# Patient Record
Sex: FEMALE | Race: WHITE | Employment: UNEMPLOYED | URBAN - METROPOLITAN AREA
[De-identification: names, ages, dates, MRNs, and addresses within clinical notes are randomized per-mention and may not be internally consistent; named-entity substitution may affect disease eponyms.]

---

## 2019-06-10 ENCOUNTER — APPOINTMENT (OUTPATIENT)
Dept: CT IMAGING | Age: 59
End: 2019-06-10

## 2019-06-10 ENCOUNTER — HOSPITAL ENCOUNTER (EMERGENCY)
Age: 59
Discharge: HOME | End: 2019-06-10

## 2019-06-10 VITALS
HEART RATE: 88 BPM | RESPIRATION RATE: 18 BRPM | DIASTOLIC BLOOD PRESSURE: 92 MMHG | TEMPERATURE: 98.5 F | WEIGHT: 240 LBS | OXYGEN SATURATION: 98 % | BODY MASS INDEX: 40.97 KG/M2 | HEIGHT: 64 IN | SYSTOLIC BLOOD PRESSURE: 142 MMHG

## 2019-06-10 DIAGNOSIS — M51.36 BULGING LUMBAR DISC: ICD-10-CM

## 2019-06-10 DIAGNOSIS — M54.31 SCIATICA OF RIGHT SIDE: Primary | ICD-10-CM

## 2019-06-10 PROCEDURE — 99283 EMERGENCY DEPT VISIT LOW MDM: CPT

## 2019-06-10 PROCEDURE — 6370000000 HC RX 637 (ALT 250 FOR IP): Performed by: NURSE PRACTITIONER

## 2019-06-10 PROCEDURE — 72131 CT LUMBAR SPINE W/O DYE: CPT

## 2019-06-10 RX ORDER — ATORVASTATIN CALCIUM 10 MG/1
10 TABLET, FILM COATED ORAL EVERY EVENING
COMMUNITY
End: 2022-01-13

## 2019-06-10 RX ORDER — AMLODIPINE BESYLATE 5 MG/1
5 TABLET ORAL DAILY
COMMUNITY
End: 2022-01-13 | Stop reason: ALTCHOICE

## 2019-06-10 RX ORDER — HYDROCODONE BITARTRATE AND ACETAMINOPHEN 5; 325 MG/1; MG/1
1 TABLET ORAL ONCE
Status: COMPLETED | OUTPATIENT
Start: 2019-06-10 | End: 2019-06-10

## 2019-06-10 RX ORDER — NAPROXEN 500 MG/1
500 TABLET ORAL 2 TIMES DAILY PRN
Qty: 28 TABLET | Refills: 0 | Status: SHIPPED | OUTPATIENT
Start: 2019-06-10 | End: 2019-07-26

## 2019-06-10 RX ORDER — BENAZEPRIL HYDROCHLORIDE 10 MG/1
10 TABLET ORAL DAILY
COMMUNITY
End: 2022-01-13 | Stop reason: ALTCHOICE

## 2019-06-10 RX ORDER — PREDNISONE 10 MG/1
TABLET ORAL
Qty: 20 TABLET | Refills: 0 | Status: SHIPPED | OUTPATIENT
Start: 2019-06-10 | End: 2019-06-20

## 2019-06-10 RX ORDER — HYDROCODONE BITARTRATE AND ACETAMINOPHEN 5; 325 MG/1; MG/1
1 TABLET ORAL EVERY 6 HOURS PRN
Qty: 12 TABLET | Refills: 0 | Status: SHIPPED | OUTPATIENT
Start: 2019-06-10 | End: 2019-06-13

## 2019-06-10 RX ORDER — DICYCLOMINE HYDROCHLORIDE 10 MG/1
10 CAPSULE ORAL 2 TIMES DAILY
COMMUNITY

## 2019-06-10 RX ORDER — METHOCARBAMOL 500 MG/1
500 TABLET, FILM COATED ORAL 4 TIMES DAILY
Qty: 20 TABLET | Refills: 0 | Status: SHIPPED | OUTPATIENT
Start: 2019-06-10 | End: 2019-06-15

## 2019-06-10 RX ADMIN — HYDROCODONE BITARTRATE AND ACETAMINOPHEN 1 TABLET: 5; 325 TABLET ORAL at 10:29

## 2019-06-10 ASSESSMENT — PAIN DESCRIPTION - LOCATION: LOCATION: HIP

## 2019-06-10 ASSESSMENT — PAIN SCALES - GENERAL
PAINLEVEL_OUTOF10: 8
PAINLEVEL_OUTOF10: 9

## 2019-06-10 ASSESSMENT — PAIN DESCRIPTION - PAIN TYPE: TYPE: CHRONIC PAIN

## 2019-06-10 ASSESSMENT — PAIN DESCRIPTION - ORIENTATION: ORIENTATION: RIGHT

## 2019-06-10 NOTE — ED PROVIDER NOTES
Independent P    HPI: Dandre Alarcon 66 y. o.female with   Past Medical History:   Diagnosis Date    Hypertension     who presents with a right sided lower back pain. The patient states that the back pain began chronic in nature however worse the past few days.   The history is obtained from the patient. The mechanism of the injury is apparent. The patient states that the pain is moderate. It is worsened by movement including flexion and extension of the back as well as rotation. Patient denies any distal neurovascular complaints including numbness tingling or weakness. There are no bowel or bladder symptoms reported. The patient denies saddle or groin anesthesia. The patient also denies fevers, chills, weight loss, night sweats, IV drug use, or recent illness. States that she has had pain to the lower back area for quite some time however is now having pain to the right lower lumbar with radiation into the right lower extremity. She denies any recent fall or injury. She denies any loss of bowel or bladder control. Denies any saddle paresthesia.        ROS:   Pertinent positives and negatives are stated within HPI, all other systems reviewed and are negative.    --------------------------------------------- PAST HISTORY ---------------------------------------------  Past Medical History:  has a past medical history of Hypertension. Past Surgical History:  has no past surgical history on file. Social History:  reports that she has never smoked. She does not have any smokeless tobacco history on file. She reports that she does not drink alcohol or use drugs. Family History: family history is not on file. The patients home medications have been reviewed. Allergies: Patient has no known allergies. ------------------------- NURSING NOTES AND VITALS REVIEWED ---------------------------   The nursing notes within the ED encounter and vital signs as below have been reviewed by myself.   BP (!) 142/92   Pulse 88   Temp 98.5 °F (36.9 °C)   Resp 18   Ht 5' 4\" (1.626 m)   Wt 240 lb (108.9 kg)   SpO2 98%   BMI 41.20 kg/m²   Oxygen Saturation Interpretation: Normal    The patients available past medical records and past encounters were reviewed. Physical exam:  Constitutional:  The patient is comfortable, well appearing, non toxic in NAD. Patient is alert and oriented x3. Head: Head is atraumatic and normocephalic. Eyes: There is no discharge from the eyes. Sclerae are normal.  ENT: The oropharynx is normal. The mouth is normal to inspection. Neck: Normal range of motion of the neck is present there is no JVD present no meningeal signs are present. Respiratory/chest: The chest is nontender breath sounds are normal  Cardiovascular: Regular rate and rhythm is noted. No murmurs. No rubs or gallops. Skin: Skin is warm and dry, Skin exam normal  Neurologic exam: The patient's Riley Coma Scale is 15. No focal motor deficits. There are no focal sensory deficits. Deep tendon reflexes are intact and present bilaterally in the lower extremities. Babinski is absent. Gait is normal. Symmetric strength and sensation in the lower extremities bilaterally. Back exam: The patient has reproducible tenderness to palpation in the paravertebral lumbar region on the right sacroiliac. There is no evidence of localized erythema nor is there any focal warmth to the back. The patient has no evidence of single vertebral tenderness or any single area of interspace tenderness. There are no palpable deformities, lacerations, abrasions, or stepoffs. No midline cervical, thoracic, or lumbar spine tenderness.           -------------------------------------------------- RESULTS -------------------------------------------------  I have personally reviewed all laboratory and imaging results for this patient. Results are listed below. LABS:  No results found for this visit on 06/10/19.     RADIOLOGY:  Interpreted by Radiologist.  CT Lumbar Spine WO Contrast   Final Result   1. No acute lumbar spine fracture. 2. Chronic appearing compression deformity of the T12 vertebral body. 3. Levoconvex curvature with multilevel lumbar spondylosis, as   described above, most pronounced at L5-S1, where there is a diffuse   disc bulge and facet hypertrophy resulting in severe bilateral   foraminal narrowing. Medical decision making:   Right sciatica and multiple level of bulging disks in the lumbar spine without evidence of fracture or neurologic compromise. Patient informed of CAT scan results advised to follow-up with PCP if no improvement symptoms may need to have an outpatient nonemergent MRI for further evaluation.     Plan:  Review of past medical records for appropriate pain control and outpatient referral.           --------------------------------- IMPRESSION AND DISPOSITION ---------------------------------    IMPRESSION  1. Sciatica of right side    2.  Bulging lumbar disc        DISPOSITION  Disposition: Discharge to home  Patient condition is good         CLARA Barrios - CNP  06/10/19 0592

## 2019-07-03 ENCOUNTER — TELEPHONE (OUTPATIENT)
Dept: NEUROSURGERY | Age: 59
End: 2019-07-03

## 2019-07-15 ENCOUNTER — HOSPITAL ENCOUNTER (EMERGENCY)
Age: 59
Discharge: HOME | End: 2019-07-15

## 2019-07-15 VITALS
SYSTOLIC BLOOD PRESSURE: 130 MMHG | TEMPERATURE: 97.5 F | WEIGHT: 240 LBS | BODY MASS INDEX: 40.97 KG/M2 | RESPIRATION RATE: 16 BRPM | OXYGEN SATURATION: 95 % | DIASTOLIC BLOOD PRESSURE: 92 MMHG | HEART RATE: 92 BPM | HEIGHT: 64 IN

## 2019-07-15 DIAGNOSIS — I10 ELEVATED BLOOD PRESSURE READING WITH DIAGNOSIS OF HYPERTENSION: ICD-10-CM

## 2019-07-15 DIAGNOSIS — M54.41 ACUTE BILATERAL LOW BACK PAIN WITH RIGHT-SIDED SCIATICA: Primary | ICD-10-CM

## 2019-07-15 PROCEDURE — 6370000000 HC RX 637 (ALT 250 FOR IP): Performed by: NURSE PRACTITIONER

## 2019-07-15 PROCEDURE — 99282 EMERGENCY DEPT VISIT SF MDM: CPT

## 2019-07-15 RX ORDER — OXYCODONE HYDROCHLORIDE AND ACETAMINOPHEN 5; 325 MG/1; MG/1
2 TABLET ORAL ONCE
Status: COMPLETED | OUTPATIENT
Start: 2019-07-15 | End: 2019-07-15

## 2019-07-15 RX ORDER — OXYCODONE HYDROCHLORIDE AND ACETAMINOPHEN 5; 325 MG/1; MG/1
1 TABLET ORAL EVERY 6 HOURS PRN
Qty: 12 TABLET | Refills: 0 | Status: SHIPPED | OUTPATIENT
Start: 2019-07-15 | End: 2019-07-18 | Stop reason: ALTCHOICE

## 2019-07-15 RX ORDER — KETOROLAC TROMETHAMINE 10 MG/1
10 TABLET, FILM COATED ORAL ONCE
Status: COMPLETED | OUTPATIENT
Start: 2019-07-15 | End: 2019-07-15

## 2019-07-15 RX ORDER — LIDOCAINE 4 G/G
1 PATCH TOPICAL ONCE
Status: DISCONTINUED | OUTPATIENT
Start: 2019-07-15 | End: 2019-07-15

## 2019-07-15 RX ADMIN — KETOROLAC TROMETHAMINE 10 MG: 10 TABLET, FILM COATED ORAL at 12:58

## 2019-07-15 RX ADMIN — OXYCODONE HYDROCHLORIDE AND ACETAMINOPHEN 2 TABLET: 5; 325 TABLET ORAL at 11:49

## 2019-07-15 ASSESSMENT — PAIN DESCRIPTION - PAIN TYPE: TYPE: ACUTE PAIN;CHRONIC PAIN

## 2019-07-15 ASSESSMENT — PAIN DESCRIPTION - PROGRESSION: CLINICAL_PROGRESSION: GRADUALLY WORSENING

## 2019-07-15 ASSESSMENT — PAIN DESCRIPTION - LOCATION: LOCATION: BACK

## 2019-07-15 ASSESSMENT — PAIN DESCRIPTION - ORIENTATION: ORIENTATION: RIGHT

## 2019-07-15 ASSESSMENT — PAIN DESCRIPTION - DESCRIPTORS: DESCRIPTORS: ACHING

## 2019-07-15 ASSESSMENT — PAIN DESCRIPTION - FREQUENCY: FREQUENCY: CONTINUOUS

## 2019-07-15 ASSESSMENT — PAIN SCALES - GENERAL
PAINLEVEL_OUTOF10: 8
PAINLEVEL_OUTOF10: 10

## 2019-07-15 NOTE — ED PROVIDER NOTES
lower extremity paresthesia, incontinence of bowel or bladder, plasma donation, hemodialysis or history of IV drug use. Denies any associated fever or chills. Denies chest pains, shortness of breath, cough with phlegm or hemoptysis. Denies abdominal pains, nausea, vomiting, change in bowel patterns, hematochezia or melena. Denies dysuria, hematuria, suprapubic or flank pains. ROS   Pertinent positives and negatives are stated within HPI, all other systems reviewed and are negative. History reviewed. No pertinent surgical history. Social History:  reports that she has never smoked. She has never used smokeless tobacco. She reports that she has current or past drug history. Drug: Marijuana. She reports that she does not drink alcohol. Family History: family history is not on file. Allergies: Patient has no known allergies. Physical Exam           ED Triage Vitals [07/15/19 1056]   BP Temp Temp Source Pulse Resp SpO2 Height Weight   (!) 122/108 97.5 °F (36.4 °C) Tympanic 110 16 95 % 5' 4\" (1.626 m) 240 lb (108.9 kg)      Oxygen Saturation Interpretation: Normal.    Constitutional:  Alert, development consistent with age. HEENT:  NC/NT. Airway patent. Neck:  Normal ROM. Supple. Respiratory:  Clear to auscultation and breath sounds equal.  CV:  Regular rate and rhythm, normal heart sounds, without pathological murmurs, ectopy, gallops, or rubs. GI:  Abdomen Soft, nontender, good bowel sounds. No firm or pulsatile mass. Back: middle and lower lumbar spine bilateral.             Tenderness: Mild paraspinous muscular tenderness and hypertonicity consistent with spasm with no midline tenderness. Swelling: no.              Range of Motion: full range with pain. CVA Tenderness: No.            Straight leg raising:  Right positive at 40 degrees. Skin:  no erythema, rash or swelling noted. Distal Function:              Motor deficit: none. Sensory deficit: none. for evaluation but otherwise PCP follow up for reevaluation. Counseling: The emergency provider has spoken with the patient and discussed todays results, in addition to providing specific details for the plan of care and counseling regarding the diagnosis and prognosis. Questions are answered at this time and they are agreeable with the plan. Assessment      1. Acute on chronic bilateral low back pain with right-sided sciatica    2. Elevated blood pressure reading with diagnosis of hypertension      Plan   Discharge to home  Patient condition is good    New Medications     New Prescriptions    OXYCODONE-ACETAMINOPHEN (PERCOCET) 5-325 MG PER TABLET    Take 1 tablet by mouth every 6 hours as needed for Pain for up to 3 days. Intended supply: 3 days. Take lowest dose possible to manage pain. Use caution as may cause drowsiness or sedation. Do not operate machinery, take while working, drive or drink alcohol while taking. Electronically signed by CLARA Alvarez CNP   DD: 7/15/19  **This report was transcribed using voice recognition software. Every effort was made to ensure accuracy; however, inadvertent computerized transcription errors may be present.   END OF ED PROVIDER NOTE        CLARA Alvarez CNP  07/15/19 7887

## 2019-07-18 ENCOUNTER — HOSPITAL ENCOUNTER (EMERGENCY)
Age: 59
Discharge: HOME | End: 2019-07-18
Attending: EMERGENCY MEDICINE

## 2019-07-18 ENCOUNTER — APPOINTMENT (OUTPATIENT)
Dept: MRI IMAGING | Age: 59
End: 2019-07-18

## 2019-07-18 VITALS
DIASTOLIC BLOOD PRESSURE: 95 MMHG | HEART RATE: 99 BPM | OXYGEN SATURATION: 97 % | WEIGHT: 240 LBS | TEMPERATURE: 97.7 F | BODY MASS INDEX: 41.2 KG/M2 | RESPIRATION RATE: 16 BRPM | SYSTOLIC BLOOD PRESSURE: 155 MMHG

## 2019-07-18 DIAGNOSIS — M51.16 LUMBAR DISC DISEASE WITH RADICULOPATHY: Primary | ICD-10-CM

## 2019-07-18 LAB
ANION GAP SERPL CALCULATED.3IONS-SCNC: 11 MMOL/L (ref 7–16)
BACTERIA: ABNORMAL /HPF
BILIRUBIN URINE: NEGATIVE
BLOOD, URINE: NEGATIVE
BUN BLDV-MCNC: 15 MG/DL (ref 6–20)
CALCIUM SERPL-MCNC: 9.9 MG/DL (ref 8.6–10.2)
CHLORIDE BLD-SCNC: 104 MMOL/L (ref 98–107)
CLARITY: CLEAR
CO2: 31 MMOL/L (ref 22–29)
COLOR: YELLOW
CREAT SERPL-MCNC: 0.6 MG/DL (ref 0.5–1)
EPITHELIAL CELLS, UA: ABNORMAL /HPF
GFR AFRICAN AMERICAN: >60
GFR AFRICAN AMERICAN: >60
GFR NON-AFRICAN AMERICAN: >60 ML/MIN/1.73
GFR NON-AFRICAN AMERICAN: >60 ML/MIN/1.73
GLUCOSE BLD-MCNC: 121 MG/DL (ref 74–99)
GLUCOSE BLD-MCNC: 135 MG/DL (ref 74–99)
GLUCOSE URINE: NEGATIVE MG/DL
KETONES, URINE: NEGATIVE MG/DL
LEUKOCYTE ESTERASE, URINE: NEGATIVE
NITRITE, URINE: NEGATIVE
PERFORMED ON: ABNORMAL
PH UA: 5.5 (ref 5–9)
POC CHLORIDE: 107 MMOL/L (ref 100–108)
POC CREATININE: 0.7 MG/DL (ref 0.5–1)
POC POTASSIUM: >9 MMOL/L (ref 3.5–5)
POC SODIUM: 134 MMOL/L (ref 132–146)
POTASSIUM SERPL-SCNC: 4.1 MMOL/L (ref 3.5–5)
PROTEIN UA: NORMAL MG/DL
RBC UA: ABNORMAL /HPF (ref 0–2)
REASON FOR REJECTION: NORMAL
REJECTED TEST: NORMAL
SODIUM BLD-SCNC: 146 MMOL/L (ref 132–146)
SPECIFIC GRAVITY UA: 1.02 (ref 1–1.03)
UROBILINOGEN, URINE: 0.2 E.U./DL
WBC UA: ABNORMAL /HPF (ref 0–5)

## 2019-07-18 PROCEDURE — 96374 THER/PROPH/DIAG INJ IV PUSH: CPT

## 2019-07-18 PROCEDURE — 82435 ASSAY OF BLOOD CHLORIDE: CPT

## 2019-07-18 PROCEDURE — 6360000002 HC RX W HCPCS: Performed by: NURSE PRACTITIONER

## 2019-07-18 PROCEDURE — 82565 ASSAY OF CREATININE: CPT

## 2019-07-18 PROCEDURE — 87088 URINE BACTERIA CULTURE: CPT

## 2019-07-18 PROCEDURE — 96375 TX/PRO/DX INJ NEW DRUG ADDON: CPT

## 2019-07-18 PROCEDURE — 80048 BASIC METABOLIC PNL TOTAL CA: CPT

## 2019-07-18 PROCEDURE — 84132 ASSAY OF SERUM POTASSIUM: CPT

## 2019-07-18 PROCEDURE — 82947 ASSAY GLUCOSE BLOOD QUANT: CPT

## 2019-07-18 PROCEDURE — 84295 ASSAY OF SERUM SODIUM: CPT

## 2019-07-18 PROCEDURE — 36415 COLL VENOUS BLD VENIPUNCTURE: CPT

## 2019-07-18 PROCEDURE — 99283 EMERGENCY DEPT VISIT LOW MDM: CPT

## 2019-07-18 PROCEDURE — 81001 URINALYSIS AUTO W/SCOPE: CPT

## 2019-07-18 PROCEDURE — 72148 MRI LUMBAR SPINE W/O DYE: CPT

## 2019-07-18 RX ORDER — MORPHINE SULFATE 4 MG/ML
4 INJECTION, SOLUTION INTRAMUSCULAR; INTRAVENOUS ONCE
Status: COMPLETED | OUTPATIENT
Start: 2019-07-18 | End: 2019-07-18

## 2019-07-18 RX ORDER — OXYCODONE HYDROCHLORIDE AND ACETAMINOPHEN 5; 325 MG/1; MG/1
1 TABLET ORAL EVERY 8 HOURS PRN
Qty: 9 TABLET | Refills: 0 | Status: SHIPPED | OUTPATIENT
Start: 2019-07-18 | End: 2019-07-21

## 2019-07-18 RX ORDER — KETOROLAC TROMETHAMINE 30 MG/ML
15 INJECTION, SOLUTION INTRAMUSCULAR; INTRAVENOUS ONCE
Status: COMPLETED | OUTPATIENT
Start: 2019-07-18 | End: 2019-07-18

## 2019-07-18 RX ADMIN — MORPHINE SULFATE 4 MG: 4 INJECTION, SOLUTION INTRAMUSCULAR; INTRAVENOUS at 16:20

## 2019-07-18 RX ADMIN — KETOROLAC TROMETHAMINE 15 MG: 30 INJECTION, SOLUTION INTRAMUSCULAR; INTRAVENOUS at 12:19

## 2019-07-18 ASSESSMENT — PAIN DESCRIPTION - LOCATION: LOCATION: LEG

## 2019-07-18 ASSESSMENT — PAIN DESCRIPTION - PAIN TYPE: TYPE: ACUTE PAIN;CHRONIC PAIN

## 2019-07-18 ASSESSMENT — PAIN SCALES - GENERAL
PAINLEVEL_OUTOF10: 7
PAINLEVEL_OUTOF10: 10

## 2019-07-18 ASSESSMENT — PAIN DESCRIPTION - DIRECTION: RADIATING_TOWARDS: RADIATES DOWN THE RIGHT LEG

## 2019-07-18 ASSESSMENT — PAIN DESCRIPTION - ORIENTATION: ORIENTATION: RIGHT

## 2019-07-18 NOTE — ED PROVIDER NOTES
was transcribed using voice recognition software. Every effort was made to ensure accuracy; however, inadvertent computerized transcription errors may be present.   Hoarcio OF ED PROVIDER NOTE        Elier Day, CLARA - CNP  07/18/19 3923

## 2019-07-19 LAB — URINE CULTURE, ROUTINE: NORMAL

## 2019-07-25 ENCOUNTER — INITIAL CONSULT (OUTPATIENT)
Dept: NEUROSURGERY | Age: 59
End: 2019-07-25

## 2019-07-25 DIAGNOSIS — M51.36 LUMBAR DEGENERATIVE DISC DISEASE: Primary | ICD-10-CM

## 2019-07-25 DIAGNOSIS — M54.16 LUMBAR RADICULOPATHY: Primary | ICD-10-CM

## 2019-07-25 PROCEDURE — 99203 OFFICE O/P NEW LOW 30 MIN: CPT | Performed by: PHYSICIAN ASSISTANT

## 2019-07-25 ASSESSMENT — ENCOUNTER SYMPTOMS
RESPIRATORY NEGATIVE: 1
EYES NEGATIVE: 1
ALLERGIC/IMMUNOLOGIC NEGATIVE: 1
GASTROINTESTINAL NEGATIVE: 1
BACK PAIN: 1

## 2019-07-25 NOTE — PROGRESS NOTES
her chronic low back pain. She is having 3 month history of severe right leg pain into the top of the foot. Lumbar MRI reveals L4-5 grade one spondylolithesis. L4-5 and L5-S1DDD and disc herniations causing severe bilateral foraminal stenosis and moderate L4-5 central stenosis. Severe L3-S1 facet arthropathy. Plan:      PT, NSAIDS, gabapentin and LIN have not provided any relief. She wants to try one more LIN. Lumbar flexion/ext x-rays will be ordered. She will return to schedule a lumbar fusion and decompression with Dr. Ricardo Otoole.         EDIS Deluna

## 2019-07-26 ENCOUNTER — OFFICE VISIT (OUTPATIENT)
Dept: PAIN MANAGEMENT | Age: 59
End: 2019-07-26

## 2019-07-26 ENCOUNTER — HOSPITAL ENCOUNTER (OUTPATIENT)
Dept: GENERAL RADIOLOGY | Age: 59
Discharge: HOME | End: 2019-07-28

## 2019-07-26 ENCOUNTER — PREP FOR PROCEDURE (OUTPATIENT)
Dept: PAIN MANAGEMENT | Age: 59
End: 2019-07-26

## 2019-07-26 ENCOUNTER — HOSPITAL ENCOUNTER (OUTPATIENT)
Age: 59
Discharge: HOME | End: 2019-07-28

## 2019-07-26 VITALS
OXYGEN SATURATION: 97 % | RESPIRATION RATE: 18 BRPM | WEIGHT: 230 LBS | TEMPERATURE: 98.2 F | HEART RATE: 120 BPM | BODY MASS INDEX: 39.27 KG/M2 | SYSTOLIC BLOOD PRESSURE: 130 MMHG | DIASTOLIC BLOOD PRESSURE: 84 MMHG | HEIGHT: 64 IN

## 2019-07-26 DIAGNOSIS — M51.9 LUMBAR DISC DISORDER: ICD-10-CM

## 2019-07-26 DIAGNOSIS — M51.36 LUMBAR DEGENERATIVE DISC DISEASE: ICD-10-CM

## 2019-07-26 DIAGNOSIS — G89.4 CHRONIC PAIN SYNDROME: ICD-10-CM

## 2019-07-26 DIAGNOSIS — M54.16 LUMBAR RADICULOPATHY: Primary | ICD-10-CM

## 2019-07-26 DIAGNOSIS — M47.816 LUMBAR FACET ARTHROPATHY: ICD-10-CM

## 2019-07-26 DIAGNOSIS — M54.16 LUMBAR RADICULOPATHY: ICD-10-CM

## 2019-07-26 DIAGNOSIS — M48.062 SPINAL STENOSIS OF LUMBAR REGION WITH NEUROGENIC CLAUDICATION: Primary | ICD-10-CM

## 2019-07-26 DIAGNOSIS — M47.816 LUMBAR SPONDYLOSIS: ICD-10-CM

## 2019-07-26 PROCEDURE — 72120 X-RAY BEND ONLY L-S SPINE: CPT

## 2019-07-26 PROCEDURE — 99204 OFFICE O/P NEW MOD 45 MIN: CPT | Performed by: PAIN MEDICINE

## 2019-07-26 PROCEDURE — 73521 X-RAY EXAM HIPS BI 2 VIEWS: CPT

## 2019-07-26 RX ORDER — CELECOXIB 200 MG/1
CAPSULE ORAL
Refills: 1 | Status: ON HOLD | COMMUNITY
Start: 2019-07-15 | End: 2019-10-06 | Stop reason: HOSPADM

## 2019-07-26 RX ORDER — LEVOTHYROXINE SODIUM 0.03 MG/1
TABLET ORAL
Refills: 1 | COMMUNITY
Start: 2019-07-09 | End: 2022-02-08 | Stop reason: ALTCHOICE

## 2019-07-26 NOTE — PROGRESS NOTES
[address]        [address] [facility], [address] [facility]      [phone]          Consult Note      Patient:  SHANIQUA Astorga 1960    Date of Service:  19    Requesting Physician:  EDIS Black    Reason for Consult:      Patient presents with complaints of low back and right leg pain that 3 month ago and has been progressively getting worse    HISTORY OF PRESENT ILLNESS:      Pain does radiate to right lower extremity down to the top of her foot. She  has numbness, tingling of the right thigh and does not have bladder or bowel dysfunction. Imaging:  Lumbar spine MRI 2019  ALERT:  THIS IS AN ABNORMAL REPORT   1. Multilevel degenerative disc disease and facet osteoarthropathy   T10-S1, worse at the L4-L5 and the L5-S1 motion segment levels. Moderately severe thecal sac stenosis at L4-L5 with suspected   underlying crowding/compression of cauda equina nerve roots, with   abutment/impingement exiting right L4 spinal nerve root at L4-L5   motion segment level and abutment/impingement of exiting bilateral L5   spinal nerve roots at L5-S1 motion segment level. See above for level   by level details.         Previous treatments: Physical Therapy, Epidural Steroid Injection and medications. .      Past Medical History:   Diagnosis Date    Arthritis     Depression     DM (diabetes mellitus) ([facility] [address]     Hypertension     IBS (irritable bowel syndrome)     Sleep apnea      Past Surgical History:   Procedure Laterality Date    COLONOSCOPY      UPPER GASTROINTESTINAL ENDOSCOPY       Prior to Admission medications    Medication Sig Start Date End Date Taking?  Authorizing Provider   celecoxib (CELEBREX) 200 MG capsule TAKE 1 CAPSULE BY MOUTH TWICE DAILY AS NEEDED 7/15/19  Yes Historical Provider, MD   levothyroxine (SYNTHROID) 25 MCG tablet TAKE ONE TABLET BY MOUTH EVERY DAY 19  Yes Historical Provider, MD   vitamin D (CHOLECALCIFEROL)

## 2019-07-31 ENCOUNTER — TELEPHONE (OUTPATIENT)
Dept: NEUROSURGERY | Age: 59
End: 2019-07-31

## 2019-08-06 ENCOUNTER — OFFICE VISIT (OUTPATIENT)
Dept: NEUROSURGERY | Age: 59
End: 2019-08-06

## 2019-08-06 VITALS
WEIGHT: 230 LBS | BODY MASS INDEX: 39.27 KG/M2 | HEIGHT: 64 IN | DIASTOLIC BLOOD PRESSURE: 65 MMHG | SYSTOLIC BLOOD PRESSURE: 113 MMHG

## 2019-08-06 DIAGNOSIS — M54.41 CHRONIC MIDLINE LOW BACK PAIN WITH BILATERAL SCIATICA: Primary | ICD-10-CM

## 2019-08-06 DIAGNOSIS — M54.42 CHRONIC MIDLINE LOW BACK PAIN WITH BILATERAL SCIATICA: Primary | ICD-10-CM

## 2019-08-06 DIAGNOSIS — G89.29 CHRONIC MIDLINE LOW BACK PAIN WITH BILATERAL SCIATICA: Primary | ICD-10-CM

## 2019-08-06 PROCEDURE — 99213 OFFICE O/P EST LOW 20 MIN: CPT | Performed by: NEUROLOGICAL SURGERY

## 2019-08-06 NOTE — PROGRESS NOTES
Patient is here for follow up consult for: back and leg pain. Physical exam  Alert and Oriented X3  PERRLA, EOMI  CAAL 5/5  Sensation intact to LT and PP  Reflexes are 2+ and symmetric    A/P: patient is here for follow up for: back and leg pain. Her MRI shows an L4-L5 spondylolisthesis and stenosis from L4-S1. She has failed epidurals and PT and I am recomending an L4-L5 and L5-S1 posterior lumbar interbody fusion.       Ernst Walters

## 2019-08-08 DIAGNOSIS — M48.061 SPINAL STENOSIS OF LUMBAR REGION WITHOUT NEUROGENIC CLAUDICATION: ICD-10-CM

## 2019-08-08 DIAGNOSIS — M43.16 SPONDYLOLISTHESIS AT L4-L5 LEVEL: Primary | ICD-10-CM

## 2019-08-13 ENCOUNTER — PREP FOR PROCEDURE (OUTPATIENT)
Dept: NEUROSURGERY | Age: 59
End: 2019-08-13

## 2019-08-13 DIAGNOSIS — M48.061 SPINAL STENOSIS OF LUMBAR REGION WITHOUT NEUROGENIC CLAUDICATION: Primary | ICD-10-CM

## 2019-08-13 RX ORDER — SODIUM CHLORIDE 0.9 % (FLUSH) 0.9 %
10 SYRINGE (ML) INJECTION EVERY 12 HOURS SCHEDULED
Status: CANCELLED | OUTPATIENT
Start: 2019-08-13

## 2019-08-13 RX ORDER — SODIUM CHLORIDE 9 MG/ML
INJECTION, SOLUTION INTRAVENOUS CONTINUOUS
Status: CANCELLED | OUTPATIENT
Start: 2019-08-13

## 2019-08-13 RX ORDER — SODIUM CHLORIDE 0.9 % (FLUSH) 0.9 %
10 SYRINGE (ML) INJECTION PRN
Status: CANCELLED | OUTPATIENT
Start: 2019-08-13

## 2019-08-19 RX ORDER — GABAPENTIN 300 MG/1
600 CAPSULE ORAL 3 TIMES DAILY
COMMUNITY
End: 2020-02-13

## 2019-08-22 ENCOUNTER — HOSPITAL ENCOUNTER (OUTPATIENT)
Age: 59
Setting detail: OUTPATIENT SURGERY
Discharge: HOME | End: 2019-08-22
Attending: PAIN MEDICINE | Admitting: PAIN MEDICINE

## 2019-08-22 ENCOUNTER — HOSPITAL ENCOUNTER (OUTPATIENT)
Dept: OPERATING ROOM | Age: 59
Setting detail: OUTPATIENT SURGERY
Discharge: HOME | End: 2019-08-22
Attending: PAIN MEDICINE

## 2019-08-22 VITALS
DIASTOLIC BLOOD PRESSURE: 76 MMHG | TEMPERATURE: 98 F | RESPIRATION RATE: 16 BRPM | OXYGEN SATURATION: 95 % | HEART RATE: 89 BPM | SYSTOLIC BLOOD PRESSURE: 116 MMHG

## 2019-08-22 DIAGNOSIS — M54.16 LUMBAR RADICULOPATHY: ICD-10-CM

## 2019-08-22 PROCEDURE — 3600000005 HC SURGERY LEVEL 5 BASE: Performed by: PAIN MEDICINE

## 2019-08-22 PROCEDURE — 6360000004 HC RX CONTRAST MEDICATION: Performed by: PAIN MEDICINE

## 2019-08-22 PROCEDURE — 7100000010 HC PHASE II RECOVERY - FIRST 15 MIN: Performed by: PAIN MEDICINE

## 2019-08-22 PROCEDURE — 62323 NJX INTERLAMINAR LMBR/SAC: CPT | Performed by: PAIN MEDICINE

## 2019-08-22 PROCEDURE — 3209999900 FLUORO FOR SURGICAL PROCEDURES

## 2019-08-22 PROCEDURE — 2500000003 HC RX 250 WO HCPCS: Performed by: PAIN MEDICINE

## 2019-08-22 PROCEDURE — 2709999900 HC NON-CHARGEABLE SUPPLY: Performed by: PAIN MEDICINE

## 2019-08-22 PROCEDURE — 6360000002 HC RX W HCPCS: Performed by: PAIN MEDICINE

## 2019-08-22 PROCEDURE — 7100000011 HC PHASE II RECOVERY - ADDTL 15 MIN: Performed by: PAIN MEDICINE

## 2019-08-22 RX ORDER — LIDOCAINE HYDROCHLORIDE 5 MG/ML
INJECTION, SOLUTION INFILTRATION; INTRAVENOUS PRN
Status: DISCONTINUED | OUTPATIENT
Start: 2019-08-22 | End: 2019-08-22 | Stop reason: ALTCHOICE

## 2019-08-22 ASSESSMENT — PAIN DESCRIPTION - FREQUENCY: FREQUENCY: INTERMITTENT

## 2019-08-22 ASSESSMENT — PAIN DESCRIPTION - LOCATION
LOCATION: BACK
LOCATION: BACK;LEG

## 2019-08-22 ASSESSMENT — PAIN DESCRIPTION - PAIN TYPE: TYPE: SURGICAL PAIN

## 2019-08-22 ASSESSMENT — PAIN DESCRIPTION - DESCRIPTORS
DESCRIPTORS: ACHING
DESCRIPTORS: ACHING;DISCOMFORT

## 2019-08-22 ASSESSMENT — PAIN SCALES - GENERAL
PAINLEVEL_OUTOF10: 2
PAINLEVEL_OUTOF10: 0

## 2019-08-22 ASSESSMENT — PAIN - FUNCTIONAL ASSESSMENT: PAIN_FUNCTIONAL_ASSESSMENT: 0-10

## 2019-08-22 ASSESSMENT — PAIN DESCRIPTION - ORIENTATION: ORIENTATION: RIGHT

## 2019-08-22 NOTE — H&P
Historical Provider, MD   Ascorbic Acid (VITAMIN C) 500 MG tablet Take 500 mg by mouth daily.    Yes Historical Provider, MD       No Known Allergies    Social History     Socioeconomic History    Marital status:      Spouse name: Not on file    Number of children: Not on file    Years of education: Not on file    Highest education level: Not on file   Occupational History    Not on file   Social Needs    Financial resource strain: Not on file    Food insecurity:     Worry: Not on file     Inability: Not on file    Transportation needs:     Medical: Not on file     Non-medical: Not on file   Tobacco Use    Smoking status: Never Smoker    Smokeless tobacco: Never Used   Substance and Sexual Activity    Alcohol use: No    Drug use: Yes     Types: Marijuana     Comment: states thats her only pain med right now    Sexual activity: Not on file   Lifestyle    Physical activity:     Days per week: Not on file     Minutes per session: Not on file    Stress: Not on file   Relationships    Social connections:     Talks on phone: Not on file     Gets together: Not on file     Attends Druze service: Not on file     Active member of club or organization: Not on file     Attends meetings of clubs or organizations: Not on file     Relationship status: Not on file    Intimate partner violence:     Fear of current or ex partner: Not on file     Emotionally abused: Not on file     Physically abused: Not on file     Forced sexual activity: Not on file   Other Topics Concern    Not on file   Social History Narrative    Not on file       Family History   Problem Relation Age of Onset    Arthritis Other     Cancer Other     Depression Other     Diabetes Other     Heart Disease Other     Hypertension Other          REVIEW OF SYSTEMS:    CONSTITUTIONAL:  negative for  fevers, chills, sweats and fatigue    RESPIRATORY:  negative for  dry cough, cough with sputum, dyspnea, wheezing and chest

## 2019-08-30 ENCOUNTER — OFFICE VISIT (OUTPATIENT)
Dept: PAIN MANAGEMENT | Age: 59
End: 2019-08-30

## 2019-08-30 VITALS
BODY MASS INDEX: 39.27 KG/M2 | WEIGHT: 230 LBS | DIASTOLIC BLOOD PRESSURE: 82 MMHG | TEMPERATURE: 98.2 F | SYSTOLIC BLOOD PRESSURE: 118 MMHG | RESPIRATION RATE: 16 BRPM | HEIGHT: 64 IN | OXYGEN SATURATION: 96 % | HEART RATE: 85 BPM

## 2019-08-30 DIAGNOSIS — M47.816 LUMBAR FACET ARTHROPATHY: Primary | ICD-10-CM

## 2019-08-30 DIAGNOSIS — M47.816 LUMBAR SPONDYLOSIS: ICD-10-CM

## 2019-08-30 DIAGNOSIS — G89.4 CHRONIC PAIN SYNDROME: ICD-10-CM

## 2019-08-30 DIAGNOSIS — M48.062 SPINAL STENOSIS OF LUMBAR REGION WITH NEUROGENIC CLAUDICATION: ICD-10-CM

## 2019-08-30 DIAGNOSIS — M54.16 LUMBAR RADICULOPATHY: ICD-10-CM

## 2019-08-30 DIAGNOSIS — M51.9 LUMBAR DISC DISORDER: ICD-10-CM

## 2019-08-30 PROCEDURE — 99213 OFFICE O/P EST LOW 20 MIN: CPT | Performed by: PAIN MEDICINE

## 2019-08-30 NOTE — PROGRESS NOTES
Comment: states thats her only pain med right now    Sexual activity: Not on file   Lifestyle    Physical activity:     Days per week: Not on file     Minutes per session: Not on file    Stress: Not on file   Relationships    Social connections:     Talks on phone: Not on file     Gets together: Not on file     Attends Mandaen service: Not on file     Active member of club or organization: Not on file     Attends meetings of clubs or organizations: Not on file     Relationship status: Not on file    Intimate partner violence:     Fear of current or ex partner: Not on file     Emotionally abused: Not on file     Physically abused: Not on file     Forced sexual activity: Not on file   Other Topics Concern    Not on file   Social History Narrative    Not on file       Family History   Problem Relation Age of Onset    Arthritis Other     Cancer Other     Depression Other     Diabetes Other     Heart Disease Other     Hypertension Other      REVIEW OF SYSTEMS:     Jitendra Rubalcava denies fever/chills, chest pain, shortness of breath, new bowel or bladder complaints. All other review of systems was negative. PHYSICAL EXAMINATION:      /82 (Site: Right Upper Arm, Position: Sitting, Cuff Size: Medium Adult)   Pulse 85   Temp 98.2 °F (36.8 °C) (Oral)   Resp 16   Ht 5' 4\" (1.626 m)   Wt 230 lb (104.3 kg)   SpO2 96%   BMI 39.48 kg/m²     General:       General appearance:   pleasant and well-hydrated. , in mild discomfort and A & O x3  Build:Overweight     HEENT:     Head:normocephalic and atraumatic  Pupils:regular, round and equal.  Sclera: icterus absent,      Lungs:     Breathing:Breathing Pattern: regular, no distress     Abdomen:     Shape:non-distended and normal  Tenderness:none     Lumbar spine:     Spine inspection:scoliosis   CVA tenderness:No   Palpation:tenderness paravertebral muscles, facet loading, left, right, positive and tenderness.   Range of motion:abnormal moderately Lateral bending, flexion, extension rotation bilateral and is  painful.     Musculoskeletal:     Trigger points in Paraveteral:absent bilaterally   SI joint tenderness:negative right, negative left              GIOVANA test:negative right, negative             left  Piriformis tenderness:negative right, negative left  Trochanteric bursa tenderness:negative right, negative left  SLR:negative right, negative left, sitting      Extremities:     Tremors:None bilaterally upper and lower  Range of motion:pain with internal rotation of hips negative but limited right hip  Intact:Yes  Edema:Normal     Neurological:     Sensory:normal to light touch bilateral lower extremities  Motor:                       Right Quadriceps5/5          Left Quadriceps5/5           Right Gastrocnemius5/5    Left Gastrocnemius5/5  Right Ant Tibialis5/5  Left Ant Tibialis5/5  Reflexes:    Right Quadriceps reflex2+  Left Quadriceps reflex2+  Right Achilles reflex2+  Left Achilles reflex2+  Gait:antalgic     Dermatology:     Skin:no unusual rashes and no skin lesions     Impression:  Low back pain with radiation to the right thigh along the Right L4 dermatome  Lumbar spine MRI moderately large circumferential disc bulge with foraminal narrowing L4-5 and L5-S1   Patient had seen neurosurgery and surgery was recommended.  Patient had requested to try LESI one more time before surgery  Plan:  Follow up on her chronic low back pain (Degenerative spinal stenosis with lumbar radiculopathy and facet arthropathy)  Patient is LESI L3-4 with good outcome, patient is scheduled for surgery in 10/2018 L4-S1  Posterior interbody fusion  Continue with OTC pain medications   Continue with Gabapentin 600 mg TID  [facility] report reviewed  Patient encouraged to stay active and to lose weight  Treatment plan discussed with the patient including medications side effects     We discussed with the patient that combining opioids, benzodiazepines, alcohol, illicit drugs or sleep aids increases

## 2019-09-26 ENCOUNTER — ANESTHESIA EVENT (OUTPATIENT)
Dept: OPERATING ROOM | Age: 59
DRG: 455 | End: 2019-09-26

## 2019-09-26 ENCOUNTER — HOSPITAL ENCOUNTER (OUTPATIENT)
Dept: PREADMISSION TESTING | Age: 59
Discharge: HOME | End: 2019-09-26

## 2019-09-26 ENCOUNTER — HOSPITAL ENCOUNTER (OUTPATIENT)
Dept: GENERAL RADIOLOGY | Age: 59
Discharge: HOME | End: 2019-09-28

## 2019-09-26 VITALS
OXYGEN SATURATION: 95 % | TEMPERATURE: 97.8 F | DIASTOLIC BLOOD PRESSURE: 63 MMHG | RESPIRATION RATE: 20 BRPM | HEART RATE: 93 BPM | SYSTOLIC BLOOD PRESSURE: 116 MMHG

## 2019-09-26 DIAGNOSIS — Z01.812 PRE-OPERATIVE LABORATORY EXAMINATION: ICD-10-CM

## 2019-09-26 DIAGNOSIS — M48.061 SPINAL STENOSIS OF LUMBAR REGION WITHOUT NEUROGENIC CLAUDICATION: ICD-10-CM

## 2019-09-26 LAB
ABO/RH: NORMAL
ANION GAP SERPL CALCULATED.3IONS-SCNC: 16 MMOL/L (ref 7–16)
ANTIBODY SCREEN: NORMAL
BASOPHILS ABSOLUTE: 0.07 E9/L (ref 0–0.2)
BASOPHILS RELATIVE PERCENT: 0.9 % (ref 0–2)
BILIRUBIN URINE: NEGATIVE
BLOOD, URINE: NEGATIVE
BUN BLDV-MCNC: 14 MG/DL (ref 6–20)
CALCIUM SERPL-MCNC: 9.9 MG/DL (ref 8.6–10.2)
CHLORIDE BLD-SCNC: 102 MMOL/L (ref 98–107)
CLARITY: CLEAR
CO2: 25 MMOL/L (ref 22–29)
COLOR: YELLOW
CREAT SERPL-MCNC: 0.7 MG/DL (ref 0.5–1)
EKG ATRIAL RATE: 79 BPM
EKG P AXIS: 53 DEGREES
EKG P-R INTERVAL: 114 MS
EKG Q-T INTERVAL: 380 MS
EKG QRS DURATION: 82 MS
EKG QTC CALCULATION (BAZETT): 435 MS
EKG R AXIS: 55 DEGREES
EKG T AXIS: 59 DEGREES
EKG VENTRICULAR RATE: 79 BPM
EOSINOPHILS ABSOLUTE: 0.32 E9/L (ref 0.05–0.5)
EOSINOPHILS RELATIVE PERCENT: 4.1 % (ref 0–6)
GFR AFRICAN AMERICAN: >60
GFR NON-AFRICAN AMERICAN: >60 ML/MIN/1.73
GLUCOSE BLD-MCNC: 111 MG/DL (ref 74–99)
GLUCOSE URINE: NEGATIVE MG/DL
HCT VFR BLD CALC: 42.3 % (ref 34–48)
HEMOGLOBIN: 13.1 G/DL (ref 11.5–15.5)
IMMATURE GRANULOCYTES #: 0.03 E9/L
IMMATURE GRANULOCYTES %: 0.4 % (ref 0–5)
INR BLD: 1
KETONES, URINE: NEGATIVE MG/DL
LEUKOCYTE ESTERASE, URINE: NEGATIVE
LYMPHOCYTES ABSOLUTE: 2.29 E9/L (ref 1.5–4)
LYMPHOCYTES RELATIVE PERCENT: 29.4 % (ref 20–42)
MCH RBC QN AUTO: 29.2 PG (ref 26–35)
MCHC RBC AUTO-ENTMCNC: 31 % (ref 32–34.5)
MCV RBC AUTO: 94.4 FL (ref 80–99.9)
MONOCYTES ABSOLUTE: 0.7 E9/L (ref 0.1–0.95)
MONOCYTES RELATIVE PERCENT: 9 % (ref 2–12)
NEUTROPHILS ABSOLUTE: 4.39 E9/L (ref 1.8–7.3)
NEUTROPHILS RELATIVE PERCENT: 56.2 % (ref 43–80)
NITRITE, URINE: NEGATIVE
PDW BLD-RTO: 13.2 FL (ref 11.5–15)
PH UA: 5.5 (ref 5–9)
PLATELET # BLD: 314 E9/L (ref 130–450)
PMV BLD AUTO: 10.4 FL (ref 7–12)
POTASSIUM REFLEX MAGNESIUM: 4.1 MMOL/L (ref 3.5–5)
PROTEIN UA: NEGATIVE MG/DL
PROTHROMBIN TIME: 11.8 SEC (ref 9.3–12.4)
RBC # BLD: 4.48 E12/L (ref 3.5–5.5)
SODIUM BLD-SCNC: 143 MMOL/L (ref 132–146)
SPECIFIC GRAVITY UA: 1.02 (ref 1–1.03)
UROBILINOGEN, URINE: 0.2 E.U./DL
WBC # BLD: 7.8 E9/L (ref 4.5–11.5)

## 2019-09-26 PROCEDURE — 36415 COLL VENOUS BLD VENIPUNCTURE: CPT

## 2019-09-26 PROCEDURE — 85610 PROTHROMBIN TIME: CPT

## 2019-09-26 PROCEDURE — 80048 BASIC METABOLIC PNL TOTAL CA: CPT

## 2019-09-26 PROCEDURE — 87081 CULTURE SCREEN ONLY: CPT

## 2019-09-26 PROCEDURE — 87088 URINE BACTERIA CULTURE: CPT

## 2019-09-26 PROCEDURE — 86850 RBC ANTIBODY SCREEN: CPT

## 2019-09-26 PROCEDURE — 86900 BLOOD TYPING SEROLOGIC ABO: CPT

## 2019-09-26 PROCEDURE — 86901 BLOOD TYPING SEROLOGIC RH(D): CPT

## 2019-09-26 PROCEDURE — 85025 COMPLETE CBC W/AUTO DIFF WBC: CPT

## 2019-09-26 PROCEDURE — 71046 X-RAY EXAM CHEST 2 VIEWS: CPT

## 2019-09-26 PROCEDURE — 93005 ELECTROCARDIOGRAM TRACING: CPT

## 2019-09-26 PROCEDURE — 81003 URINALYSIS AUTO W/O SCOPE: CPT

## 2019-09-26 ASSESSMENT — PAIN DESCRIPTION - PAIN TYPE: TYPE: CHRONIC PAIN

## 2019-09-26 ASSESSMENT — PAIN SCALES - GENERAL: PAINLEVEL_OUTOF10: 7

## 2019-09-26 ASSESSMENT — PAIN DESCRIPTION - LOCATION: LOCATION: BACK

## 2019-09-26 ASSESSMENT — PAIN DESCRIPTION - PROGRESSION: CLINICAL_PROGRESSION: GRADUALLY WORSENING

## 2019-09-26 ASSESSMENT — LIFESTYLE VARIABLES: SMOKING_STATUS: 1

## 2019-09-26 ASSESSMENT — PAIN DESCRIPTION - DESCRIPTORS: DESCRIPTORS: BURNING;SHOOTING;CONSTANT

## 2019-09-26 ASSESSMENT — PAIN - FUNCTIONAL ASSESSMENT: PAIN_FUNCTIONAL_ASSESSMENT: PREVENTS OR INTERFERES WITH MANY ACTIVE NOT PASSIVE ACTIVITIES

## 2019-09-26 ASSESSMENT — PAIN DESCRIPTION - FREQUENCY: FREQUENCY: INTERMITTENT

## 2019-09-26 ASSESSMENT — PAIN DESCRIPTION - ORIENTATION: ORIENTATION: LOWER

## 2019-09-27 LAB
MRSA CULTURE ONLY: NORMAL
URINE CULTURE, ROUTINE: NORMAL

## 2019-10-03 ENCOUNTER — APPOINTMENT (OUTPATIENT)
Dept: GENERAL RADIOLOGY | Age: 59
DRG: 455 | End: 2019-10-03
Attending: NEUROLOGICAL SURGERY

## 2019-10-03 ENCOUNTER — HOSPITAL ENCOUNTER (INPATIENT)
Age: 59
LOS: 3 days | Discharge: HOME | DRG: 455 | End: 2019-10-06
Attending: NEUROLOGICAL SURGERY | Admitting: NEUROLOGICAL SURGERY

## 2019-10-03 ENCOUNTER — ANESTHESIA (OUTPATIENT)
Dept: OPERATING ROOM | Age: 59
DRG: 455 | End: 2019-10-03

## 2019-10-03 VITALS — DIASTOLIC BLOOD PRESSURE: 87 MMHG | TEMPERATURE: 97.2 F | SYSTOLIC BLOOD PRESSURE: 127 MMHG | OXYGEN SATURATION: 79 %

## 2019-10-03 DIAGNOSIS — M48.062 SPINAL STENOSIS OF LUMBAR REGION WITH NEUROGENIC CLAUDICATION: ICD-10-CM

## 2019-10-03 DIAGNOSIS — M54.16 LUMBAR RADICULOPATHY: ICD-10-CM

## 2019-10-03 DIAGNOSIS — Z01.812 PRE-OPERATIVE LABORATORY EXAMINATION: Primary | ICD-10-CM

## 2019-10-03 PROBLEM — E66.9 OBESITY (BMI 30-39.9): Status: ACTIVE | Noted: 2019-10-03

## 2019-10-03 PROBLEM — M48.061 LUMBAR STENOSIS WITHOUT NEUROGENIC CLAUDICATION: Status: ACTIVE | Noted: 2019-10-03

## 2019-10-03 PROBLEM — E11.9 TYPE 2 DIABETES MELLITUS (HCC): Status: ACTIVE | Noted: 2019-10-03

## 2019-10-03 PROBLEM — I10 HTN (HYPERTENSION): Status: ACTIVE | Noted: 2019-10-03

## 2019-10-03 LAB
CHP ED QC CHECK: NORMAL
GLUCOSE BLD-MCNC: 113 MG/DL
METER GLUCOSE: 113 MG/DL (ref 74–99)

## 2019-10-03 PROCEDURE — 1200000000 HC SEMI PRIVATE

## 2019-10-03 PROCEDURE — 2580000003 HC RX 258: Performed by: PHYSICIAN ASSISTANT

## 2019-10-03 PROCEDURE — 22842 INSERT SPINE FIXATION DEVICE: CPT | Performed by: NEUROLOGICAL SURGERY

## 2019-10-03 PROCEDURE — 6360000002 HC RX W HCPCS: Performed by: NEUROLOGICAL SURGERY

## 2019-10-03 PROCEDURE — 0SB20ZZ EXCISION OF LUMBAR VERTEBRAL DISC, OPEN APPROACH: ICD-10-PCS | Performed by: NEUROLOGICAL SURGERY

## 2019-10-03 PROCEDURE — 22634 ARTHRD CMBN 1NTRSPC EA ADDL: CPT | Performed by: NEUROLOGICAL SURGERY

## 2019-10-03 PROCEDURE — 22633 ARTHRD CMBN 1NTRSPC LUMBAR: CPT | Performed by: PHYSICIAN ASSISTANT

## 2019-10-03 PROCEDURE — 2709999900 HC NON-CHARGEABLE SUPPLY: Performed by: NEUROLOGICAL SURGERY

## 2019-10-03 PROCEDURE — 2500000003 HC RX 250 WO HCPCS: Performed by: NEUROLOGICAL SURGERY

## 2019-10-03 PROCEDURE — 2720000010 HC SURG SUPPLY STERILE: Performed by: NEUROLOGICAL SURGERY

## 2019-10-03 PROCEDURE — 0SG0071 FUSION OF LUMBAR VERTEBRAL JOINT WITH AUTOLOGOUS TISSUE SUBSTITUTE, POSTERIOR APPROACH, POSTERIOR COLUMN, OPEN APPROACH: ICD-10-PCS | Performed by: NEUROLOGICAL SURGERY

## 2019-10-03 PROCEDURE — 2580000003 HC RX 258: Performed by: NEUROLOGICAL SURGERY

## 2019-10-03 PROCEDURE — 7100000000 HC PACU RECOVERY - FIRST 15 MIN: Performed by: NEUROLOGICAL SURGERY

## 2019-10-03 PROCEDURE — 0SB40ZZ EXCISION OF LUMBOSACRAL DISC, OPEN APPROACH: ICD-10-PCS | Performed by: NEUROLOGICAL SURGERY

## 2019-10-03 PROCEDURE — 2700000000 HC OXYGEN THERAPY PER DAY

## 2019-10-03 PROCEDURE — 6370000000 HC RX 637 (ALT 250 FOR IP): Performed by: PHYSICIAN ASSISTANT

## 2019-10-03 PROCEDURE — 88304 TISSUE EXAM BY PATHOLOGIST: CPT

## 2019-10-03 PROCEDURE — 2580000003 HC RX 258

## 2019-10-03 PROCEDURE — 7100000001 HC PACU RECOVERY - ADDTL 15 MIN: Performed by: NEUROLOGICAL SURGERY

## 2019-10-03 PROCEDURE — 6360000002 HC RX W HCPCS: Performed by: ANESTHESIOLOGY

## 2019-10-03 PROCEDURE — 6360000002 HC RX W HCPCS: Performed by: PHYSICIAN ASSISTANT

## 2019-10-03 PROCEDURE — 22853 INSJ BIOMECHANICAL DEVICE: CPT | Performed by: PHYSICIAN ASSISTANT

## 2019-10-03 PROCEDURE — 22634 ARTHRD CMBN 1NTRSPC EA ADDL: CPT | Performed by: PHYSICIAN ASSISTANT

## 2019-10-03 PROCEDURE — 3600000005 HC SURGERY LEVEL 5 BASE: Performed by: NEUROLOGICAL SURGERY

## 2019-10-03 PROCEDURE — 3209999900 FLUORO FOR SURGICAL PROCEDURES

## 2019-10-03 PROCEDURE — 2780000010 HC IMPLANT OTHER: Performed by: NEUROLOGICAL SURGERY

## 2019-10-03 PROCEDURE — C1713 ANCHOR/SCREW BN/BN,TIS/BN: HCPCS | Performed by: NEUROLOGICAL SURGERY

## 2019-10-03 PROCEDURE — 22633 ARTHRD CMBN 1NTRSPC LUMBAR: CPT | Performed by: NEUROLOGICAL SURGERY

## 2019-10-03 PROCEDURE — 6360000002 HC RX W HCPCS

## 2019-10-03 PROCEDURE — 22842 INSERT SPINE FIXATION DEVICE: CPT | Performed by: PHYSICIAN ASSISTANT

## 2019-10-03 PROCEDURE — 3600000015 HC SURGERY LEVEL 5 ADDTL 15MIN: Performed by: NEUROLOGICAL SURGERY

## 2019-10-03 PROCEDURE — 6370000000 HC RX 637 (ALT 250 FOR IP): Performed by: NEUROLOGICAL SURGERY

## 2019-10-03 PROCEDURE — 3700000000 HC ANESTHESIA ATTENDED CARE: Performed by: NEUROLOGICAL SURGERY

## 2019-10-03 PROCEDURE — 00NY0ZZ RELEASE LUMBAR SPINAL CORD, OPEN APPROACH: ICD-10-PCS | Performed by: NEUROLOGICAL SURGERY

## 2019-10-03 PROCEDURE — 95909 NRV CNDJ TST 5-6 STUDIES: CPT | Performed by: AUDIOLOGIST

## 2019-10-03 PROCEDURE — 22853 INSJ BIOMECHANICAL DEVICE: CPT | Performed by: NEUROLOGICAL SURGERY

## 2019-10-03 PROCEDURE — 95940 IONM IN OPERATNG ROOM 15 MIN: CPT | Performed by: AUDIOLOGIST

## 2019-10-03 PROCEDURE — 0SG3071 FUSION OF LUMBOSACRAL JOINT WITH AUTOLOGOUS TISSUE SUBSTITUTE, POSTERIOR APPROACH, POSTERIOR COLUMN, OPEN APPROACH: ICD-10-PCS | Performed by: NEUROLOGICAL SURGERY

## 2019-10-03 PROCEDURE — 82962 GLUCOSE BLOOD TEST: CPT

## 2019-10-03 PROCEDURE — 2500000003 HC RX 250 WO HCPCS

## 2019-10-03 PROCEDURE — 0SG00AJ FUSION OF LUMBAR VERTEBRAL JOINT WITH INTERBODY FUSION DEVICE, POSTERIOR APPROACH, ANTERIOR COLUMN, OPEN APPROACH: ICD-10-PCS | Performed by: NEUROLOGICAL SURGERY

## 2019-10-03 PROCEDURE — 3700000001 HC ADD 15 MINUTES (ANESTHESIA): Performed by: NEUROLOGICAL SURGERY

## 2019-10-03 PROCEDURE — 0SG30AJ FUSION OF LUMBOSACRAL JOINT WITH INTERBODY FUSION DEVICE, POSTERIOR APPROACH, ANTERIOR COLUMN, OPEN APPROACH: ICD-10-PCS | Performed by: NEUROLOGICAL SURGERY

## 2019-10-03 DEVICE — BONE GRAFT KIT 7510800 INFUSE LARGE II
Type: IMPLANTABLE DEVICE | Site: BACK | Status: FUNCTIONAL
Brand: INFUSE® BONE GRAFT

## 2019-10-03 DEVICE — CREO® THREADED 6.5 X 40MM POLYAXIAL SCREW
Type: IMPLANTABLE DEVICE | Site: BACK | Status: FUNCTIONAL
Brand: CREO

## 2019-10-03 DEVICE — 5.5MM CURVED ROD, TITANIUM ALLOY, 65MM LENGTH
Type: IMPLANTABLE DEVICE | Site: BACK | Status: FUNCTIONAL
Brand: CREO

## 2019-10-03 DEVICE — SUSTAIN RADIOLUCENT SPACER, OBLIQUE, SMALL, 10X22, 10MM
Type: IMPLANTABLE DEVICE | Site: BACK | Status: FUNCTIONAL
Brand: SUSTAIN

## 2019-10-03 DEVICE — CREO® THREADED 6.5 X 45MM POLYAXIAL SCREW
Type: IMPLANTABLE DEVICE | Site: BACK | Status: FUNCTIONAL
Brand: CREO

## 2019-10-03 DEVICE — THREADED LOCKING CAP, CREO
Type: IMPLANTABLE DEVICE | Site: BACK | Status: FUNCTIONAL
Brand: CREO

## 2019-10-03 RX ORDER — MORPHINE SULFATE 4 MG/ML
4 INJECTION, SOLUTION INTRAMUSCULAR; INTRAVENOUS
Status: DISCONTINUED | OUTPATIENT
Start: 2019-10-03 | End: 2019-10-06 | Stop reason: HOSPADM

## 2019-10-03 RX ORDER — PROPOFOL 10 MG/ML
INJECTION, EMULSION INTRAVENOUS PRN
Status: DISCONTINUED | OUTPATIENT
Start: 2019-10-03 | End: 2019-10-03 | Stop reason: SDUPTHER

## 2019-10-03 RX ORDER — NICOTINE POLACRILEX 4 MG
15 LOZENGE BUCCAL PRN
Status: DISCONTINUED | OUTPATIENT
Start: 2019-10-03 | End: 2019-10-06 | Stop reason: HOSPADM

## 2019-10-03 RX ORDER — LEVOTHYROXINE SODIUM 0.03 MG/1
25 TABLET ORAL DAILY
Status: DISCONTINUED | OUTPATIENT
Start: 2019-10-04 | End: 2019-10-06 | Stop reason: HOSPADM

## 2019-10-03 RX ORDER — ONDANSETRON 2 MG/ML
INJECTION INTRAMUSCULAR; INTRAVENOUS PRN
Status: DISCONTINUED | OUTPATIENT
Start: 2019-10-03 | End: 2019-10-03 | Stop reason: SDUPTHER

## 2019-10-03 RX ORDER — OXYCODONE HYDROCHLORIDE 5 MG/1
5 TABLET ORAL EVERY 4 HOURS PRN
Status: DISCONTINUED | OUTPATIENT
Start: 2019-10-03 | End: 2019-10-06 | Stop reason: HOSPADM

## 2019-10-03 RX ORDER — SODIUM CHLORIDE 0.9 % (FLUSH) 0.9 %
10 SYRINGE (ML) INJECTION PRN
Status: DISCONTINUED | OUTPATIENT
Start: 2019-10-03 | End: 2019-10-06 | Stop reason: HOSPADM

## 2019-10-03 RX ORDER — SODIUM CHLORIDE 9 MG/ML
INJECTION, SOLUTION INTRAVENOUS CONTINUOUS
Status: DISCONTINUED | OUTPATIENT
Start: 2019-10-03 | End: 2019-10-06 | Stop reason: HOSPADM

## 2019-10-03 RX ORDER — DEXTROSE MONOHYDRATE 50 MG/ML
100 INJECTION, SOLUTION INTRAVENOUS PRN
Status: DISCONTINUED | OUTPATIENT
Start: 2019-10-03 | End: 2019-10-06 | Stop reason: HOSPADM

## 2019-10-03 RX ORDER — ONDANSETRON 2 MG/ML
4 INJECTION INTRAMUSCULAR; INTRAVENOUS
Status: DISCONTINUED | OUTPATIENT
Start: 2019-10-03 | End: 2019-10-03 | Stop reason: HOSPADM

## 2019-10-03 RX ORDER — SENNA AND DOCUSATE SODIUM 50; 8.6 MG/1; MG/1
1 TABLET, FILM COATED ORAL 2 TIMES DAILY
Status: DISCONTINUED | OUTPATIENT
Start: 2019-10-03 | End: 2019-10-06 | Stop reason: HOSPADM

## 2019-10-03 RX ORDER — OXYCODONE HYDROCHLORIDE AND ACETAMINOPHEN 5; 325 MG/1; MG/1
1 TABLET ORAL PRN
Status: DISCONTINUED | OUTPATIENT
Start: 2019-10-03 | End: 2019-10-03 | Stop reason: HOSPADM

## 2019-10-03 RX ORDER — MORPHINE SULFATE 2 MG/ML
2 INJECTION, SOLUTION INTRAMUSCULAR; INTRAVENOUS
Status: DISCONTINUED | OUTPATIENT
Start: 2019-10-03 | End: 2019-10-06 | Stop reason: HOSPADM

## 2019-10-03 RX ORDER — MORPHINE SULFATE 2 MG/ML
1 INJECTION, SOLUTION INTRAMUSCULAR; INTRAVENOUS EVERY 5 MIN PRN
Status: DISCONTINUED | OUTPATIENT
Start: 2019-10-03 | End: 2019-10-03 | Stop reason: HOSPADM

## 2019-10-03 RX ORDER — SODIUM CHLORIDE 0.9 % (FLUSH) 0.9 %
10 SYRINGE (ML) INJECTION EVERY 12 HOURS SCHEDULED
Status: DISCONTINUED | OUTPATIENT
Start: 2019-10-03 | End: 2019-10-03 | Stop reason: HOSPADM

## 2019-10-03 RX ORDER — ATORVASTATIN CALCIUM 10 MG/1
10 TABLET, FILM COATED ORAL EVERY EVENING
Status: DISCONTINUED | OUTPATIENT
Start: 2019-10-03 | End: 2019-10-06 | Stop reason: HOSPADM

## 2019-10-03 RX ORDER — AMLODIPINE BESYLATE 5 MG/1
5 TABLET ORAL DAILY
Status: DISCONTINUED | OUTPATIENT
Start: 2019-10-04 | End: 2019-10-06 | Stop reason: HOSPADM

## 2019-10-03 RX ORDER — PHENYLEPHRINE HYDROCHLORIDE 10 MG/ML
INJECTION INTRAVENOUS PRN
Status: DISCONTINUED | OUTPATIENT
Start: 2019-10-03 | End: 2019-10-03 | Stop reason: SDUPTHER

## 2019-10-03 RX ORDER — MULTIVITAMIN WITH FOLIC ACID 400 MCG
1 TABLET ORAL DAILY
Status: DISCONTINUED | OUTPATIENT
Start: 2019-10-03 | End: 2019-10-06 | Stop reason: HOSPADM

## 2019-10-03 RX ORDER — SODIUM CHLORIDE 0.9 % (FLUSH) 0.9 %
10 SYRINGE (ML) INJECTION PRN
Status: DISCONTINUED | OUTPATIENT
Start: 2019-10-03 | End: 2019-10-03 | Stop reason: HOSPADM

## 2019-10-03 RX ORDER — VANCOMYCIN HYDROCHLORIDE 500 MG/10ML
INJECTION, POWDER, LYOPHILIZED, FOR SOLUTION INTRAVENOUS PRN
Status: DISCONTINUED | OUTPATIENT
Start: 2019-10-03 | End: 2019-10-03 | Stop reason: ALTCHOICE

## 2019-10-03 RX ORDER — MORPHINE SULFATE 2 MG/ML
2 INJECTION, SOLUTION INTRAMUSCULAR; INTRAVENOUS EVERY 5 MIN PRN
Status: DISCONTINUED | OUTPATIENT
Start: 2019-10-03 | End: 2019-10-03 | Stop reason: HOSPADM

## 2019-10-03 RX ORDER — GLYCOPYRROLATE 1 MG/5 ML
SYRINGE (ML) INTRAVENOUS PRN
Status: DISCONTINUED | OUTPATIENT
Start: 2019-10-03 | End: 2019-10-03 | Stop reason: SDUPTHER

## 2019-10-03 RX ORDER — OXYCODONE HYDROCHLORIDE AND ACETAMINOPHEN 5; 325 MG/1; MG/1
2 TABLET ORAL PRN
Status: DISCONTINUED | OUTPATIENT
Start: 2019-10-03 | End: 2019-10-03 | Stop reason: HOSPADM

## 2019-10-03 RX ORDER — MIDAZOLAM HYDROCHLORIDE 1 MG/ML
INJECTION INTRAMUSCULAR; INTRAVENOUS PRN
Status: DISCONTINUED | OUTPATIENT
Start: 2019-10-03 | End: 2019-10-03 | Stop reason: SDUPTHER

## 2019-10-03 RX ORDER — DOCUSATE SODIUM 100 MG/1
100 CAPSULE, LIQUID FILLED ORAL 2 TIMES DAILY
Status: DISCONTINUED | OUTPATIENT
Start: 2019-10-03 | End: 2019-10-06 | Stop reason: HOSPADM

## 2019-10-03 RX ORDER — ROCURONIUM BROMIDE 10 MG/ML
INJECTION, SOLUTION INTRAVENOUS PRN
Status: DISCONTINUED | OUTPATIENT
Start: 2019-10-03 | End: 2019-10-03 | Stop reason: SDUPTHER

## 2019-10-03 RX ORDER — SODIUM CHLORIDE 0.9 % (FLUSH) 0.9 %
10 SYRINGE (ML) INJECTION EVERY 12 HOURS SCHEDULED
Status: DISCONTINUED | OUTPATIENT
Start: 2019-10-03 | End: 2019-10-06 | Stop reason: HOSPADM

## 2019-10-03 RX ORDER — OMEPRAZOLE 20 MG/1
20 CAPSULE, DELAYED RELEASE ORAL DAILY
Status: DISCONTINUED | OUTPATIENT
Start: 2019-10-03 | End: 2019-10-03 | Stop reason: CLARIF

## 2019-10-03 RX ORDER — SODIUM PHOSPHATE, DIBASIC AND SODIUM PHOSPHATE, MONOBASIC 7; 19 G/133ML; G/133ML
1 ENEMA RECTAL DAILY PRN
Status: DISCONTINUED | OUTPATIENT
Start: 2019-10-03 | End: 2019-10-06 | Stop reason: HOSPADM

## 2019-10-03 RX ORDER — FENTANYL CITRATE 50 UG/ML
INJECTION, SOLUTION INTRAMUSCULAR; INTRAVENOUS PRN
Status: DISCONTINUED | OUTPATIENT
Start: 2019-10-03 | End: 2019-10-03 | Stop reason: SDUPTHER

## 2019-10-03 RX ORDER — LISINOPRIL 10 MG/1
10 TABLET ORAL DAILY
Status: DISCONTINUED | OUTPATIENT
Start: 2019-10-04 | End: 2019-10-06 | Stop reason: HOSPADM

## 2019-10-03 RX ORDER — NEOSTIGMINE METHYLSULFATE 1 MG/ML
INJECTION, SOLUTION INTRAVENOUS PRN
Status: DISCONTINUED | OUTPATIENT
Start: 2019-10-03 | End: 2019-10-03 | Stop reason: SDUPTHER

## 2019-10-03 RX ORDER — BENAZEPRIL HYDROCHLORIDE 10 MG/1
10 TABLET ORAL DAILY
Status: DISCONTINUED | OUTPATIENT
Start: 2019-10-04 | End: 2019-10-03 | Stop reason: SDUPTHER

## 2019-10-03 RX ORDER — DEXTROSE MONOHYDRATE 25 G/50ML
12.5 INJECTION, SOLUTION INTRAVENOUS PRN
Status: DISCONTINUED | OUTPATIENT
Start: 2019-10-03 | End: 2019-10-06 | Stop reason: HOSPADM

## 2019-10-03 RX ORDER — BUPIVACAINE HYDROCHLORIDE 2.5 MG/ML
INJECTION, SOLUTION EPIDURAL; INFILTRATION; INTRACAUDAL PRN
Status: DISCONTINUED | OUTPATIENT
Start: 2019-10-03 | End: 2019-10-03 | Stop reason: ALTCHOICE

## 2019-10-03 RX ORDER — OXYCODONE HYDROCHLORIDE 10 MG/1
10 TABLET ORAL EVERY 4 HOURS PRN
Status: DISCONTINUED | OUTPATIENT
Start: 2019-10-03 | End: 2019-10-06 | Stop reason: HOSPADM

## 2019-10-03 RX ORDER — CEFAZOLIN SODIUM 2 G/50ML
2 SOLUTION INTRAVENOUS EVERY 8 HOURS
Status: COMPLETED | OUTPATIENT
Start: 2019-10-03 | End: 2019-10-06

## 2019-10-03 RX ORDER — MEPERIDINE HYDROCHLORIDE 50 MG/ML
12.5 INJECTION INTRAMUSCULAR; INTRAVENOUS; SUBCUTANEOUS
Status: DISCONTINUED | OUTPATIENT
Start: 2019-10-03 | End: 2019-10-03 | Stop reason: HOSPADM

## 2019-10-03 RX ORDER — ASCORBIC ACID 500 MG
500 TABLET ORAL DAILY
Status: DISCONTINUED | OUTPATIENT
Start: 2019-10-03 | End: 2019-10-06 | Stop reason: HOSPADM

## 2019-10-03 RX ORDER — ONDANSETRON 2 MG/ML
4 INJECTION INTRAMUSCULAR; INTRAVENOUS EVERY 6 HOURS PRN
Status: DISCONTINUED | OUTPATIENT
Start: 2019-10-03 | End: 2019-10-06 | Stop reason: HOSPADM

## 2019-10-03 RX ORDER — LIDOCAINE HYDROCHLORIDE AND EPINEPHRINE 5; 5 MG/ML; UG/ML
INJECTION, SOLUTION INFILTRATION; PERINEURAL PRN
Status: DISCONTINUED | OUTPATIENT
Start: 2019-10-03 | End: 2019-10-03 | Stop reason: ALTCHOICE

## 2019-10-03 RX ORDER — CYCLOBENZAPRINE HCL 10 MG
10 TABLET ORAL 3 TIMES DAILY PRN
Status: DISCONTINUED | OUTPATIENT
Start: 2019-10-03 | End: 2019-10-06 | Stop reason: HOSPADM

## 2019-10-03 RX ORDER — DEXAMETHASONE SODIUM PHOSPHATE 10 MG/ML
INJECTION INTRAMUSCULAR; INTRAVENOUS PRN
Status: DISCONTINUED | OUTPATIENT
Start: 2019-10-03 | End: 2019-10-03 | Stop reason: SDUPTHER

## 2019-10-03 RX ORDER — DULOXETIN HYDROCHLORIDE 60 MG/1
60 CAPSULE, DELAYED RELEASE ORAL DAILY
Status: DISCONTINUED | OUTPATIENT
Start: 2019-10-04 | End: 2019-10-06 | Stop reason: HOSPADM

## 2019-10-03 RX ORDER — CEFAZOLIN SODIUM 2 G/50ML
2 SOLUTION INTRAVENOUS
Status: COMPLETED | OUTPATIENT
Start: 2019-10-03 | End: 2019-10-03

## 2019-10-03 RX ORDER — LIDOCAINE HYDROCHLORIDE 20 MG/ML
INJECTION, SOLUTION INTRAVENOUS PRN
Status: DISCONTINUED | OUTPATIENT
Start: 2019-10-03 | End: 2019-10-03 | Stop reason: SDUPTHER

## 2019-10-03 RX ORDER — SODIUM CHLORIDE 9 MG/ML
INJECTION, SOLUTION INTRAVENOUS CONTINUOUS
Status: DISCONTINUED | OUTPATIENT
Start: 2019-10-03 | End: 2019-10-03

## 2019-10-03 RX ORDER — GABAPENTIN 300 MG/1
600 CAPSULE ORAL 3 TIMES DAILY
Status: DISCONTINUED | OUTPATIENT
Start: 2019-10-03 | End: 2019-10-06 | Stop reason: HOSPADM

## 2019-10-03 RX ORDER — PANTOPRAZOLE SODIUM 40 MG/1
40 TABLET, DELAYED RELEASE ORAL
Status: DISCONTINUED | OUTPATIENT
Start: 2019-10-04 | End: 2019-10-06 | Stop reason: HOSPADM

## 2019-10-03 RX ADMIN — FENTANYL CITRATE 50 MCG: 50 INJECTION, SOLUTION INTRAMUSCULAR; INTRAVENOUS at 10:15

## 2019-10-03 RX ADMIN — Medication 2 MG: at 10:55

## 2019-10-03 RX ADMIN — CYCLOBENZAPRINE 10 MG: 10 TABLET, FILM COATED ORAL at 15:12

## 2019-10-03 RX ADMIN — FENTANYL CITRATE 50 MCG: 50 INJECTION, SOLUTION INTRAMUSCULAR; INTRAVENOUS at 09:51

## 2019-10-03 RX ADMIN — FENTANYL CITRATE 50 MCG: 50 INJECTION, SOLUTION INTRAMUSCULAR; INTRAVENOUS at 11:03

## 2019-10-03 RX ADMIN — MIDAZOLAM HYDROCHLORIDE 2 MG: 1 INJECTION, SOLUTION INTRAMUSCULAR; INTRAVENOUS at 08:03

## 2019-10-03 RX ADMIN — MORPHINE SULFATE 4 MG: 4 INJECTION, SOLUTION INTRAMUSCULAR; INTRAVENOUS at 15:12

## 2019-10-03 RX ADMIN — ATORVASTATIN CALCIUM 10 MG: 10 TABLET, FILM COATED ORAL at 17:20

## 2019-10-03 RX ADMIN — CYCLOBENZAPRINE 10 MG: 10 TABLET, FILM COATED ORAL at 20:16

## 2019-10-03 RX ADMIN — PROPOFOL 150 MG: 10 INJECTION, EMULSION INTRAVENOUS at 08:09

## 2019-10-03 RX ADMIN — METFORMIN HYDROCHLORIDE 1000 MG: 1000 TABLET ORAL at 17:12

## 2019-10-03 RX ADMIN — OXYCODONE HYDROCHLORIDE 10 MG: 10 TABLET ORAL at 20:18

## 2019-10-03 RX ADMIN — DEXAMETHASONE SODIUM PHOSPHATE 10 MG: 10 INJECTION INTRAMUSCULAR; INTRAVENOUS at 08:12

## 2019-10-03 RX ADMIN — PHENYLEPHRINE HYDROCHLORIDE 100 MCG/MIN: 10 INJECTION, SOLUTION INTRAMUSCULAR; INTRAVENOUS; SUBCUTANEOUS at 08:49

## 2019-10-03 RX ADMIN — ROCURONIUM BROMIDE 10 MG: 10 INJECTION, SOLUTION INTRAVENOUS at 09:03

## 2019-10-03 RX ADMIN — PHENYLEPHRINE HYDROCHLORIDE 100 MCG: 10 INJECTION INTRAVENOUS at 08:18

## 2019-10-03 RX ADMIN — Medication 0.2 MG: at 10:23

## 2019-10-03 RX ADMIN — HYDROMORPHONE HYDROCHLORIDE 0.5 MG: 1 INJECTION, SOLUTION INTRAMUSCULAR; INTRAVENOUS; SUBCUTANEOUS at 12:00

## 2019-10-03 RX ADMIN — PHENYLEPHRINE HYDROCHLORIDE 100 MCG: 10 INJECTION INTRAVENOUS at 08:29

## 2019-10-03 RX ADMIN — Medication 1 MG: at 10:23

## 2019-10-03 RX ADMIN — FENTANYL CITRATE 100 MCG: 50 INJECTION, SOLUTION INTRAMUSCULAR; INTRAVENOUS at 08:09

## 2019-10-03 RX ADMIN — ROCURONIUM BROMIDE 50 MG: 10 INJECTION, SOLUTION INTRAVENOUS at 08:09

## 2019-10-03 RX ADMIN — Medication 10 ML: at 20:22

## 2019-10-03 RX ADMIN — SODIUM CHLORIDE: 9 INJECTION, SOLUTION INTRAVENOUS at 08:03

## 2019-10-03 RX ADMIN — SENNOSIDES, DOCUSATE SODIUM 1 TABLET: 50; 8.6 TABLET, FILM COATED ORAL at 20:17

## 2019-10-03 RX ADMIN — SODIUM CHLORIDE: 9 INJECTION, SOLUTION INTRAVENOUS at 07:06

## 2019-10-03 RX ADMIN — SODIUM CHLORIDE: 9 INJECTION, SOLUTION INTRAVENOUS at 09:33

## 2019-10-03 RX ADMIN — LIDOCAINE HYDROCHLORIDE 60 MG: 20 INJECTION, SOLUTION INTRAVENOUS at 08:09

## 2019-10-03 RX ADMIN — Medication 0.4 MG: at 10:55

## 2019-10-03 RX ADMIN — SODIUM CHLORIDE: 9 INJECTION, SOLUTION INTRAVENOUS at 20:21

## 2019-10-03 RX ADMIN — HYDROMORPHONE HYDROCHLORIDE 0.5 MG: 1 INJECTION, SOLUTION INTRAMUSCULAR; INTRAVENOUS; SUBCUTANEOUS at 12:10

## 2019-10-03 RX ADMIN — FENTANYL CITRATE 50 MCG: 50 INJECTION, SOLUTION INTRAMUSCULAR; INTRAVENOUS at 11:16

## 2019-10-03 RX ADMIN — PHENYLEPHRINE HYDROCHLORIDE 100 MCG: 10 INJECTION INTRAVENOUS at 08:39

## 2019-10-03 RX ADMIN — PHENYLEPHRINE HYDROCHLORIDE 100 MCG: 10 INJECTION INTRAVENOUS at 08:14

## 2019-10-03 RX ADMIN — ONDANSETRON HYDROCHLORIDE 4 MG: 2 INJECTION, SOLUTION INTRAMUSCULAR; INTRAVENOUS at 10:46

## 2019-10-03 RX ADMIN — PHENYLEPHRINE HYDROCHLORIDE 100 MCG: 10 INJECTION INTRAVENOUS at 08:46

## 2019-10-03 RX ADMIN — DOCUSATE SODIUM 100 MG: 100 CAPSULE, LIQUID FILLED ORAL at 20:17

## 2019-10-03 RX ADMIN — GABAPENTIN 600 MG: 300 CAPSULE ORAL at 20:17

## 2019-10-03 RX ADMIN — CEFAZOLIN SODIUM 2 G: 2 SOLUTION INTRAVENOUS at 08:11

## 2019-10-03 RX ADMIN — CEFAZOLIN SODIUM 2 G: 2 SOLUTION INTRAVENOUS at 17:12

## 2019-10-03 ASSESSMENT — PULMONARY FUNCTION TESTS
PIF_VALUE: 28
PIF_VALUE: 31
PIF_VALUE: 29
PIF_VALUE: 28
PIF_VALUE: 27
PIF_VALUE: 29
PIF_VALUE: 28
PIF_VALUE: 27
PIF_VALUE: 28
PIF_VALUE: 3
PIF_VALUE: 28
PIF_VALUE: 14
PIF_VALUE: 7
PIF_VALUE: 28
PIF_VALUE: 29
PIF_VALUE: 29
PIF_VALUE: 27
PIF_VALUE: 28
PIF_VALUE: 29
PIF_VALUE: 13
PIF_VALUE: 29
PIF_VALUE: 29
PIF_VALUE: 28
PIF_VALUE: 27
PIF_VALUE: 30
PIF_VALUE: 31
PIF_VALUE: 31
PIF_VALUE: 27
PIF_VALUE: 13
PIF_VALUE: 28
PIF_VALUE: 29
PIF_VALUE: 26
PIF_VALUE: 0
PIF_VALUE: 3
PIF_VALUE: 29
PIF_VALUE: 30
PIF_VALUE: 3
PIF_VALUE: 28
PIF_VALUE: 31
PIF_VALUE: 29
PIF_VALUE: 29
PIF_VALUE: 28
PIF_VALUE: 28
PIF_VALUE: 3
PIF_VALUE: 28
PIF_VALUE: 28
PIF_VALUE: 13
PIF_VALUE: 1
PIF_VALUE: 28
PIF_VALUE: 12
PIF_VALUE: 29
PIF_VALUE: 3
PIF_VALUE: 29
PIF_VALUE: 28
PIF_VALUE: 13
PIF_VALUE: 29
PIF_VALUE: 28
PIF_VALUE: 31
PIF_VALUE: 28
PIF_VALUE: 25
PIF_VALUE: 28
PIF_VALUE: 27
PIF_VALUE: 28
PIF_VALUE: 29
PIF_VALUE: 29
PIF_VALUE: 27
PIF_VALUE: 3
PIF_VALUE: 29
PIF_VALUE: 29
PIF_VALUE: 26
PIF_VALUE: 29
PIF_VALUE: 25
PIF_VALUE: 18
PIF_VALUE: 28
PIF_VALUE: 29
PIF_VALUE: 3
PIF_VALUE: 16
PIF_VALUE: 12
PIF_VALUE: 31
PIF_VALUE: 28
PIF_VALUE: 29
PIF_VALUE: 28
PIF_VALUE: 27
PIF_VALUE: 28
PIF_VALUE: 25
PIF_VALUE: 28
PIF_VALUE: 13
PIF_VALUE: 28
PIF_VALUE: 28
PIF_VALUE: 31
PIF_VALUE: 29
PIF_VALUE: 27
PIF_VALUE: 28
PIF_VALUE: 2
PIF_VALUE: 26
PIF_VALUE: 27
PIF_VALUE: 28
PIF_VALUE: 27
PIF_VALUE: 28
PIF_VALUE: 28
PIF_VALUE: 12
PIF_VALUE: 28
PIF_VALUE: 12
PIF_VALUE: 2
PIF_VALUE: 33
PIF_VALUE: 12
PIF_VALUE: 30
PIF_VALUE: 25
PIF_VALUE: 29
PIF_VALUE: 28
PIF_VALUE: 12
PIF_VALUE: 29
PIF_VALUE: 12
PIF_VALUE: 28
PIF_VALUE: 28
PIF_VALUE: 29
PIF_VALUE: 28
PIF_VALUE: 28
PIF_VALUE: 29
PIF_VALUE: 27
PIF_VALUE: 29
PIF_VALUE: 28
PIF_VALUE: 28
PIF_VALUE: 29
PIF_VALUE: 25
PIF_VALUE: 27
PIF_VALUE: 29
PIF_VALUE: 13
PIF_VALUE: 12
PIF_VALUE: 39
PIF_VALUE: 28
PIF_VALUE: 13
PIF_VALUE: 13
PIF_VALUE: 28
PIF_VALUE: 28
PIF_VALUE: 29
PIF_VALUE: 28
PIF_VALUE: 26
PIF_VALUE: 2
PIF_VALUE: 29
PIF_VALUE: 0
PIF_VALUE: 29
PIF_VALUE: 28
PIF_VALUE: 29
PIF_VALUE: 28
PIF_VALUE: 13
PIF_VALUE: 27
PIF_VALUE: 28
PIF_VALUE: 1
PIF_VALUE: 28
PIF_VALUE: 31
PIF_VALUE: 21
PIF_VALUE: 28
PIF_VALUE: 27
PIF_VALUE: 2
PIF_VALUE: 27
PIF_VALUE: 23
PIF_VALUE: 28
PIF_VALUE: 27
PIF_VALUE: 28
PIF_VALUE: 29
PIF_VALUE: 28
PIF_VALUE: 28
PIF_VALUE: 12
PIF_VALUE: 27
PIF_VALUE: 27
PIF_VALUE: 14
PIF_VALUE: 28
PIF_VALUE: 28
PIF_VALUE: 12
PIF_VALUE: 28
PIF_VALUE: 26
PIF_VALUE: 28
PIF_VALUE: 27
PIF_VALUE: 28

## 2019-10-03 ASSESSMENT — PAIN DESCRIPTION - DESCRIPTORS
DESCRIPTORS: ACHING;DISCOMFORT;SORE
DESCRIPTORS: ACHING;BURNING;CONSTANT
DESCRIPTORS: ACHING;CONSTANT;DISCOMFORT;BURNING

## 2019-10-03 ASSESSMENT — PAIN DESCRIPTION - LOCATION
LOCATION: BACK
LOCATION: BACK

## 2019-10-03 ASSESSMENT — PAIN SCALES - GENERAL
PAINLEVEL_OUTOF10: 8
PAINLEVEL_OUTOF10: 7
PAINLEVEL_OUTOF10: 0
PAINLEVEL_OUTOF10: 0
PAINLEVEL_OUTOF10: 8
PAINLEVEL_OUTOF10: 0
PAINLEVEL_OUTOF10: 8

## 2019-10-03 ASSESSMENT — PAIN DESCRIPTION - ONSET: ONSET: ON-GOING

## 2019-10-03 ASSESSMENT — PAIN DESCRIPTION - PAIN TYPE
TYPE: SURGICAL PAIN
TYPE: SURGICAL PAIN

## 2019-10-03 ASSESSMENT — PAIN - FUNCTIONAL ASSESSMENT
PAIN_FUNCTIONAL_ASSESSMENT: PREVENTS OR INTERFERES SOME ACTIVE ACTIVITIES AND ADLS
PAIN_FUNCTIONAL_ASSESSMENT: PREVENTS OR INTERFERES SOME ACTIVE ACTIVITIES AND ADLS
PAIN_FUNCTIONAL_ASSESSMENT: 0-10

## 2019-10-03 ASSESSMENT — PAIN DESCRIPTION - FREQUENCY
FREQUENCY: CONTINUOUS
FREQUENCY: CONTINUOUS

## 2019-10-03 ASSESSMENT — PAIN DESCRIPTION - ORIENTATION: ORIENTATION: LOWER

## 2019-10-03 ASSESSMENT — PAIN DESCRIPTION - PROGRESSION: CLINICAL_PROGRESSION: NOT CHANGED

## 2019-10-04 LAB
ALBUMIN SERPL-MCNC: 3.7 G/DL (ref 3.5–5.2)
ALP BLD-CCNC: 88 U/L (ref 35–104)
ALT SERPL-CCNC: 20 U/L (ref 0–32)
ANION GAP SERPL CALCULATED.3IONS-SCNC: 9 MMOL/L (ref 7–16)
AST SERPL-CCNC: 27 U/L (ref 0–31)
BILIRUB SERPL-MCNC: 0.3 MG/DL (ref 0–1.2)
BUN BLDV-MCNC: 10 MG/DL (ref 6–20)
CALCIUM SERPL-MCNC: 8.7 MG/DL (ref 8.6–10.2)
CHLORIDE BLD-SCNC: 102 MMOL/L (ref 98–107)
CO2: 30 MMOL/L (ref 22–29)
CREAT SERPL-MCNC: 0.6 MG/DL (ref 0.5–1)
GFR AFRICAN AMERICAN: >60
GFR NON-AFRICAN AMERICAN: >60 ML/MIN/1.73
GLUCOSE BLD-MCNC: 147 MG/DL (ref 74–99)
HCT VFR BLD CALC: 34.4 % (ref 34–48)
HEMOGLOBIN: 11.1 G/DL (ref 11.5–15.5)
MCH RBC QN AUTO: 31.1 PG (ref 26–35)
MCHC RBC AUTO-ENTMCNC: 32.3 % (ref 32–34.5)
MCV RBC AUTO: 96.4 FL (ref 80–99.9)
METER GLUCOSE: 119 MG/DL (ref 74–99)
METER GLUCOSE: 128 MG/DL (ref 74–99)
METER GLUCOSE: 150 MG/DL (ref 74–99)
PDW BLD-RTO: 13.2 FL (ref 11.5–15)
PLATELET # BLD: 271 E9/L (ref 130–450)
PMV BLD AUTO: 10.6 FL (ref 7–12)
POTASSIUM SERPL-SCNC: 4.2 MMOL/L (ref 3.5–5)
RBC # BLD: 3.57 E12/L (ref 3.5–5.5)
SODIUM BLD-SCNC: 141 MMOL/L (ref 132–146)
TOTAL PROTEIN: 6.1 G/DL (ref 6.4–8.3)
WBC # BLD: 16.6 E9/L (ref 4.5–11.5)

## 2019-10-04 PROCEDURE — 6370000000 HC RX 637 (ALT 250 FOR IP): Performed by: PHYSICIAN ASSISTANT

## 2019-10-04 PROCEDURE — 36415 COLL VENOUS BLD VENIPUNCTURE: CPT

## 2019-10-04 PROCEDURE — 97535 SELF CARE MNGMENT TRAINING: CPT

## 2019-10-04 PROCEDURE — 97530 THERAPEUTIC ACTIVITIES: CPT

## 2019-10-04 PROCEDURE — 97161 PT EVAL LOW COMPLEX 20 MIN: CPT

## 2019-10-04 PROCEDURE — 82962 GLUCOSE BLOOD TEST: CPT

## 2019-10-04 PROCEDURE — 1200000000 HC SEMI PRIVATE

## 2019-10-04 PROCEDURE — 2700000000 HC OXYGEN THERAPY PER DAY

## 2019-10-04 PROCEDURE — 85027 COMPLETE CBC AUTOMATED: CPT

## 2019-10-04 PROCEDURE — 97165 OT EVAL LOW COMPLEX 30 MIN: CPT

## 2019-10-04 PROCEDURE — 6360000002 HC RX W HCPCS: Performed by: PHYSICIAN ASSISTANT

## 2019-10-04 PROCEDURE — 80053 COMPREHEN METABOLIC PANEL: CPT

## 2019-10-04 PROCEDURE — 2580000003 HC RX 258: Performed by: PHYSICIAN ASSISTANT

## 2019-10-04 RX ADMIN — DOCUSATE SODIUM 100 MG: 100 CAPSULE, LIQUID FILLED ORAL at 08:26

## 2019-10-04 RX ADMIN — CYCLOBENZAPRINE 10 MG: 10 TABLET, FILM COATED ORAL at 15:35

## 2019-10-04 RX ADMIN — GABAPENTIN 600 MG: 300 CAPSULE ORAL at 08:27

## 2019-10-04 RX ADMIN — Medication 10 ML: at 08:28

## 2019-10-04 RX ADMIN — LISINOPRIL 10 MG: 10 TABLET ORAL at 08:27

## 2019-10-04 RX ADMIN — GABAPENTIN 600 MG: 300 CAPSULE ORAL at 21:11

## 2019-10-04 RX ADMIN — METFORMIN HYDROCHLORIDE 1000 MG: 1000 TABLET ORAL at 08:27

## 2019-10-04 RX ADMIN — MORPHINE SULFATE 4 MG: 4 INJECTION, SOLUTION INTRAMUSCULAR; INTRAVENOUS at 04:24

## 2019-10-04 RX ADMIN — CEFAZOLIN SODIUM 2 G: 2 SOLUTION INTRAVENOUS at 01:06

## 2019-10-04 RX ADMIN — AMLODIPINE BESYLATE 5 MG: 5 TABLET ORAL at 08:25

## 2019-10-04 RX ADMIN — CEFAZOLIN SODIUM 2 G: 2 SOLUTION INTRAVENOUS at 18:19

## 2019-10-04 RX ADMIN — OXYCODONE HYDROCHLORIDE 10 MG: 10 TABLET ORAL at 06:09

## 2019-10-04 RX ADMIN — GABAPENTIN 600 MG: 300 CAPSULE ORAL at 15:33

## 2019-10-04 RX ADMIN — OXYCODONE HYDROCHLORIDE 10 MG: 10 TABLET ORAL at 01:06

## 2019-10-04 RX ADMIN — DOCUSATE SODIUM 100 MG: 100 CAPSULE, LIQUID FILLED ORAL at 21:11

## 2019-10-04 RX ADMIN — SENNOSIDES, DOCUSATE SODIUM 1 TABLET: 50; 8.6 TABLET, FILM COATED ORAL at 21:12

## 2019-10-04 RX ADMIN — DULOXETINE HYDROCHLORIDE 60 MG: 60 CAPSULE, DELAYED RELEASE ORAL at 08:26

## 2019-10-04 RX ADMIN — SENNOSIDES, DOCUSATE SODIUM 1 TABLET: 50; 8.6 TABLET, FILM COATED ORAL at 09:03

## 2019-10-04 RX ADMIN — MORPHINE SULFATE 4 MG: 4 INJECTION, SOLUTION INTRAMUSCULAR; INTRAVENOUS at 00:01

## 2019-10-04 RX ADMIN — VITAMIN D, TAB 1000IU (100/BT) 1000 UNITS: 25 TAB at 08:29

## 2019-10-04 RX ADMIN — OXYCODONE HYDROCHLORIDE 10 MG: 10 TABLET ORAL at 15:35

## 2019-10-04 RX ADMIN — Medication 10 ML: at 21:14

## 2019-10-04 RX ADMIN — CEFAZOLIN SODIUM 2 G: 2 SOLUTION INTRAVENOUS at 08:26

## 2019-10-04 RX ADMIN — METFORMIN HYDROCHLORIDE 1000 MG: 1000 TABLET ORAL at 18:19

## 2019-10-04 RX ADMIN — CYCLOBENZAPRINE 10 MG: 10 TABLET, FILM COATED ORAL at 06:09

## 2019-10-04 RX ADMIN — PANTOPRAZOLE SODIUM 40 MG: 40 TABLET, DELAYED RELEASE ORAL at 06:09

## 2019-10-04 RX ADMIN — ATORVASTATIN CALCIUM 10 MG: 10 TABLET, FILM COATED ORAL at 18:19

## 2019-10-04 RX ADMIN — Medication 500 MG: at 08:28

## 2019-10-04 RX ADMIN — LEVOTHYROXINE SODIUM 25 MCG: 25 TABLET ORAL at 06:09

## 2019-10-04 RX ADMIN — MULTIVITAMIN TABLET 1 TABLET: TABLET at 08:28

## 2019-10-04 ASSESSMENT — PAIN DESCRIPTION - LOCATION
LOCATION: BACK

## 2019-10-04 ASSESSMENT — PAIN DESCRIPTION - DESCRIPTORS
DESCRIPTORS: ACHING;DISCOMFORT

## 2019-10-04 ASSESSMENT — PAIN DESCRIPTION - PAIN TYPE
TYPE: SURGICAL PAIN

## 2019-10-04 ASSESSMENT — PAIN SCALES - GENERAL
PAINLEVEL_OUTOF10: 4
PAINLEVEL_OUTOF10: 8
PAINLEVEL_OUTOF10: 9
PAINLEVEL_OUTOF10: 9
PAINLEVEL_OUTOF10: 7
PAINLEVEL_OUTOF10: 8

## 2019-10-05 LAB — METER GLUCOSE: 114 MG/DL (ref 74–99)

## 2019-10-05 PROCEDURE — 6370000000 HC RX 637 (ALT 250 FOR IP): Performed by: PHYSICIAN ASSISTANT

## 2019-10-05 PROCEDURE — 6360000002 HC RX W HCPCS: Performed by: PHYSICIAN ASSISTANT

## 2019-10-05 PROCEDURE — 1200000000 HC SEMI PRIVATE

## 2019-10-05 PROCEDURE — 82962 GLUCOSE BLOOD TEST: CPT

## 2019-10-05 PROCEDURE — 2580000003 HC RX 258: Performed by: PHYSICIAN ASSISTANT

## 2019-10-05 RX ADMIN — DULOXETINE HYDROCHLORIDE 60 MG: 60 CAPSULE, DELAYED RELEASE ORAL at 08:20

## 2019-10-05 RX ADMIN — VITAMIN D, TAB 1000IU (100/BT) 1000 UNITS: 25 TAB at 08:21

## 2019-10-05 RX ADMIN — OXYCODONE HYDROCHLORIDE 10 MG: 10 TABLET ORAL at 02:50

## 2019-10-05 RX ADMIN — CEFAZOLIN SODIUM 2 G: 2 SOLUTION INTRAVENOUS at 01:07

## 2019-10-05 RX ADMIN — PANTOPRAZOLE SODIUM 40 MG: 40 TABLET, DELAYED RELEASE ORAL at 06:41

## 2019-10-05 RX ADMIN — ATORVASTATIN CALCIUM 10 MG: 10 TABLET, FILM COATED ORAL at 17:25

## 2019-10-05 RX ADMIN — MULTIVITAMIN TABLET 1 TABLET: TABLET at 08:21

## 2019-10-05 RX ADMIN — LISINOPRIL 10 MG: 10 TABLET ORAL at 08:21

## 2019-10-05 RX ADMIN — METFORMIN HYDROCHLORIDE 1000 MG: 1000 TABLET ORAL at 08:20

## 2019-10-05 RX ADMIN — DOCUSATE SODIUM 100 MG: 100 CAPSULE, LIQUID FILLED ORAL at 08:20

## 2019-10-05 RX ADMIN — SENNOSIDES, DOCUSATE SODIUM 1 TABLET: 50; 8.6 TABLET, FILM COATED ORAL at 08:20

## 2019-10-05 RX ADMIN — DOCUSATE SODIUM 100 MG: 100 CAPSULE, LIQUID FILLED ORAL at 21:40

## 2019-10-05 RX ADMIN — GABAPENTIN 600 MG: 300 CAPSULE ORAL at 14:05

## 2019-10-05 RX ADMIN — CYCLOBENZAPRINE 10 MG: 10 TABLET, FILM COATED ORAL at 21:40

## 2019-10-05 RX ADMIN — CYCLOBENZAPRINE 10 MG: 10 TABLET, FILM COATED ORAL at 08:20

## 2019-10-05 RX ADMIN — Medication 10 ML: at 21:41

## 2019-10-05 RX ADMIN — LEVOTHYROXINE SODIUM 25 MCG: 25 TABLET ORAL at 06:40

## 2019-10-05 RX ADMIN — AMLODIPINE BESYLATE 5 MG: 5 TABLET ORAL at 08:20

## 2019-10-05 RX ADMIN — OXYCODONE HYDROCHLORIDE 10 MG: 10 TABLET ORAL at 13:53

## 2019-10-05 RX ADMIN — GABAPENTIN 600 MG: 300 CAPSULE ORAL at 21:40

## 2019-10-05 RX ADMIN — Medication 10 ML: at 01:08

## 2019-10-05 RX ADMIN — GABAPENTIN 600 MG: 300 CAPSULE ORAL at 08:20

## 2019-10-05 RX ADMIN — Medication 500 MG: at 08:21

## 2019-10-05 RX ADMIN — Medication 10 ML: at 08:20

## 2019-10-05 RX ADMIN — CEFAZOLIN SODIUM 2 G: 2 SOLUTION INTRAVENOUS at 17:25

## 2019-10-05 RX ADMIN — METFORMIN HYDROCHLORIDE 1000 MG: 1000 TABLET ORAL at 17:24

## 2019-10-05 RX ADMIN — CEFAZOLIN SODIUM 2 G: 2 SOLUTION INTRAVENOUS at 08:21

## 2019-10-05 RX ADMIN — SENNOSIDES, DOCUSATE SODIUM 1 TABLET: 50; 8.6 TABLET, FILM COATED ORAL at 21:40

## 2019-10-05 RX ADMIN — OXYCODONE HYDROCHLORIDE 10 MG: 10 TABLET ORAL at 21:40

## 2019-10-05 ASSESSMENT — PAIN DESCRIPTION - ONSET: ONSET: ON-GOING

## 2019-10-05 ASSESSMENT — PAIN DESCRIPTION - LOCATION
LOCATION: BACK

## 2019-10-05 ASSESSMENT — PAIN DESCRIPTION - DESCRIPTORS
DESCRIPTORS: ACHING;DISCOMFORT
DESCRIPTORS: ACHING;DISCOMFORT;NAGGING
DESCRIPTORS: ACHING;DISCOMFORT;SORE

## 2019-10-05 ASSESSMENT — PAIN DESCRIPTION - ORIENTATION
ORIENTATION: LOWER
ORIENTATION: LOWER

## 2019-10-05 ASSESSMENT — PAIN SCALES - GENERAL
PAINLEVEL_OUTOF10: 0
PAINLEVEL_OUTOF10: 4
PAINLEVEL_OUTOF10: 9
PAINLEVEL_OUTOF10: 8
PAINLEVEL_OUTOF10: 9

## 2019-10-05 ASSESSMENT — PAIN DESCRIPTION - PAIN TYPE
TYPE: SURGICAL PAIN

## 2019-10-05 ASSESSMENT — PAIN DESCRIPTION - FREQUENCY: FREQUENCY: CONTINUOUS

## 2019-10-06 VITALS
WEIGHT: 230 LBS | BODY MASS INDEX: 39.27 KG/M2 | RESPIRATION RATE: 20 BRPM | DIASTOLIC BLOOD PRESSURE: 58 MMHG | TEMPERATURE: 97.3 F | OXYGEN SATURATION: 92 % | HEART RATE: 119 BPM | SYSTOLIC BLOOD PRESSURE: 129 MMHG | HEIGHT: 64 IN

## 2019-10-06 PROCEDURE — 2580000003 HC RX 258: Performed by: PHYSICIAN ASSISTANT

## 2019-10-06 PROCEDURE — 6370000000 HC RX 637 (ALT 250 FOR IP): Performed by: PHYSICIAN ASSISTANT

## 2019-10-06 PROCEDURE — 97530 THERAPEUTIC ACTIVITIES: CPT

## 2019-10-06 PROCEDURE — L0627 LO SAG RI AN/POS PNL PRE CST: HCPCS

## 2019-10-06 PROCEDURE — 6360000002 HC RX W HCPCS: Performed by: PHYSICIAN ASSISTANT

## 2019-10-06 RX ORDER — OXYCODONE HYDROCHLORIDE 5 MG/1
5 TABLET ORAL EVERY 4 HOURS PRN
Qty: 42 TABLET | Refills: 0 | Status: SHIPPED | OUTPATIENT
Start: 2019-10-06 | End: 2019-10-29 | Stop reason: SDUPTHER

## 2019-10-06 RX ORDER — SENNA AND DOCUSATE SODIUM 50; 8.6 MG/1; MG/1
1 TABLET, FILM COATED ORAL 2 TIMES DAILY
Qty: 60 TABLET | Refills: 0 | Status: SHIPPED | OUTPATIENT
Start: 2019-10-06 | End: 2022-01-13

## 2019-10-06 RX ORDER — CYCLOBENZAPRINE HCL 10 MG
10 TABLET ORAL 3 TIMES DAILY PRN
Qty: 30 TABLET | Refills: 0 | Status: SHIPPED | OUTPATIENT
Start: 2019-10-06 | End: 2020-03-02 | Stop reason: SDUPTHER

## 2019-10-06 RX ADMIN — GABAPENTIN 600 MG: 300 CAPSULE ORAL at 08:48

## 2019-10-06 RX ADMIN — CEFAZOLIN SODIUM 2 G: 2 SOLUTION INTRAVENOUS at 01:55

## 2019-10-06 RX ADMIN — LEVOTHYROXINE SODIUM 25 MCG: 25 TABLET ORAL at 06:32

## 2019-10-06 RX ADMIN — DOCUSATE SODIUM 100 MG: 100 CAPSULE, LIQUID FILLED ORAL at 08:47

## 2019-10-06 RX ADMIN — CEFAZOLIN SODIUM 2 G: 2 SOLUTION INTRAVENOUS at 08:56

## 2019-10-06 RX ADMIN — PANTOPRAZOLE SODIUM 40 MG: 40 TABLET, DELAYED RELEASE ORAL at 06:32

## 2019-10-06 RX ADMIN — Medication 10 ML: at 08:51

## 2019-10-06 RX ADMIN — AMLODIPINE BESYLATE 5 MG: 5 TABLET ORAL at 08:45

## 2019-10-06 RX ADMIN — LISINOPRIL 10 MG: 10 TABLET ORAL at 08:48

## 2019-10-06 RX ADMIN — VITAMIN D, TAB 1000IU (100/BT) 1000 UNITS: 25 TAB at 08:50

## 2019-10-06 RX ADMIN — OXYCODONE HYDROCHLORIDE 10 MG: 10 TABLET ORAL at 01:55

## 2019-10-06 RX ADMIN — MULTIVITAMIN TABLET 1 TABLET: TABLET at 08:49

## 2019-10-06 RX ADMIN — DULOXETINE HYDROCHLORIDE 60 MG: 60 CAPSULE, DELAYED RELEASE ORAL at 08:47

## 2019-10-06 RX ADMIN — METFORMIN HYDROCHLORIDE 1000 MG: 1000 TABLET ORAL at 08:49

## 2019-10-06 RX ADMIN — SENNOSIDES, DOCUSATE SODIUM 1 TABLET: 50; 8.6 TABLET, FILM COATED ORAL at 08:56

## 2019-10-06 RX ADMIN — Medication 500 MG: at 08:50

## 2019-10-06 ASSESSMENT — PAIN SCALES - GENERAL: PAINLEVEL_OUTOF10: 8

## 2019-10-06 ASSESSMENT — PAIN DESCRIPTION - DESCRIPTORS: DESCRIPTORS: ACHING;DISCOMFORT

## 2019-10-06 ASSESSMENT — PAIN DESCRIPTION - LOCATION: LOCATION: BACK

## 2019-10-06 ASSESSMENT — PAIN DESCRIPTION - PAIN TYPE: TYPE: SURGICAL PAIN

## 2019-10-09 ENCOUNTER — TELEPHONE (OUTPATIENT)
Dept: NEUROSURGERY | Age: 59
End: 2019-10-09

## 2019-10-09 DIAGNOSIS — M79.89 LEG SWELLING: Primary | ICD-10-CM

## 2019-10-10 ENCOUNTER — HOSPITAL ENCOUNTER (OUTPATIENT)
Dept: ULTRASOUND IMAGING | Age: 59
Discharge: HOME | End: 2019-10-10

## 2019-10-10 DIAGNOSIS — M79.89 LEG SWELLING: ICD-10-CM

## 2019-10-10 PROCEDURE — 93970 EXTREMITY STUDY: CPT

## 2019-10-29 ENCOUNTER — HOSPITAL ENCOUNTER (OUTPATIENT)
Dept: GENERAL RADIOLOGY | Age: 59
Discharge: HOME | End: 2019-10-31

## 2019-10-29 ENCOUNTER — OFFICE VISIT (OUTPATIENT)
Dept: NEUROSURGERY | Age: 59
End: 2019-10-29

## 2019-10-29 ENCOUNTER — HOSPITAL ENCOUNTER (OUTPATIENT)
Age: 59
Discharge: HOME | End: 2019-10-31

## 2019-10-29 VITALS
DIASTOLIC BLOOD PRESSURE: 82 MMHG | HEIGHT: 64 IN | SYSTOLIC BLOOD PRESSURE: 149 MMHG | HEART RATE: 102 BPM | BODY MASS INDEX: 39.48 KG/M2

## 2019-10-29 DIAGNOSIS — M48.062 SPINAL STENOSIS OF LUMBAR REGION WITH NEUROGENIC CLAUDICATION: ICD-10-CM

## 2019-10-29 DIAGNOSIS — M54.16 LUMBAR RADICULOPATHY: ICD-10-CM

## 2019-10-29 PROCEDURE — 99024 POSTOP FOLLOW-UP VISIT: CPT | Performed by: PHYSICIAN ASSISTANT

## 2019-10-29 PROCEDURE — 72100 X-RAY EXAM L-S SPINE 2/3 VWS: CPT

## 2019-10-29 RX ORDER — OXYCODONE HYDROCHLORIDE 5 MG/1
5 TABLET ORAL EVERY 4 HOURS PRN
Qty: 42 TABLET | Refills: 0 | Status: SHIPPED | OUTPATIENT
Start: 2019-10-29 | End: 2019-11-05

## 2019-12-30 ENCOUNTER — HOSPITAL ENCOUNTER (OUTPATIENT)
Age: 59
Discharge: HOME | End: 2020-01-01

## 2019-12-30 ENCOUNTER — OFFICE VISIT (OUTPATIENT)
Dept: NEUROSURGERY | Age: 59
End: 2019-12-30

## 2019-12-30 ENCOUNTER — HOSPITAL ENCOUNTER (OUTPATIENT)
Dept: GENERAL RADIOLOGY | Age: 59
Discharge: HOME | End: 2020-01-01

## 2019-12-30 VITALS
HEIGHT: 64 IN | DIASTOLIC BLOOD PRESSURE: 88 MMHG | HEART RATE: 90 BPM | SYSTOLIC BLOOD PRESSURE: 127 MMHG | WEIGHT: 240 LBS | BODY MASS INDEX: 40.97 KG/M2

## 2019-12-30 DIAGNOSIS — Z98.1 S/P LUMBAR FUSION: Primary | ICD-10-CM

## 2019-12-30 PROCEDURE — 72100 X-RAY EXAM L-S SPINE 2/3 VWS: CPT

## 2019-12-30 PROCEDURE — 99024 POSTOP FOLLOW-UP VISIT: CPT | Performed by: PHYSICIAN ASSISTANT

## 2020-02-05 ENCOUNTER — APPOINTMENT (OUTPATIENT)
Dept: GENERAL RADIOLOGY | Age: 60
End: 2020-02-05

## 2020-02-05 ENCOUNTER — HOSPITAL ENCOUNTER (EMERGENCY)
Age: 60
Discharge: HOME | End: 2020-02-05
Attending: EMERGENCY MEDICINE

## 2020-02-05 VITALS
HEIGHT: 64 IN | HEART RATE: 89 BPM | OXYGEN SATURATION: 98 % | SYSTOLIC BLOOD PRESSURE: 138 MMHG | RESPIRATION RATE: 16 BRPM | DIASTOLIC BLOOD PRESSURE: 79 MMHG | WEIGHT: 240 LBS | BODY MASS INDEX: 40.97 KG/M2 | TEMPERATURE: 96.5 F

## 2020-02-05 PROCEDURE — 6370000000 HC RX 637 (ALT 250 FOR IP): Performed by: PHYSICIAN ASSISTANT

## 2020-02-05 PROCEDURE — 73110 X-RAY EXAM OF WRIST: CPT

## 2020-02-05 PROCEDURE — 96375 TX/PRO/DX INJ NEW DRUG ADDON: CPT

## 2020-02-05 PROCEDURE — 6360000002 HC RX W HCPCS: Performed by: STUDENT IN AN ORGANIZED HEALTH CARE EDUCATION/TRAINING PROGRAM

## 2020-02-05 PROCEDURE — 99284 EMERGENCY DEPT VISIT MOD MDM: CPT

## 2020-02-05 PROCEDURE — 73100 X-RAY EXAM OF WRIST: CPT

## 2020-02-05 PROCEDURE — 25605 CLTX DST RDL FX/EPHYS SEP W/: CPT

## 2020-02-05 PROCEDURE — 71101 X-RAY EXAM UNILAT RIBS/CHEST: CPT

## 2020-02-05 PROCEDURE — 96374 THER/PROPH/DIAG INJ IV PUSH: CPT

## 2020-02-05 RX ORDER — HYDROCODONE BITARTRATE AND ACETAMINOPHEN 5; 325 MG/1; MG/1
1 TABLET ORAL ONCE
Status: COMPLETED | OUTPATIENT
Start: 2020-02-05 | End: 2020-02-05

## 2020-02-05 RX ORDER — IBUPROFEN 400 MG/1
600 TABLET ORAL ONCE
Status: DISCONTINUED | OUTPATIENT
Start: 2020-02-05 | End: 2020-02-05

## 2020-02-05 RX ORDER — ONDANSETRON 2 MG/ML
4 INJECTION INTRAMUSCULAR; INTRAVENOUS ONCE
Status: COMPLETED | OUTPATIENT
Start: 2020-02-05 | End: 2020-02-05

## 2020-02-05 RX ORDER — LIDOCAINE HYDROCHLORIDE 10 MG/ML
10 INJECTION, SOLUTION EPIDURAL; INFILTRATION; INTRACAUDAL; PERINEURAL SEE ADMIN INSTRUCTIONS
Status: DISCONTINUED | OUTPATIENT
Start: 2020-02-05 | End: 2020-02-05 | Stop reason: HOSPADM

## 2020-02-05 RX ORDER — FENTANYL CITRATE 50 UG/ML
100 INJECTION, SOLUTION INTRAMUSCULAR; INTRAVENOUS ONCE
Status: COMPLETED | OUTPATIENT
Start: 2020-02-05 | End: 2020-02-05

## 2020-02-05 RX ORDER — HYDROCODONE BITARTRATE AND ACETAMINOPHEN 5; 325 MG/1; MG/1
1 TABLET ORAL EVERY 6 HOURS PRN
Qty: 12 TABLET | Refills: 0 | Status: SHIPPED | OUTPATIENT
Start: 2020-02-05 | End: 2020-02-12 | Stop reason: SDUPTHER

## 2020-02-05 RX ADMIN — FENTANYL CITRATE 100 MCG: 50 INJECTION, SOLUTION INTRAMUSCULAR; INTRAVENOUS at 19:05

## 2020-02-05 RX ADMIN — HYDROCODONE BITARTRATE AND ACETAMINOPHEN 1 TABLET: 5; 325 TABLET ORAL at 16:46

## 2020-02-05 RX ADMIN — ONDANSETRON 4 MG: 2 INJECTION INTRAMUSCULAR; INTRAVENOUS at 19:04

## 2020-02-05 ASSESSMENT — ENCOUNTER SYMPTOMS
BACK PAIN: 0
COUGH: 0
SHORTNESS OF BREATH: 0
COLOR CHANGE: 0
CHEST TIGHTNESS: 0

## 2020-02-05 ASSESSMENT — PAIN DESCRIPTION - PROGRESSION: CLINICAL_PROGRESSION: GRADUALLY WORSENING

## 2020-02-05 ASSESSMENT — PAIN DESCRIPTION - PAIN TYPE: TYPE: ACUTE PAIN

## 2020-02-05 ASSESSMENT — PAIN DESCRIPTION - DESCRIPTORS: DESCRIPTORS: ACHING

## 2020-02-05 ASSESSMENT — PAIN DESCRIPTION - LOCATION: LOCATION: ARM;HAND

## 2020-02-05 ASSESSMENT — PAIN DESCRIPTION - ORIENTATION: ORIENTATION: RIGHT

## 2020-02-05 ASSESSMENT — PAIN SCALES - GENERAL
PAINLEVEL_OUTOF10: 10
PAINLEVEL_OUTOF10: 7

## 2020-02-05 ASSESSMENT — PAIN DESCRIPTION - FREQUENCY: FREQUENCY: CONTINUOUS

## 2020-02-05 NOTE — ED PROVIDER NOTES
endoscopy; epidural steroid injection (N/A, 8/22/2019); and Lumbar spine surgery (N/A, 10/3/2019). Social History:  reports that she has never smoked. She has never used smokeless tobacco. She reports current drug use. Drug: Marijuana. She reports that she does not drink alcohol. Family History: family history includes Arthritis in an other family member; Cancer in her father and another family member; Depression in an other family member; Diabetes in her mother and another family member; Heart Disease in an other family member; High Blood Pressure in her mother; Hypertension in an other family member. The patients home medications have been reviewed. Allergies: Patient has no known allergies. -------------------------------------------------- RESULTS -------------------------------------------------  All laboratory and radiology results have been personally reviewed by myself   LABS:  No results found for this visit on 02/05/20. RADIOLOGY:  Interpreted by Radiologist.  XR WRIST RIGHT (MIN 3 VIEWS)   Final Result      1. Comminuted and angulated distal radial fracture which extends to   the distal articular surface. 2. Ulnar styloid avulsion. 3. Thoracic dextroscoliosis. 4. No rib fracture or other active process within the chest.      XR RIBS LEFT INCLUDE CHEST (MIN 3 VIEWS)   Final Result      1. Comminuted and angulated distal radial fracture which extends to   the distal articular surface. 2. Ulnar styloid avulsion. 3. Thoracic dextroscoliosis. 4. No rib fracture or other active process within the chest.      XR WRIST RIGHT (2 VIEWS)    (Results Pending)       ------------------------- NURSING NOTES AND VITALS REVIEWED ---------------------------   The nursing notes within the ED encounter and vital signs as below have been reviewed.    BP (!) 141/67   Pulse 96   Temp 96.5 °F (35.8 °C) (Temporal)   Resp 16   Ht 5' 4\" (1.626 m)   Wt 240 lb (108.9 kg)   SpO2 97% Breastfeeding No   BMI 41.20 kg/m²   Oxygen Saturation Interpretation: Normal      ---------------------------------------------------PHYSICAL EXAM--------------------------------------    Physical Exam  Vitals signs and nursing note reviewed. Constitutional:       General: She is not in acute distress. Appearance: Normal appearance. She is well-developed. She is not ill-appearing. Neck:      Musculoskeletal: Normal range of motion and neck supple. No muscular tenderness. Vascular: No JVD. Trachea: No tracheal deviation. Cardiovascular:      Rate and Rhythm: Normal rate and regular rhythm. Heart sounds: Normal heart sounds. No murmur. Pulmonary:      Effort: Pulmonary effort is normal. No respiratory distress. Breath sounds: Normal breath sounds. Musculoskeletal: Normal range of motion. General: No deformity. Comments: TTP over the dorsum of the wrist with small hematoma at radial aspect of the wrist.  Restricted range of motion secondary to pain. Full range of motion of all fingers. Distal sensation intact. 2+ radial pulse. Skin:     General: Skin is warm and dry. Capillary Refill: Capillary refill takes less than 2 seconds. Coloration: Skin is not pale. Findings: No erythema. Neurological:      Mental Status: She is alert and oriented to person, place, and time. Cranial Nerves: No cranial nerve deficit. Psychiatric:         Behavior: Behavior normal.         Thought Content:  Thought content normal.            ------------------------------ ED COURSE/MEDICAL DECISION MAKING----------------------  Medications   lidocaine PF 1 % injection 10 mL (has no administration in time range)   HYDROcodone-acetaminophen (NORCO) 5-325 MG per tablet 1 tablet (1 tablet Oral Given 2/5/20 1646)   ondansetron (ZOFRAN) injection 4 mg (4 mg Intravenous Given 2/5/20 1904)   fentaNYL (SUBLIMAZE) injection 100 mcg (100 mcg Intravenous Given 2/5/20 1905) ED COURSE:      XR WRIST RIGHT (MIN 3 VIEWS)   Final Result      1. Comminuted and angulated distal radial fracture which extends to   the distal articular surface. 2. Ulnar styloid avulsion. 3. Thoracic dextroscoliosis. 4. No rib fracture or other active process within the chest.      XR RIBS LEFT INCLUDE CHEST (MIN 3 VIEWS)   Final Result      1. Comminuted and angulated distal radial fracture which extends to   the distal articular surface. 2. Ulnar styloid avulsion. 3. Thoracic dextroscoliosis. 4. No rib fracture or other active process within the chest.      XR WRIST RIGHT (2 VIEWS)    (Results Pending)         Procedures:  Procedures     Medical Decision Making:   MDM   16-year-old female presenting the ED with right wrist and left rib pain after mechanical fall. She has no signs of rib fracture on x-ray and has no pleuritic chest pain. Wrist does show a comminuted intra-articular and angulated distal radial fracture. She also has an ulnar styloid fracture. Orthopedics was consulted. Refer to consult note. A bedside reduction was performed and the patient was splinted. She is encouraged to follow-up with orthopedics as soon as possible and return with any new or worsening symptoms. Counseling: The emergency provider has spoken with the patient and discussed todays results, in addition to providing specific details for the plan of care and counseling regarding the diagnosis and prognosis. Questions are answered at this time and they are agreeable with the plan.      --------------------------------- IMPRESSION AND DISPOSITION ---------------------------------    IMPRESSION  1. Other closed intra-articular fracture of distal end of right radius, initial encounter    2.  Closed nondisplaced fracture of styloid process of right ulna, initial encounter        DISPOSITION  Disposition: Discharge to home  Patient condition is good      Electronically signed by Yanni Bhakta PA-C   DD:

## 2020-02-05 NOTE — ED NOTES
Patient currently in [address] [place] [address], [address] [place] [address]  02/05/20 0948

## 2020-02-06 NOTE — CONSULTS
or dyspnea on exertion  Respiratory ROS: no cough, shortness of breath, or wheezing  Gastrointestinal ROS: no abdominal pain, change in bowel habits, or black or bloody stools  Neurological ROS: no TIA or stroke symptoms  Musculoskeletal ROS: right wrist pain    PHYSICAL EXAM:    VITALS:  BP (!) 141/67   Pulse 96   Temp 96.5 °F (35.8 °C) (Temporal)   Resp 16   Ht 5' 4\" (1.626 m)   Wt 240 lb (108.9 kg)   SpO2 97%   Breastfeeding No   BMI 41.20 kg/m²   CONSTITUTIONAL:  awake, alert, cooperative, no apparent distress, and appears stated age  MUSCULOSKELETAL:  RUE  · Skin intact  · TTP about wrist  · Compartments soft and compressible  · +2 radial pulses  · SILT m/r/u nerves  · +AIN/PIN/ulnar nerve function  · No pain about elbow or wrist    SECONDARY EXAM    LUE: skin intact, -TTP, Radial pulses +2, cap refill <2 seconds, +sensation to radial/ulnar/median nerves sensation, +motor to AIN/PIN/ulnar nerves, compartments soft and compressible    RLE: skin intack, -TTP, DP/PT pulses +2, cap refill < 2 seconds, + sensation to sp/dp/pt/s/s nerves intact, demonstrates active plantar and dorsiflexion of ankle, compartments soft and compressible    LLE:skin intack, -TTP, DP/PT pulses +2, cap refill < 2 seconds, + sensation to sp/dp/pt/s/s nerves intact, demonstrates active plantar and dorsiflexion of ankle, compartments soft and compressible        DATA:    CBC:   Lab Results   Component Value Date    WBC 16.6 10/04/2019    RBC 3.57 10/04/2019    HGB 11.1 10/04/2019    HCT 34.4 10/04/2019    MCV 96.4 10/04/2019    MCH 31.1 10/04/2019    MCHC 32.3 10/04/2019    RDW 13.2 10/04/2019     10/04/2019    MPV 10.6 10/04/2019     PT/INR:    Lab Results   Component Value Date    PROTIME 11.8 09/26/2019    INR 1.0 09/26/2019     Radiology Review:      XR right wrist: comminuted shortened apex volar dorsally displaced distal radius fracture.  Intra articular and also and ulnar styloid fracture      IMPRESSION:  · Closed right distal radius fracture    PLAN:  · After consent was obtained. Hematoma block performed with fentanyl for pain control.  Closed reduction was performed with application of well padded sugar tong splint        And remained NVI   · NWB RUE  · Pain control per ed  · Ice   · Elevated  · Maintain splint  · Follow up in office  · Discuss with Dr. Med Chin

## 2020-02-07 ENCOUNTER — TELEPHONE (OUTPATIENT)
Dept: ADMINISTRATIVE | Age: 60
End: 2020-02-07

## 2020-02-07 NOTE — TELEPHONE ENCOUNTER
Patient seen in ER for Other closed intra-articular fracture of distal end of right radius, initial encounter, call sent to navigator, Best call back is [phone]

## 2020-02-12 ENCOUNTER — HOSPITAL ENCOUNTER (OUTPATIENT)
Dept: GENERAL RADIOLOGY | Age: 60
Discharge: HOME | End: 2020-02-14

## 2020-02-12 ENCOUNTER — OFFICE VISIT (OUTPATIENT)
Dept: ORTHOPEDIC SURGERY | Age: 60
End: 2020-02-12

## 2020-02-12 VITALS
SYSTOLIC BLOOD PRESSURE: 132 MMHG | WEIGHT: 240 LBS | BODY MASS INDEX: 40.97 KG/M2 | HEART RATE: 93 BPM | HEIGHT: 64 IN | DIASTOLIC BLOOD PRESSURE: 80 MMHG

## 2020-02-12 PROBLEM — S52.601A CLOSED FRACTURE OF DISTAL RADIUS AND ULNA, RIGHT, INITIAL ENCOUNTER: Status: ACTIVE | Noted: 2020-02-12

## 2020-02-12 PROBLEM — S52.501A CLOSED FRACTURE OF DISTAL RADIUS AND ULNA, RIGHT, INITIAL ENCOUNTER: Status: ACTIVE | Noted: 2020-02-12

## 2020-02-12 PROCEDURE — 99212 OFFICE O/P EST SF 10 MIN: CPT | Performed by: ORTHOPAEDIC SURGERY

## 2020-02-12 PROCEDURE — 73110 X-RAY EXAM OF WRIST: CPT

## 2020-02-12 PROCEDURE — 99204 OFFICE O/P NEW MOD 45 MIN: CPT | Performed by: PHYSICIAN ASSISTANT

## 2020-02-12 RX ORDER — HYDROCODONE BITARTRATE AND ACETAMINOPHEN 5; 325 MG/1; MG/1
1 TABLET ORAL EVERY 4 HOURS PRN
Qty: 42 TABLET | Refills: 0 | Status: SHIPPED | OUTPATIENT
Start: 2020-02-12 | End: 2020-02-19

## 2020-02-12 NOTE — PATIENT INSTRUCTIONS
You will need to be medically cleared before surgery by your family doctor. Please call your doctor to set up an appointment for medical clearance as soon as possible and have the office fax your medical clearance to :   (FAX) [phone]   ATTN:  KARINA    1. Your surgery is scheduled for Right wrist open reduction with internal fixation at 2/20/2020  with Dr. Lalito Perdomo MD at the [facility] on [facility] in [facility] . You will need to report to Preop area  that morning at 2 hours prior to surgery    2. You are having Outpatient surgery so you will be returning home the same day  3. Preadmission Testing (PAT) department at [facility] will contact you with all the details prior to surgery. 4. Nothing to eat or drink after midnight the night before surgery. You may take a pain pill and any other medicine PAT instructs you to take with small sip of water if needed. 5. Keep splint clean and dry. Do not remove or get wet. 6. Continue with ice and elevation to reduce swelling  7. No weight bearing right upper, use assistive devices-- continue with sling and ice/elevation  8. Take pain medicine as instructed  9. Call office with any question or concerns: [phone]. Hold Aspirin the day of surgery.  Hold all NSAIDs 7 days prior to surgery

## 2020-02-13 ENCOUNTER — TELEPHONE (OUTPATIENT)
Dept: ORTHOPEDIC SURGERY | Age: 60
End: 2020-02-13

## 2020-02-13 NOTE — PROGRESS NOTES
[facility] [facility] PRE-ADMISSION TESTING GENERAL INSTRUCTIONS- [facility]-phone number:[phone]    GENERAL INSTRUCTIONS  [x] Antibacterial Soap shower Night before and/or AM of Surgery  [] Ben wipe instruction sheet and wipes given. [x] Nothing by mouth after midnight, including gum, candy, mints, or water.  [] You may brush your teeth, gargle, but do NOT swallow water. []Hibiclens shower  the night before and the morning of surgery. Do not use             Hibiclens on your face or head. [x]No smoking, chewing tobacco, illegal drugs, or alcohol within 24 hours of your surgery. [] Jewelry, valuables or body piercing's should not be brought to the hospital. All body and/or tongue piercing's must be removed prior to arriving to hospital.  ALL hair pins must be removed. [x] Do not wear makeup, lotions, powders, deodorant. Nail polish as directed by the nurse. [x] Arrange transportation with a responsible adult  to and from the hospital. If you do not have a responsible adult  to transport you, you will need to make arrangements with a medical transportation company (i.e. [employer]. A Uber/taxi/bus is not appropriate unless you are accompanied by a responsible adult ). Arrange for someone to be with you for the remainder of the day and for 24 hours after your procedure due to having had anesthesia. Who will be your  for transportation?_____HUSBAND_____________   Who will be staying with you for 24 hrs after your procedure?_____HUSBAND_____________  [x] Bring insurance card and photo ID.  [] Transfusion Bracelet: Please bring with you to hospital, day of surgery  [] Bring urine specimen day of surgery. Any small container is acceptable. [] Use inhalers the morning of surgery and bring with you to hospital.  [] Bring copy of living will or healthcare power of  papers to be placed in your electronic record.   [] CPAP/BI-PAP: Please bring your machine if you are to spend the night in the hospital.     PARKING INSTRUCTIONS:   [x] Arrival Time:__0800_________  · [x] Parking lot '\"I\"  is located on [facility] (the corner of [facility] and [facility]). To enter, press the button and the gate will lift. A free token will be provided to exit the lot. One car per patient is allowed to park in this lot. All other cars are to park on [address] [place] [address] either in the parking garage or the handicap lot. [] To reach the [facility] lobby from [address] [place] [address], upon entering the hospital, take elevator B to the 3rd floor. EDUCATION INSTRUCTIONS:      [] Knee or hip replacement booklet & exercise pamphlets given. [] [address] placed in chart. [] Pre-admission Testing educational folder given  [] Incentive Spirometry,coughing & deep breathing exercises reviewed. []Medication information sheet(s)   []Fluoroscopy-Xray used in surgery reviewed with patient. Educational pamphlet placed in chart. []Pain: Post-op pain is normal and to be expected. You will be asked to rate your pain from 0-10(a zero is not acceptable-education is needed). Your post-op pain goal is:  [] Ask your nurse for your pain medication. [] Joint camp offered. [] Joint replacement booklets given. [] Other:___________________________    MEDICATION INSTRUCTIONS:   [x]Bring a complete list of your medications, please write the last time you took the medicine, give this list to the nurse. [x] Take the following medications the morning of surgery with 1-2 ounces of water: SEE MED LIST  [x] Stop herbal supplements and vitamins 5 days before your surgery. [x] DO NOT take any diabetic medicine the morning of surgery. Follow instructions for insulin the day before surgery. [] If you are diabetic and your blood sugar is low or you feel symptomatic, you may drink 1-2 ounces of apple juice or take a glucose tablet.   The morning of your procedure, you may call the

## 2020-02-14 ENCOUNTER — PREP FOR PROCEDURE (OUTPATIENT)
Dept: ORTHOPEDIC SURGERY | Age: 60
End: 2020-02-14

## 2020-02-14 RX ORDER — CEFAZOLIN SODIUM 2 G/50ML
2 SOLUTION INTRAVENOUS
Status: CANCELLED | OUTPATIENT
Start: 2020-02-14 | End: 2020-02-14

## 2020-02-14 RX ORDER — SODIUM CHLORIDE 0.9 % (FLUSH) 0.9 %
10 SYRINGE (ML) INJECTION EVERY 12 HOURS SCHEDULED
Status: CANCELLED | OUTPATIENT
Start: 2020-02-14

## 2020-02-14 RX ORDER — SODIUM CHLORIDE, SODIUM LACTATE, POTASSIUM CHLORIDE, CALCIUM CHLORIDE 600; 310; 30; 20 MG/100ML; MG/100ML; MG/100ML; MG/100ML
INJECTION, SOLUTION INTRAVENOUS CONTINUOUS
Status: CANCELLED | OUTPATIENT
Start: 2020-02-14

## 2020-02-14 RX ORDER — SODIUM CHLORIDE 0.9 % (FLUSH) 0.9 %
10 SYRINGE (ML) INJECTION PRN
Status: CANCELLED | OUTPATIENT
Start: 2020-02-14

## 2020-02-14 NOTE — PROGRESS NOTES
current facility-administered medications for this visit. Allergies: Patient has no known allergies. Past Medical History:   Diagnosis Date    Arthritis     Depression     DM (diabetes mellitus) ([facility]     Hypertension     IBS (irritable bowel syndrome)     Sleep apnea     DOES NOT WEAR MASK     Past Surgical History:   Procedure Laterality Date    COLONOSCOPY      EPIDURAL STEROID INJECTION N/A 8/22/2019    LUMBAR EPIDURAL STEROID INJECTION L3-4 WITH LOW VOLUME performed by Fritz Quinteros MD at [address] [place] [address] N/A 10/3/2019    L4-L5 , L5-S1 POSTERIOR INTERBODY FUSION --OARM, AUDIOLOGY, CAGES, PLATES, SCREWS, NIMISHA, NIRAV TABLE, CELL SAVER, PLATELET GEL, GLOBUS performed by Nayely Sanchez MD at [facility] OR    UPPER GASTROINTESTINAL ENDOSCOPY       Family History   Problem Relation Age of Onset    Arthritis Other     Cancer Other     Depression Other     Diabetes Other     Heart Disease Other     Hypertension Other     Diabetes Mother     High Blood Pressure Mother     Cancer Father      Social History     Tobacco Use    Smoking status: Never Smoker    Smokeless tobacco: Never Used   Substance Use Topics    Alcohol use: No                             Chief Complaint   Patient presents with    ED Follow-up     Ed follow up right distal radius fx. xrays completed. SUBJECTIVE: Geoffrey Ocampo is here for initial evaluation for their right wrist. States that they had injured it after falling. DOI: 2/5/2020. Was seen in ER and XRs revealed a distal radius fracture, Patient was seen in the ED by orthopedic resident and had closed reduction performed. They were placed in a sugartong splint and referred here. Denies any other injuries, and no issues with this wrist prior to injury. Denies any numbness or tingling. Pain tolerable currently with medications. Patient is RHD.      Review of Systems   Constitutional: Negative for fever, chills, diaphoresis, appetite change and subsequent surgery, dislocation, and blood clots as well as medical related problems and other problems not specifically discussed. Risk of anesthesia also discussed to include death. Post-op care, work, activity and restrictions which included the use of pain medication and possibility of using blood thinner post op were also discussed with Adonay Cote and she verbalized and agreed with the restrictions. PLAN:  Plan for OR on 2/20/2020 with Dr. Sunita Jacob MD  Pre-surgery medical clearance  NPO after midnight the night before surgery  NWB on right upper extremity  Splint care  Hold NSAIDS 7 days prior to surgery  Hold aspirin 1 prior to surgery    Controlled Substance Monitoring:    Acute and Chronic Pain Monitoring:   RX Monitoring 2/12/2020   Periodic Controlled Substance Monitoring No signs of potential drug abuse or diversion identified. Electronically signed by Lew Merritt PA-C on 2/14/2020 at 9:57 AM  Note: This report was completed using [employer] voiced recognition software. Every effort has been made to ensure accuracy; however, inadvertent computerized transcription errors may be present.
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
## 2020-02-17 ENCOUNTER — TELEPHONE (OUTPATIENT)
Dept: ORTHOPEDIC SURGERY | Age: 60
End: 2020-02-17

## 2020-02-18 ENCOUNTER — TELEPHONE (OUTPATIENT)
Dept: ORTHOPEDIC SURGERY | Age: 60
End: 2020-02-18

## 2020-02-20 ENCOUNTER — APPOINTMENT (OUTPATIENT)
Dept: GENERAL RADIOLOGY | Age: 60
End: 2020-02-20
Attending: ORTHOPAEDIC SURGERY

## 2020-02-20 ENCOUNTER — ANESTHESIA EVENT (OUTPATIENT)
Dept: OPERATING ROOM | Age: 60
End: 2020-02-20

## 2020-02-20 ENCOUNTER — HOSPITAL ENCOUNTER (OUTPATIENT)
Age: 60
Setting detail: OUTPATIENT SURGERY
Discharge: HOME | End: 2020-02-20
Attending: ORTHOPAEDIC SURGERY | Admitting: ORTHOPAEDIC SURGERY

## 2020-02-20 ENCOUNTER — ANESTHESIA (OUTPATIENT)
Dept: OPERATING ROOM | Age: 60
End: 2020-02-20

## 2020-02-20 VITALS
OXYGEN SATURATION: 94 % | RESPIRATION RATE: 18 BRPM | HEART RATE: 109 BPM | SYSTOLIC BLOOD PRESSURE: 129 MMHG | HEIGHT: 64 IN | TEMPERATURE: 97.1 F | WEIGHT: 240 LBS | BODY MASS INDEX: 40.97 KG/M2 | DIASTOLIC BLOOD PRESSURE: 93 MMHG

## 2020-02-20 VITALS — OXYGEN SATURATION: 100 % | SYSTOLIC BLOOD PRESSURE: 150 MMHG | DIASTOLIC BLOOD PRESSURE: 101 MMHG

## 2020-02-20 LAB
ANION GAP SERPL CALCULATED.3IONS-SCNC: 13 MMOL/L (ref 7–16)
BUN BLDV-MCNC: 13 MG/DL (ref 6–20)
CALCIUM SERPL-MCNC: 9.2 MG/DL (ref 8.6–10.2)
CHLORIDE BLD-SCNC: 102 MMOL/L (ref 98–107)
CHP ED QC CHECK: NORMAL
CO2: 24 MMOL/L (ref 22–29)
CREAT SERPL-MCNC: 0.6 MG/DL (ref 0.5–1)
GFR AFRICAN AMERICAN: >60
GFR NON-AFRICAN AMERICAN: >60 ML/MIN/1.73
GLUCOSE BLD-MCNC: 102 MG/DL (ref 74–99)
GLUCOSE BLD-MCNC: 110 MG/DL
HCT VFR BLD CALC: 37.3 % (ref 34–48)
HEMOGLOBIN: 11.4 G/DL (ref 11.5–15.5)
MCH RBC QN AUTO: 27 PG (ref 26–35)
MCHC RBC AUTO-ENTMCNC: 30.6 % (ref 32–34.5)
MCV RBC AUTO: 88.2 FL (ref 80–99.9)
METER GLUCOSE: 110 MG/DL (ref 74–99)
PDW BLD-RTO: 15.8 FL (ref 11.5–15)
PLATELET # BLD: 305 E9/L (ref 130–450)
PMV BLD AUTO: 9.8 FL (ref 7–12)
POTASSIUM SERPL-SCNC: 4.2 MMOL/L (ref 3.5–5)
RBC # BLD: 4.23 E12/L (ref 3.5–5.5)
SODIUM BLD-SCNC: 139 MMOL/L (ref 132–146)
WBC # BLD: 8.8 E9/L (ref 4.5–11.5)

## 2020-02-20 PROCEDURE — 3700000000 HC ANESTHESIA ATTENDED CARE: Performed by: ORTHOPAEDIC SURGERY

## 2020-02-20 PROCEDURE — 2580000003 HC RX 258: Performed by: PHYSICIAN ASSISTANT

## 2020-02-20 PROCEDURE — 3600000004 HC SURGERY LEVEL 4 BASE: Performed by: ORTHOPAEDIC SURGERY

## 2020-02-20 PROCEDURE — 7100000010 HC PHASE II RECOVERY - FIRST 15 MIN: Performed by: ORTHOPAEDIC SURGERY

## 2020-02-20 PROCEDURE — 6360000002 HC RX W HCPCS: Performed by: ANESTHESIOLOGY

## 2020-02-20 PROCEDURE — 2500000003 HC RX 250 WO HCPCS: Performed by: NURSE ANESTHETIST, CERTIFIED REGISTERED

## 2020-02-20 PROCEDURE — 6360000002 HC RX W HCPCS: Performed by: NURSE ANESTHETIST, CERTIFIED REGISTERED

## 2020-02-20 PROCEDURE — 73110 X-RAY EXAM OF WRIST: CPT

## 2020-02-20 PROCEDURE — 7100000001 HC PACU RECOVERY - ADDTL 15 MIN: Performed by: ORTHOPAEDIC SURGERY

## 2020-02-20 PROCEDURE — 64415 NJX AA&/STRD BRCH PLXS IMG: CPT | Performed by: ANESTHESIOLOGY

## 2020-02-20 PROCEDURE — 2720000010 HC SURG SUPPLY STERILE: Performed by: ORTHOPAEDIC SURGERY

## 2020-02-20 PROCEDURE — 36415 COLL VENOUS BLD VENIPUNCTURE: CPT

## 2020-02-20 PROCEDURE — 80048 BASIC METABOLIC PNL TOTAL CA: CPT

## 2020-02-20 PROCEDURE — 82962 GLUCOSE BLOOD TEST: CPT

## 2020-02-20 PROCEDURE — 6360000002 HC RX W HCPCS: Performed by: PHYSICIAN ASSISTANT

## 2020-02-20 PROCEDURE — C1713 ANCHOR/SCREW BN/BN,TIS/BN: HCPCS | Performed by: ORTHOPAEDIC SURGERY

## 2020-02-20 PROCEDURE — 2709999900 HC NON-CHARGEABLE SUPPLY: Performed by: ORTHOPAEDIC SURGERY

## 2020-02-20 PROCEDURE — 3700000001 HC ADD 15 MINUTES (ANESTHESIA): Performed by: ORTHOPAEDIC SURGERY

## 2020-02-20 PROCEDURE — 7100000011 HC PHASE II RECOVERY - ADDTL 15 MIN: Performed by: ORTHOPAEDIC SURGERY

## 2020-02-20 PROCEDURE — 3209999900 FLUORO FOR SURGICAL PROCEDURES

## 2020-02-20 PROCEDURE — 3600000014 HC SURGERY LEVEL 4 ADDTL 15MIN: Performed by: ORTHOPAEDIC SURGERY

## 2020-02-20 PROCEDURE — 25608 OPTX DST RD XART FX/EPI SEP2: CPT | Performed by: ORTHOPAEDIC SURGERY

## 2020-02-20 PROCEDURE — 7100000000 HC PACU RECOVERY - FIRST 15 MIN: Performed by: ORTHOPAEDIC SURGERY

## 2020-02-20 PROCEDURE — 85027 COMPLETE CBC AUTOMATED: CPT

## 2020-02-20 DEVICE — SCREW BNE L24MM DIA2.4MM DST RAD VOLAR S STL ST VAR ANG LOK: Type: IMPLANTABLE DEVICE | Site: WRIST | Status: FUNCTIONAL

## 2020-02-20 DEVICE — SCREW BNE L22MM DIA2.4MM DST RAD VOLAR S STL ST VAR ANG LOK: Type: IMPLANTABLE DEVICE | Site: WRIST | Status: FUNCTIONAL

## 2020-02-20 DEVICE — PLATE BNE W22XL54MM STD 6X3 H ST R DST RAD VOLAR S STL VAR: Type: IMPLANTABLE DEVICE | Site: WRIST | Status: FUNCTIONAL

## 2020-02-20 DEVICE — SCREW BNE L18MM DIA2.4MM DST RAD VOLAR S STL ST VAR ANG LOK: Type: IMPLANTABLE DEVICE | Site: WRIST | Status: FUNCTIONAL

## 2020-02-20 DEVICE — SCREW BNE L12MM DIA2.7MM CORT S STL ST T8 STARDRV RECESS: Type: IMPLANTABLE DEVICE | Site: WRIST | Status: FUNCTIONAL

## 2020-02-20 DEVICE — SCREW BNE L20MM DIA2.4MM DST RAD VOLAR S STL ST VAR ANG LOK: Type: IMPLANTABLE DEVICE | Site: WRIST | Status: FUNCTIONAL

## 2020-02-20 DEVICE — SCREW BNE L14MM DIA2.7MM CORT S STL ST T8 STARDRV RECESS: Type: IMPLANTABLE DEVICE | Site: WRIST | Status: FUNCTIONAL

## 2020-02-20 RX ORDER — KETAMINE HCL IN NACL, ISO-OSM 100MG/10ML
SYRINGE (ML) INJECTION PRN
Status: DISCONTINUED | OUTPATIENT
Start: 2020-02-20 | End: 2020-02-20 | Stop reason: SDUPTHER

## 2020-02-20 RX ORDER — SUCCINYLCHOLINE/SOD CL,ISO/PF 200MG/10ML
SYRINGE (ML) INTRAVENOUS PRN
Status: DISCONTINUED | OUTPATIENT
Start: 2020-02-20 | End: 2020-02-20 | Stop reason: SDUPTHER

## 2020-02-20 RX ORDER — OXYCODONE HYDROCHLORIDE AND ACETAMINOPHEN 5; 325 MG/1; MG/1
1 TABLET ORAL
Status: DISCONTINUED | OUTPATIENT
Start: 2020-02-20 | End: 2020-02-20 | Stop reason: HOSPADM

## 2020-02-20 RX ORDER — SODIUM CHLORIDE 0.9 % (FLUSH) 0.9 %
10 SYRINGE (ML) INJECTION PRN
Status: DISCONTINUED | OUTPATIENT
Start: 2020-02-20 | End: 2020-02-20 | Stop reason: HOSPADM

## 2020-02-20 RX ORDER — ROPIVACAINE HYDROCHLORIDE 5 MG/ML
30 INJECTION, SOLUTION EPIDURAL; INFILTRATION; PERINEURAL ONCE
Status: COMPLETED | OUTPATIENT
Start: 2020-02-20 | End: 2020-02-20

## 2020-02-20 RX ORDER — FENTANYL CITRATE 50 UG/ML
50 INJECTION, SOLUTION INTRAMUSCULAR; INTRAVENOUS EVERY 10 MIN PRN
Status: DISCONTINUED | OUTPATIENT
Start: 2020-02-20 | End: 2020-02-20 | Stop reason: HOSPADM

## 2020-02-20 RX ORDER — HYDROCODONE BITARTRATE AND ACETAMINOPHEN 5; 325 MG/1; MG/1
1 TABLET ORAL EVERY 4 HOURS PRN
Qty: 40 TABLET | Refills: 0 | Status: SHIPPED | OUTPATIENT
Start: 2020-02-20 | End: 2022-08-11 | Stop reason: SDUPTHER

## 2020-02-20 RX ORDER — LIDOCAINE HYDROCHLORIDE 20 MG/ML
INJECTION, SOLUTION INTRAVENOUS PRN
Status: DISCONTINUED | OUTPATIENT
Start: 2020-02-20 | End: 2020-02-20 | Stop reason: SDUPTHER

## 2020-02-20 RX ORDER — MIDAZOLAM HYDROCHLORIDE 1 MG/ML
INJECTION INTRAMUSCULAR; INTRAVENOUS PRN
Status: DISCONTINUED | OUTPATIENT
Start: 2020-02-20 | End: 2020-02-20 | Stop reason: SDUPTHER

## 2020-02-20 RX ORDER — PROPOFOL 10 MG/ML
INJECTION, EMULSION INTRAVENOUS PRN
Status: DISCONTINUED | OUTPATIENT
Start: 2020-02-20 | End: 2020-02-20 | Stop reason: SDUPTHER

## 2020-02-20 RX ORDER — ROCURONIUM BROMIDE 10 MG/ML
INJECTION, SOLUTION INTRAVENOUS PRN
Status: DISCONTINUED | OUTPATIENT
Start: 2020-02-20 | End: 2020-02-20 | Stop reason: SDUPTHER

## 2020-02-20 RX ORDER — SODIUM CHLORIDE 0.9 % (FLUSH) 0.9 %
10 SYRINGE (ML) INJECTION EVERY 12 HOURS SCHEDULED
Status: DISCONTINUED | OUTPATIENT
Start: 2020-02-20 | End: 2020-02-20 | Stop reason: HOSPADM

## 2020-02-20 RX ORDER — FENTANYL CITRATE 50 UG/ML
INJECTION, SOLUTION INTRAMUSCULAR; INTRAVENOUS PRN
Status: DISCONTINUED | OUTPATIENT
Start: 2020-02-20 | End: 2020-02-20 | Stop reason: SDUPTHER

## 2020-02-20 RX ORDER — MIDAZOLAM HYDROCHLORIDE 1 MG/ML
1 INJECTION INTRAMUSCULAR; INTRAVENOUS PRN
Status: DISCONTINUED | OUTPATIENT
Start: 2020-02-20 | End: 2020-02-20 | Stop reason: HOSPADM

## 2020-02-20 RX ORDER — GLYCOPYRROLATE 1 MG/5 ML
SYRINGE (ML) INTRAVENOUS PRN
Status: DISCONTINUED | OUTPATIENT
Start: 2020-02-20 | End: 2020-02-20 | Stop reason: SDUPTHER

## 2020-02-20 RX ORDER — SODIUM CHLORIDE, SODIUM LACTATE, POTASSIUM CHLORIDE, CALCIUM CHLORIDE 600; 310; 30; 20 MG/100ML; MG/100ML; MG/100ML; MG/100ML
INJECTION, SOLUTION INTRAVENOUS CONTINUOUS
Status: DISCONTINUED | OUTPATIENT
Start: 2020-02-20 | End: 2020-02-20 | Stop reason: HOSPADM

## 2020-02-20 RX ORDER — CEFAZOLIN SODIUM 2 G/50ML
2 SOLUTION INTRAVENOUS
Status: COMPLETED | OUTPATIENT
Start: 2020-02-20 | End: 2020-02-20

## 2020-02-20 RX ORDER — ONDANSETRON 2 MG/ML
INJECTION INTRAMUSCULAR; INTRAVENOUS PRN
Status: DISCONTINUED | OUTPATIENT
Start: 2020-02-20 | End: 2020-02-20 | Stop reason: SDUPTHER

## 2020-02-20 RX ORDER — DEXAMETHASONE SODIUM PHOSPHATE 10 MG/ML
INJECTION, SOLUTION INTRAMUSCULAR; INTRAVENOUS PRN
Status: DISCONTINUED | OUTPATIENT
Start: 2020-02-20 | End: 2020-02-20 | Stop reason: SDUPTHER

## 2020-02-20 RX ORDER — NEOSTIGMINE METHYLSULFATE 1 MG/ML
INJECTION, SOLUTION INTRAVENOUS PRN
Status: DISCONTINUED | OUTPATIENT
Start: 2020-02-20 | End: 2020-02-20 | Stop reason: SDUPTHER

## 2020-02-20 RX ADMIN — FENTANYL CITRATE 50 MCG: 50 INJECTION, SOLUTION INTRAMUSCULAR; INTRAVENOUS at 11:22

## 2020-02-20 RX ADMIN — MIDAZOLAM 2 MG: 1 INJECTION INTRAMUSCULAR; INTRAVENOUS at 10:19

## 2020-02-20 RX ADMIN — Medication 3 MG: at 11:38

## 2020-02-20 RX ADMIN — ONDANSETRON HYDROCHLORIDE 4 MG: 2 INJECTION, SOLUTION INTRAMUSCULAR; INTRAVENOUS at 10:39

## 2020-02-20 RX ADMIN — CEFAZOLIN SODIUM 2 G: 2 SOLUTION INTRAVENOUS at 10:36

## 2020-02-20 RX ADMIN — ROCURONIUM BROMIDE 10 MG: 10 INJECTION, SOLUTION INTRAVENOUS at 10:33

## 2020-02-20 RX ADMIN — ROCURONIUM BROMIDE 10 MG: 10 INJECTION, SOLUTION INTRAVENOUS at 10:59

## 2020-02-20 RX ADMIN — Medication 50 MG: at 10:39

## 2020-02-20 RX ADMIN — ROPIVACAINE HYDROCHLORIDE 30 ML: 5 INJECTION EPIDURAL; INFILTRATION; PERINEURAL at 10:25

## 2020-02-20 RX ADMIN — Medication 0.6 MG: at 11:38

## 2020-02-20 RX ADMIN — DEXAMETHASONE SODIUM PHOSPHATE 10 MG: 10 INJECTION INTRAMUSCULAR; INTRAVENOUS at 10:39

## 2020-02-20 RX ADMIN — Medication 140 MG: at 10:34

## 2020-02-20 RX ADMIN — LIDOCAINE HYDROCHLORIDE 100 MG: 20 INJECTION, SOLUTION INTRAVENOUS at 10:33

## 2020-02-20 RX ADMIN — PROPOFOL 200 MG: 10 INJECTION, EMULSION INTRAVENOUS at 10:33

## 2020-02-20 RX ADMIN — FENTANYL CITRATE 100 MCG: 50 INJECTION, SOLUTION INTRAMUSCULAR; INTRAVENOUS at 10:19

## 2020-02-20 RX ADMIN — SODIUM CHLORIDE, POTASSIUM CHLORIDE, SODIUM LACTATE AND CALCIUM CHLORIDE: 600; 310; 30; 20 INJECTION, SOLUTION INTRAVENOUS at 09:05

## 2020-02-20 ASSESSMENT — PULMONARY FUNCTION TESTS
PIF_VALUE: 20
PIF_VALUE: 24
PIF_VALUE: 24
PIF_VALUE: 26
PIF_VALUE: 24
PIF_VALUE: 27
PIF_VALUE: 24
PIF_VALUE: 27
PIF_VALUE: 24
PIF_VALUE: 27
PIF_VALUE: 28
PIF_VALUE: 3
PIF_VALUE: 24
PIF_VALUE: 23
PIF_VALUE: 24
PIF_VALUE: 28
PIF_VALUE: 1
PIF_VALUE: 2
PIF_VALUE: 26
PIF_VALUE: 20
PIF_VALUE: 24
PIF_VALUE: 21
PIF_VALUE: 0
PIF_VALUE: 26
PIF_VALUE: 20
PIF_VALUE: 24
PIF_VALUE: 26
PIF_VALUE: 24
PIF_VALUE: 23
PIF_VALUE: 20
PIF_VALUE: 23
PIF_VALUE: 20
PIF_VALUE: 23
PIF_VALUE: 20
PIF_VALUE: 27
PIF_VALUE: 24
PIF_VALUE: 20
PIF_VALUE: 24
PIF_VALUE: 24
PIF_VALUE: 1
PIF_VALUE: 27
PIF_VALUE: 24
PIF_VALUE: 32
PIF_VALUE: 20
PIF_VALUE: 24
PIF_VALUE: 2
PIF_VALUE: 28
PIF_VALUE: 24
PIF_VALUE: 24
PIF_VALUE: 28
PIF_VALUE: 24
PIF_VALUE: 26
PIF_VALUE: 23
PIF_VALUE: 41
PIF_VALUE: 14
PIF_VALUE: 23
PIF_VALUE: 21
PIF_VALUE: 0
PIF_VALUE: 24
PIF_VALUE: 19
PIF_VALUE: 23
PIF_VALUE: 20
PIF_VALUE: 24
PIF_VALUE: 23
PIF_VALUE: 3
PIF_VALUE: 20
PIF_VALUE: 23
PIF_VALUE: 24
PIF_VALUE: 24
PIF_VALUE: 20
PIF_VALUE: 27
PIF_VALUE: 20
PIF_VALUE: 24
PIF_VALUE: 27
PIF_VALUE: 1
PIF_VALUE: 23
PIF_VALUE: 26
PIF_VALUE: 24

## 2020-02-20 ASSESSMENT — PAIN - FUNCTIONAL ASSESSMENT: PAIN_FUNCTIONAL_ASSESSMENT: 0-10

## 2020-02-20 ASSESSMENT — PAIN SCALES - GENERAL
PAINLEVEL_OUTOF10: 0
PAINLEVEL_OUTOF10: 0
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
## 2020-02-20 NOTE — ANESTHESIA PROCEDURE NOTES
Peripheral Block    Patient location during procedure: procedure area  End time: 2/20/2020 10:20 AM  Staffing  Anesthesiologist: Irais Martin DO  Performed: anesthesiologist   Preanesthetic Checklist  Completed: patient identified, site marked, surgical consent, pre-op evaluation, timeout performed, IV checked, risks and benefits discussed, monitors and equipment checked, anesthesia consent given, oxygen available and patient being monitored  Peripheral Block  Patient position: supine  Prep: ChloraPrep  Patient monitoring: cardiac monitor, continuous pulse ox, frequent blood pressure checks and IV access  Block type: Brachial plexus  Laterality: right  Injection technique: single-shot  Procedures: ultrasound guided and nerve stimulator  Local infiltration: ropivacaine  Infiltration strength: 0.5 %  Dose: 30 mL  Supraclavicular  Provider prep: mask and sterile gloves  Local infiltration: ropivacaine  Needle  Needle type:  Other (Stimuplex)   Needle gauge: 22 G  Needle length: 5 cm  Needle localization: nerve stimulator and ultrasound guidance  Assessment  Injection assessment: negative aspiration for heme, no paresthesia on injection and local visualized surrounding nerve on ultrasound  Slow fractionated injection: yes  Hemodynamics: stable  Reason for block: post-op pain management and at surgeon's request
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
## 2020-02-21 NOTE — OP NOTE
[address]                  [address] [place],  [facility]                                OPERATIVE REPORT    PATIENT NAME: Rosalba Waldrop                   :        1960  MED REC NO:   [id number]                            ROOM:  ACCOUNT NO:   [de-identified]                           ADMIT DATE: 2020  PROVIDER:     Mesfin Rhodes MD    DATE OF PROCEDURE:  2020    BRIEF HISTORY:  The patient is a very pleasant 61-year-old female who  fell on 2020. She was found to have a right significantly  comminuted dorsally displaced distal radius fracture. It was reduced,  although due to dorsal comminution collapsed significantly between her  time in the emergency department and her following up in the office. She is right-hand dominant, therefore, the decision was made to go  forward with operative fixation of the right distal radius. I went over  the risks with her including the death from anesthesia, possible  infection, possible neurovascular damage, possibility of wound healing  issues, need for further operations, hypersensitivity of thenar  eminence, pain over the ulnar side of the wrist, etc.  She understood  this and decided to go forward with the procedure. PREOPERATIVE DIAGNOSIS:  Right intraarticular distal radius fracture. POSTOPERATIVE DIAGNOSIS:  Right intraarticular distal radius fracture. PROCEDURE:  Open reduction and internal fixation of right distal radius  fracture, intraarticular, two fragments of joint. SURGEON:  Mesfin Rhodes MD    ASSISTANT:  Allan Mejía DO, resident. ESTIMATED BLOOD LOSS:  Less than 25 mL. FLUIDS:  Crystalloid. ANTIBIOTICS:  The patient was given 2 gm of Ancef IV preoperatively. COMPLICATIONS:  There were no complications. PACKS AND DRAINS:  No packs or drains. SPECIMENS:  No specimen sent.     ANESTHESIA:  Regional block with general.    PROCEDURE NOTE:  The with electrocautery. This was then closed with 2-0 Monocryl  and 3-0 nylon. Sterile dressing was put into position _____ short volar  splint. She was taken to PACU in a stable condition. POSTOPERATIVE PLAN:  Includes, no lifting, pushing, or pulling. Keep  the site clean, dry, and intact. Follow up in my office in two weeks  for suture removal.  At this point in time, she will be transitioned to  Velcro wrist splint and then starting occupational therapy.         Luis Eduardo Escobar MD    D: 02/20/2020 11:55:18       T: 02/20/2020 12:51:57     [email or web address]  Job#: [id number]     Doc#: [id number]    CC:

## 2020-02-23 NOTE — ANESTHESIA POSTPROCEDURE EVALUATION
Department of Anesthesiology  Postprocedure Note    Patient: Arline Millard  MRN: [id number]  YOB: 1960  Date of evaluation: 2/23/2020  Time:  7:36 AM     Procedure Summary     Date:  02/20/20 Room / Location:  [facility] OR  / [facility]    Anesthesia Start:  1030 Anesthesia Stop:  9972    Procedure:  RIGHT DISTAL RADIUS  OPEN REDUCTION INTERNAL FIXATION--SYNTHES (Right Wrist) Diagnosis:  (RT DISTAL RADIUS FX)    Surgeon:  Nika Patterson MD Responsible Provider:  Emerson Ponce DO    Anesthesia Type:  general, regional ASA Status:  3          Anesthesia Type: general, regional    Camden Phase I: Camden Score: 9    Camden Phase II: Camden Score: 10    Last vitals: Reviewed and per EMR flowsheets.        Anesthesia Post Evaluation    Patient location during evaluation: PACU  Patient participation: complete - patient participated  Level of consciousness: awake and alert  Pain score: 1  Airway patency: patent  Nausea & Vomiting: no nausea and no vomiting  Complications: no  Cardiovascular status: hemodynamically stable  Respiratory status: acceptable  Hydration status: euvolemic

## 2020-03-02 ENCOUNTER — HOSPITAL ENCOUNTER (OUTPATIENT)
Dept: GENERAL RADIOLOGY | Age: 60
Discharge: HOME | End: 2020-03-04

## 2020-03-02 ENCOUNTER — HOSPITAL ENCOUNTER (OUTPATIENT)
Age: 60
Discharge: HOME | End: 2020-03-04

## 2020-03-02 ENCOUNTER — OFFICE VISIT (OUTPATIENT)
Dept: NEUROSURGERY | Age: 60
End: 2020-03-02

## 2020-03-02 VITALS
WEIGHT: 223.6 LBS | DIASTOLIC BLOOD PRESSURE: 75 MMHG | BODY MASS INDEX: 38.17 KG/M2 | SYSTOLIC BLOOD PRESSURE: 124 MMHG | HEIGHT: 64 IN | HEART RATE: 104 BPM

## 2020-03-02 PROCEDURE — 99213 OFFICE O/P EST LOW 20 MIN: CPT | Performed by: PHYSICIAN ASSISTANT

## 2020-03-02 PROCEDURE — 72100 X-RAY EXAM L-S SPINE 2/3 VWS: CPT

## 2020-03-02 PROCEDURE — 72070 X-RAY EXAM THORAC SPINE 2VWS: CPT

## 2020-03-02 RX ORDER — CYCLOBENZAPRINE HCL 10 MG
10 TABLET ORAL 3 TIMES DAILY PRN
Qty: 30 TABLET | Refills: 0 | Status: SHIPPED | OUTPATIENT
Start: 2020-03-02 | End: 2020-03-12

## 2020-03-06 ENCOUNTER — OFFICE VISIT (OUTPATIENT)
Dept: ORTHOPEDIC SURGERY | Age: 60
End: 2020-03-06

## 2020-03-06 ENCOUNTER — HOSPITAL ENCOUNTER (OUTPATIENT)
Dept: GENERAL RADIOLOGY | Age: 60
Discharge: HOME | End: 2020-03-08

## 2020-03-06 VITALS
BODY MASS INDEX: 39.27 KG/M2 | WEIGHT: 230 LBS | HEIGHT: 64 IN | DIASTOLIC BLOOD PRESSURE: 81 MMHG | SYSTOLIC BLOOD PRESSURE: 145 MMHG | HEART RATE: 82 BPM

## 2020-03-06 PROCEDURE — 73110 X-RAY EXAM OF WRIST: CPT

## 2020-03-06 PROCEDURE — 99212 OFFICE O/P EST SF 10 MIN: CPT | Performed by: ORTHOPAEDIC SURGERY

## 2020-03-06 PROCEDURE — 99024 POSTOP FOLLOW-UP VISIT: CPT | Performed by: ORTHOPAEDIC SURGERY

## 2020-03-06 PROCEDURE — L3809 WHFO W/O JOINTS PRE OTS: HCPCS | Performed by: ORTHOPAEDIC SURGERY
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
## 2020-03-06 NOTE — PATIENT INSTRUCTIONS
Velcro Removable splint to be worn when up and moving. May remove when resting and sleeping  Come out of splint 3 times a day for gentle Range of motion of wrist, hand, and fingers.   Continue to move fingers while in splint  Non weight bearing to right upper extremity  Follow up in 4 weeks with Dr. Kalani Jean with repeat xrays  Over the counter medication for pain control

## 2020-03-26 ENCOUNTER — TELEPHONE (OUTPATIENT)
Dept: ORTHOPEDIC SURGERY | Age: 60
End: 2020-03-26

## 2020-03-26 NOTE — TELEPHONE ENCOUNTER
Called patient to reschedule appointment for  4/7/2020 due to COVID-19 virus. Will call back to reschedule appointment when restrictions are lifted.

## 2020-10-16 ENCOUNTER — OFFICE VISIT (OUTPATIENT)
Dept: NEUROSURGERY | Age: 60
End: 2020-10-16

## 2020-10-16 ENCOUNTER — HOSPITAL ENCOUNTER (OUTPATIENT)
Dept: GENERAL RADIOLOGY | Age: 60
Discharge: HOME | End: 2020-10-18

## 2020-10-16 ENCOUNTER — HOSPITAL ENCOUNTER (OUTPATIENT)
Age: 60
Discharge: HOME | End: 2020-10-18

## 2020-10-16 VITALS
HEART RATE: 83 BPM | HEIGHT: 64 IN | DIASTOLIC BLOOD PRESSURE: 80 MMHG | TEMPERATURE: 97.2 F | WEIGHT: 230 LBS | SYSTOLIC BLOOD PRESSURE: 120 MMHG | BODY MASS INDEX: 39.27 KG/M2

## 2020-10-16 PROCEDURE — 99213 OFFICE O/P EST LOW 20 MIN: CPT | Performed by: PHYSICIAN ASSISTANT

## 2020-10-16 PROCEDURE — 72100 X-RAY EXAM L-S SPINE 2/3 VWS: CPT

## 2020-10-16 RX ORDER — GABAPENTIN 300 MG/1
CAPSULE ORAL
COMMUNITY
Start: 2020-08-25 | End: 2022-01-21

## 2020-10-16 NOTE — PROGRESS NOTES
Post Operative Follow-up     This is a 64year old female who presents to the office for a one year follow-up s/p L4-S1 PLIF     Subjective: Patient states she has been doing well. Nano Dural back in March and was sore, but pain has resolved. Denies new complaints. Lumbar x-rays reviewed.      Physical Exam:              WDWN, no apparent distress              Non-labored breathing               Vitals Stable              Alert and oriented x3              CN 3-12 intact              PERRL              EOMI              CAAL well              Motor strength symmetric              Sensation to LT intact bilaterally   Incision healed well with no signs of infection                   Imaging: 10/16/20 lumbar x-rays stable.      Assessment: This is a 64 y.o.  female presenting for a one year follow-up s/p L4-S1 PLIF. Stable     Plan:  -Follow-up in neurosurgery clinic PRN  -Call or return to neurosurgery office sooner if symptoms worsen or if new issues arise in the interim.     Electronically signed by Laura Alan PA-C on 10/16/2020 at 11:02 AM

## 2020-11-03 PROBLEM — M47.816 LUMBAR SPONDYLOSIS: Status: RESOLVED | Noted: 2019-07-26 | Resolved: 2020-11-03

## 2022-01-13 ENCOUNTER — OFFICE VISIT (OUTPATIENT)
Dept: NEUROSURGERY | Age: 62
End: 2022-01-13
Payer: COMMERCIAL

## 2022-01-13 VITALS
TEMPERATURE: 98.5 F | WEIGHT: 205 LBS | HEIGHT: 64 IN | BODY MASS INDEX: 35 KG/M2 | DIASTOLIC BLOOD PRESSURE: 89 MMHG | SYSTOLIC BLOOD PRESSURE: 141 MMHG | HEART RATE: 84 BPM | OXYGEN SATURATION: 97 %

## 2022-01-13 DIAGNOSIS — G89.29 CHRONIC MIDLINE LOW BACK PAIN WITH RIGHT-SIDED SCIATICA: Primary | ICD-10-CM

## 2022-01-13 DIAGNOSIS — M54.41 CHRONIC MIDLINE LOW BACK PAIN WITH RIGHT-SIDED SCIATICA: Primary | ICD-10-CM

## 2022-01-13 PROCEDURE — G8417 CALC BMI ABV UP PARAM F/U: HCPCS

## 2022-01-13 PROCEDURE — 1036F TOBACCO NON-USER: CPT

## 2022-01-13 PROCEDURE — G8427 DOCREV CUR MEDS BY ELIG CLIN: HCPCS

## 2022-01-13 PROCEDURE — G8484 FLU IMMUNIZE NO ADMIN: HCPCS

## 2022-01-13 PROCEDURE — 3017F COLORECTAL CA SCREEN DOC REV: CPT

## 2022-01-13 PROCEDURE — 99203 OFFICE O/P NEW LOW 30 MIN: CPT

## 2022-01-13 RX ORDER — DULOXETIN HYDROCHLORIDE 60 MG/1
CAPSULE, DELAYED RELEASE ORAL
COMMUNITY
Start: 2022-01-06 | End: 2022-05-10

## 2022-01-13 ASSESSMENT — ENCOUNTER SYMPTOMS
BACK PAIN: 0
TROUBLE SWALLOWING: 0
DIARRHEA: 1
SHORTNESS OF BREATH: 0
VOMITING: 0
ABDOMINAL DISTENTION: 0
NAUSEA: 0

## 2022-01-13 NOTE — PROGRESS NOTES
Subjective:      Patient ID: Feliz Peters is a 63 y.o. female. Pt seen in neurosurgery clinic for evaluation and treatment of back pain. Pt is s/p PLIF x 2 years ago. She states that her back pain initially got better after her surgery but she had a fall and states that her pain became worse after that. She states the pain is located in the lower back, in the right hip and right leg. She states that lying down makes the pain better. She states that sitting and standing makes the pain worse. She describes the pain as constant and sharp. She states the pain goes down to her right hip and right leg. She denies n/w/t in her leg at this time. She currently rates her pain 9/10. She denies recent loss of b/b control, saddle anesthesia, but states she has had some gait instability. She states she hasn't been to PT or pain management since before her surgery. She denies being on any blood thinning medications. She denies tobacco usage. Review of Systems   Constitutional: Negative for fever. HENT: Negative for trouble swallowing. Eyes: Negative for visual disturbance. Respiratory: Negative for shortness of breath. Cardiovascular: Negative for chest pain. Gastrointestinal: Positive for diarrhea. Negative for abdominal distention, nausea and vomiting. Endocrine: Negative for polyuria. Genitourinary: Negative for dysuria. Musculoskeletal: Negative for back pain and neck pain. Skin: Negative for rash. Neurological: Negative for facial asymmetry and numbness. Psychiatric/Behavioral: Negative for confusion. Objective:   Physical Exam  Constitutional:       Appearance: Normal appearance. HENT:      Head: Normocephalic and atraumatic. Eyes:      Extraocular Movements: Extraocular movements intact. Conjunctiva/sclera: Conjunctivae normal.      Pupils: Pupils are equal, round, and reactive to light. Cardiovascular:      Rate and Rhythm: Normal rate.    Pulmonary:      Effort: Pulmonary effort is normal.      Breath sounds: Normal breath sounds. Abdominal:      General: Abdomen is flat. There is no distension. Palpations: Abdomen is soft. Musculoskeletal:         General: Normal range of motion. Cervical back: Normal range of motion and neck supple. Skin:     General: Skin is warm and dry. Neurological:      Mental Status: She is alert. Comments: Alert and oriented x3  CN 3-12 intact  Motor strength full  Sensation intact to LT  Reflexes normal   Psychiatric:         Mood and Affect: Mood normal.         Thought Content: Thought content normal.         Assessment:      62 y/o female s/p PLIF and having new onset low back, hip, and right leg pain. No recent PT or pain management. No recent imaging. Plan:      -CT myelyogram of lumbar spine  -Referral to PT and pain management  -Call/follow up when imaging is completed or conservative measures fail.          Salena Hernandez

## 2022-01-21 ENCOUNTER — OFFICE VISIT (OUTPATIENT)
Dept: PAIN MANAGEMENT | Age: 62
End: 2022-01-21
Payer: COMMERCIAL

## 2022-01-21 VITALS
TEMPERATURE: 95.8 F | OXYGEN SATURATION: 96 % | HEART RATE: 90 BPM | HEIGHT: 64 IN | DIASTOLIC BLOOD PRESSURE: 90 MMHG | RESPIRATION RATE: 16 BRPM | SYSTOLIC BLOOD PRESSURE: 140 MMHG | BODY MASS INDEX: 35 KG/M2 | WEIGHT: 205 LBS

## 2022-01-21 DIAGNOSIS — M96.1 POSTLAMINECTOMY SYNDROME, LUMBAR: ICD-10-CM

## 2022-01-21 DIAGNOSIS — G89.4 CHRONIC PAIN SYNDROME: ICD-10-CM

## 2022-01-21 DIAGNOSIS — M54.16 LUMBAR RADICULOPATHY: ICD-10-CM

## 2022-01-21 DIAGNOSIS — M47.816 LUMBAR FACET ARTHROPATHY: ICD-10-CM

## 2022-01-21 DIAGNOSIS — M47.816 LUMBAR SPONDYLOSIS: Primary | ICD-10-CM

## 2022-01-21 DIAGNOSIS — M51.9 LUMBAR DISC DISORDER: ICD-10-CM

## 2022-01-21 PROCEDURE — G8484 FLU IMMUNIZE NO ADMIN: HCPCS | Performed by: PAIN MEDICINE

## 2022-01-21 PROCEDURE — 99214 OFFICE O/P EST MOD 30 MIN: CPT | Performed by: PAIN MEDICINE

## 2022-01-21 PROCEDURE — 99204 OFFICE O/P NEW MOD 45 MIN: CPT

## 2022-01-21 PROCEDURE — G8427 DOCREV CUR MEDS BY ELIG CLIN: HCPCS | Performed by: PAIN MEDICINE

## 2022-01-21 PROCEDURE — G8417 CALC BMI ABV UP PARAM F/U: HCPCS | Performed by: PAIN MEDICINE

## 2022-01-21 PROCEDURE — 3017F COLORECTAL CA SCREEN DOC REV: CPT | Performed by: PAIN MEDICINE

## 2022-01-21 PROCEDURE — 1036F TOBACCO NON-USER: CPT | Performed by: PAIN MEDICINE

## 2022-01-21 NOTE — PROGRESS NOTES
[address] [facility], [address] [place] [address] Star  [phone]    Follow up Note      Saint Rubenstein     Date of Visit:  1/21/2022    CC:  Patient presents for follow up   Chief Complaint   Patient presents with    New Patient    Consultation    Back Pain     lower back and down right leg to just below the knee     HPI:    Office extension for worsening low back pain with radiation to the right lower extremity along the right L5 dermatome. Patient was last seen 08/2019 and had underwent L4-S1 PLIF  Pain is described as sharp with no tingling or numbness. Appropriate analgesia with current medications regimen:N/A.    Medication side effects:not applicable  Recent diagnostic testing;none  Recent interventional procedures:None    Patient has not been on anticoagulation medications to include none. The patient  has not been on herbal supplements. The patient is diabetic. Imaging:  Lumbar spine MRI 07/2019  ALERT:  THIS IS AN ABNORMAL REPORT   1. Multilevel degenerative disc disease and facet osteoarthropathy   T10-S1, worse at the L4-L5 and the L5-S1 motion segment levels. Moderately severe thecal sac stenosis at L4-L5 with suspected   underlying crowding/compression of cauda equina nerve roots, with   abutment/impingement exiting right L4 spinal nerve root at L4-L5   motion segment level and abutment/impingement of exiting bilateral L5   spinal nerve roots at L5-S1 motion segment level. See above for level   by level details.          Previous treatments: Physical Therapy, Epidural Steroid Injection and medications. .        Potential Aberrant Drug-Related Behavior:  No    Urine Drug Screening:  None no opioids started     OARRS report:  01/2022 consistent     Opioid agreement:  Date started:  Renewal date:    Past Medical History:   Diagnosis Date    Arthritis     Depression     DM (diabetes mellitus) ([address]     Hypertension     IBS (irritable bowel syndrome)  Sleep apnea     DOES NOT WEAR MASK     Past Surgical History:   Procedure Laterality Date    COLONOSCOPY      EPIDURAL STEROID INJECTION N/A 8/22/2019    LUMBAR EPIDURAL STEROID INJECTION L3-4 WITH LOW VOLUME performed by Kaiser Burroughs MD at [address] N/A 10/3/2019    L4-L5 , L5-S1 POSTERIOR INTERBODY FUSION --OARM, AUDIOLOGY, CAGES, PLATES, SCREWS, NIMISHA, NIRAV TABLE, CELL SAVER, PLATELET GEL, GLOBUS performed by Alison Man MD at [facility] Right 2/20/2020    RIGHT DISTAL RADIUS  OPEN REDUCTION INTERNAL FIXATION--SYNTHES performed by Marcus Lugo MD at [address]     Prior to Admission medications    Medication Sig Start Date End Date Taking? Authorizing Provider   DULoxetine (CYMBALTA) 60 MG extended release capsule TAKE ONE CAPSULE BY MOUTH EVERY DAY 1/6/22  Yes Historical Provider, MD   levothyroxine (SYNTHROID) 25 MCG tablet TAKE ONE TABLET BY MOUTH EVERY DAY 7/9/19  Yes Historical Provider, MD   vitamin D (CHOLECALCIFEROL) 1000 UNIT TABS tablet Take 1,000 Units by mouth daily   Yes Historical Provider, MD   metFORMIN (GLUCOPHAGE) 1000 MG tablet Take 1,000 mg by mouth 2 times daily (with meals)   Yes Historical Provider, MD   dicyclomine (BENTYL) 10 MG capsule Take 10 mg by mouth 2 times daily    Yes Historical Provider, MD   omeprazole (PRILOSEC) 20 MG capsule Take 20 mg by mouth daily. Yes Historical Provider, MD   multivitamin [facility]) per tablet Take 1 tablet by mouth daily. Yes Historical Provider, MD   Ascorbic Acid (VITAMIN C) 500 MG tablet Take 500 mg by mouth daily.    Yes Historical Provider, MD     No Known Allergies    Social History     Socioeconomic History    Marital status:      Spouse name: Not on file    Number of children: Not on file    Years of education: Not on file    Highest education level: Not on file   Occupational History    Not on file   Tobacco Use    Smoking status: Never Smoker    Smokeless tobacco: Never Used   Vaping Use    Vaping Use: Never used   Substance and Sexual Activity    Alcohol use: No    Drug use: Yes     Types: Marijuana Don Safer)     Comment: three weeks ago    Sexual activity: Yes     Partners: Male   Other Topics Concern    Not on file   Social History Narrative    Not on file     Social Determinants of Health     Financial Resource Strain:     Difficulty of Paying Living Expenses: Not on file   Food Insecurity:     Worried About Running Out of Food in the Last Year: Not on file    Feliberto of Food in the Last Year: Not on file   Transportation Needs:     Lack of Transportation (Medical): Not on file    Lack of Transportation (Non-Medical):  Not on file   Physical Activity:     Days of Exercise per Week: Not on file    Minutes of Exercise per Session: Not on file   Stress:     Feeling of Stress : Not on file   Social Connections:     Frequency of Communication with Friends and Family: Not on file    Frequency of Social Gatherings with Friends and Family: Not on file    Attends Restorationist Services: Not on file    Active Member of [address] [place] [address] or Organizations: Not on file    Attends Club or Organization Meetings: Not on file    Marital Status: Not on file   Intimate Partner Violence:     Fear of Current or Ex-Partner: Not on file    Emotionally Abused: Not on file    Physically Abused: Not on file    Sexually Abused: Not on file   Housing Stability:     Unable to Pay for Housing in the Last Year: Not on file    Number of [place] in the Last Year: Not on file    Unstable Housing in the Last Year: Not on file       Family History   Problem Relation Age of Onset    Arthritis Other     Cancer Other     Depression Other     Diabetes Other     Heart Disease Other     Hypertension Other     Diabetes Mother     High Blood Pressure Mother     Cancer Father      REVIEW OF SYSTEMS:     Loren Espana denies fever/chills, chest pain, shortness of breath, new bowel or bladder complaints. All other review of systems was negative. PHYSICAL EXAMINATION:      BP (!) 140/90   Pulse 90   Temp 95.8 °F (35.4 °C) (Infrared)   Resp 16   Ht 5' 4\" (1.626 m)   Wt 205 lb (93 kg)   SpO2 96%   BMI 35.19 kg/m²     General:       General appearance:   pleasant and well-hydrated. , in mild discomfort and A & O x3  Build:Overweight     HEENT:     Head:normocephalic and atraumatic  Pupils:regular, round and equal.  Sclera: icterus absent,      Lungs:     Breathing:Breathing Pattern: regular, no distress     Abdomen:     Shape:non-distended and normal  Tenderness:none     Lumbar spine:     Spine inspection:scoliosis   CVA tenderness:No   Palpation:tenderness paravertebral muscles, facet loading, left, right, positive and tenderness.   Range of motion:abnormal moderately Lateral bending, flexion, extension rotation bilateral and is  painful.     Musculoskeletal:     Trigger points in Paraveteral:absent bilaterally   SI joint tenderness:negative right, negative left              GIOVANA test:negative right, negative             left  Piriformis tenderness:negative right, negative left  Trochanteric bursa tenderness:negative right, negative left  SLR:negative right, negative left, sitting      Extremities:     Tremors:None bilaterally upper and lower  Range of motion:pain with internal rotation of hips negative but limited right hip  Intact:Yes  Edema:Normal     Neurological:     Sensory:normal to light touch bilateral lower extremities  Motor:                       Right Quadriceps5/5          Left Quadriceps5/5           Right Gastrocnemius5/5    Left Gastrocnemius5/5  Right Ant Tibialis5/5  Left Ant Tibialis5/5  Reflexes:    Right Quadriceps reflex2+  Left Quadriceps reflex2+  Right Achilles reflex2+  Left Achilles reflex2+  Gait:antalgic     Dermatology:     Skin:no unusual rashes and no skin lesions     Impression:  Low back pain with radiation to the right

## 2022-01-21 NOTE — PROGRESS NOTES
Do you currently have any of the following:    Fever: No  Headache:  No  Cough: No  Shortness of breath: No  Exposed to anyone with these symptoms: No                                                                                                                Larissa Perez presents to the [address] [address] on 1/21/2022. Supa Hester is complaining of pain lower back down right leg to just below the knee. The pain is constant. The pain is described as stabbing, sharp and tender. Pain is rated on her best day at a 7, on her worst day at a 10, and on average at a 8 on the VAS scale. She took her last dose of motrin, cymbalta, marijuana . Supa Hester does not have issues with constipation. Any procedures since your last visit: No,. She is not on NSAIDS and  is not on anticoagulation medications to include none      Pacemaker or defibrillator: No.    Medication Contract and Consent for Opioid Use Documents Filed     Patient Documents     Type of Document Status Date Received Received By Description    Medication Contract Received 10/16/2019  9:52 AM IDALMIS ACOSTA Opioid medication contract with Dr Sima Peacock                   BP (!) 140/90   Pulse 90   Temp 95.8 °F (35.4 °C) (Infrared)   Resp 16   Ht 5' 4\" (1.626 m)   Wt 205 lb (93 kg)   SpO2 96%   BMI 35.19 kg/m²      No LMP recorded. Patient has had an ablation.

## 2022-02-10 ENCOUNTER — HOSPITAL ENCOUNTER (OUTPATIENT)
Age: 62
Setting detail: OUTPATIENT SURGERY
Discharge: HOME | End: 2022-02-10
Attending: PAIN MEDICINE | Admitting: PAIN MEDICINE
Payer: COMMERCIAL

## 2022-02-10 VITALS
WEIGHT: 205 LBS | OXYGEN SATURATION: 98 % | RESPIRATION RATE: 16 BRPM | SYSTOLIC BLOOD PRESSURE: 147 MMHG | BODY MASS INDEX: 35 KG/M2 | HEART RATE: 98 BPM | DIASTOLIC BLOOD PRESSURE: 95 MMHG | HEIGHT: 64 IN

## 2022-02-10 DIAGNOSIS — M48.061 NEUROFORAMINAL STENOSIS OF LUMBAR SPINE: ICD-10-CM

## 2022-02-10 PROCEDURE — 7100000011 HC PHASE II RECOVERY - ADDTL 15 MIN: Performed by: PAIN MEDICINE

## 2022-02-10 PROCEDURE — 3600000005 HC SURGERY LEVEL 5 BASE: Performed by: PAIN MEDICINE

## 2022-02-10 PROCEDURE — 7100000010 HC PHASE II RECOVERY - FIRST 15 MIN: Performed by: PAIN MEDICINE

## 2022-02-10 PROCEDURE — 2709999900 HC NON-CHARGEABLE SUPPLY: Performed by: PAIN MEDICINE

## 2022-02-10 PROCEDURE — 2500000003 HC RX 250 WO HCPCS: Performed by: PAIN MEDICINE

## 2022-02-10 PROCEDURE — 6360000004 HC RX CONTRAST MEDICATION: Performed by: PAIN MEDICINE

## 2022-02-10 PROCEDURE — 62323 NJX INTERLAMINAR LMBR/SAC: CPT | Performed by: PAIN MEDICINE

## 2022-02-10 PROCEDURE — 6360000002 HC RX W HCPCS: Performed by: PAIN MEDICINE

## 2022-02-10 PROCEDURE — 3600000015 HC SURGERY LEVEL 5 ADDTL 15MIN: Performed by: PAIN MEDICINE

## 2022-02-10 RX ORDER — LIDOCAINE HYDROCHLORIDE 5 MG/ML
INJECTION, SOLUTION INFILTRATION; INTRAVENOUS PRN
Status: DISCONTINUED | OUTPATIENT
Start: 2022-02-10 | End: 2022-02-10 | Stop reason: ALTCHOICE

## 2022-02-10 ASSESSMENT — PAIN SCALES - GENERAL: PAINLEVEL_OUTOF10: 0

## 2022-02-10 ASSESSMENT — PAIN DESCRIPTION - LOCATION: LOCATION: BACK

## 2022-02-10 ASSESSMENT — PAIN - FUNCTIONAL ASSESSMENT: PAIN_FUNCTIONAL_ASSESSMENT: 0-10

## 2022-02-10 NOTE — H&P
[facility]  [address] [facility], [address] [place] [address] Star  [phone]    Procedure History & Physical      Adán Arambula     HPI:    Patient  is here for low back and leg pain for caudal LIN  Labs/imaging studies reviewed   All question and concerns addressed including R/B/A associated with the procedure    Past Medical History:   Diagnosis Date    Arthritis     Depression     DM (diabetes mellitus) ([address]     Hypertension     IBS (irritable bowel syndrome)     Sleep apnea     DOES NOT WEAR MASK       Past Surgical History:   Procedure Laterality Date    COLONOSCOPY      EPIDURAL STEROID INJECTION N/A 8/22/2019    LUMBAR EPIDURAL STEROID INJECTION L3-4 WITH LOW VOLUME performed by Yuni Shell MD at [facility] N/A 10/3/2019    L4-L5 , L5-S1 POSTERIOR INTERBODY FUSION --OARM, AUDIOLOGY, CAGES, PLATES, SCREWS, NIMISHA, NIRAV TABLE, CELL SAVER, PLATELET GEL, GLOBUS performed by Messi Allison MD at [facility] Right 2/20/2020    RIGHT DISTAL RADIUS  OPEN REDUCTION INTERNAL FIXATION--SYNTHES performed by Anais Guan MD at [address]       Prior to Admission medications    Medication Sig Start Date End Date Taking? Authorizing Provider   DULoxetine (CYMBALTA) 60 MG extended release capsule TAKE ONE CAPSULE BY MOUTH EVERY DAY 1/6/22  Yes Historical Provider, MD   vitamin D (CHOLECALCIFEROL) 1000 UNIT TABS tablet Take 1,000 Units by mouth daily   Yes Historical Provider, MD   metFORMIN (GLUCOPHAGE) 1000 MG tablet Take 1,000 mg by mouth 2 times daily (with meals)   Yes Historical Provider, MD   dicyclomine (BENTYL) 10 MG capsule Take 10 mg by mouth 2 times daily    Yes Historical Provider, MD   omeprazole (PRILOSEC) 20 MG capsule Take 20 mg by mouth daily. Yes Historical Provider, MD   multivitamin [facility]) per tablet Take 1 tablet by mouth daily.    Yes Historical Provider, MD Ascorbic Acid (VITAMIN C) 500 MG tablet Take 500 mg by mouth daily. Yes Historical Provider, MD       No Known Allergies    Social History     Socioeconomic History    Marital status:      Spouse name: Not on file    Number of children: Not on file    Years of education: Not on file    Highest education level: Not on file   Occupational History    Not on file   Tobacco Use    Smoking status: Never Smoker    Smokeless tobacco: Never Used   Vaping Use    Vaping Use: Never used   Substance and Sexual Activity    Alcohol use: No    Drug use: Yes     Types: Marijuana (Weed)     Comment: three weeks ago    Sexual activity: Yes     Partners: Male   Other Topics Concern    Not on file   Social History Narrative    Not on file     Social Determinants of Health     Financial Resource Strain:     Difficulty of Paying Living Expenses: Not on file   Food Insecurity:     Worried About Running Out of Food in the Last Year: Not on file    Feliberto of Food in the Last Year: Not on file   Transportation Needs:     Lack of Transportation (Medical): Not on file    Lack of Transportation (Non-Medical):  Not on file   Physical Activity:     Days of Exercise per Week: Not on file    Minutes of Exercise per Session: Not on file   Stress:     Feeling of Stress : Not on file   Social Connections:     Frequency of Communication with Friends and Family: Not on file    Frequency of Social Gatherings with Friends and Family: Not on file    Attends Christianity Services: Not on file    Active Member of Clubs or Organizations: Not on file    Attends Club or Organization Meetings: Not on file    Marital Status: Not on file   Intimate Partner Violence:     Fear of Current or Ex-Partner: Not on file    Emotionally Abused: Not on file    Physically Abused: Not on file    Sexually Abused: Not on file   Housing Stability:     Unable to Pay for Housing in the Last Year: Not on file    Number of [place] in the Last Year: Not on file    Unstable Housing in the Last Year: Not on file       Family History   Problem Relation Age of Onset    Arthritis Other     Cancer Other     Depression Other     Diabetes Other     Heart Disease Other     Hypertension Other     Diabetes Mother     High Blood Pressure Mother     Cancer Father          REVIEW OF SYSTEMS:    CONSTITUTIONAL:  negative for  fevers, chills, sweats and fatigue    RESPIRATORY:  negative for  dry cough, cough with sputum, dyspnea, wheezing and chest pain    CARDIOVASCULAR:  negative for chest pain, dyspnea, palpitations, syncope    GASTROINTESTINAL:  negative for nausea, vomiting, change in bowel habits, diarrhea, constipation and abdominal pain    MUSCULOSKELETAL: negative for muscle weakness    SKIN: negative for itching or rashes. BEHAVIOR/PSYCH:  negative for poor appetite, increased appetite, decreased sleep and poor concentration    All other systems negative      PHYSICAL EXAM:    VITALS:  BP (!) 125/93   Pulse 110   Resp 20   Ht 5' 4\" (1.626 m)   Wt 205 lb (93 kg)   SpO2 98%   BMI 35.19 kg/m²     CONSTITUTIONAL:  awake, alert, cooperative, no apparent distress, and appears stated age    EYES: PERRLA, EOMI    LUNGS:  No increased work of breathing, no audible wheezing    CARDIOVASCULAR:  regular rate and rhythm    ABDOMEN:  Soft non tender non distended     EXTREMITIES: no signs of clubbing or cyanosis. MUSCULOSKELETAL: negative for flaccid muscle tone or spastic movements. SKIN: gross examination reveals no signs of rashes, or diaphoresis. NEURO: Cranial nerves II-XII grossly intact. No signs of agitated mood. Assessment/Plan:    Low back and leg pain for caudal LIN.

## 2022-02-10 NOTE — OP NOTE
2/10/2022    Patient: Viktoria Eagle  :  1960  Age:  63 y.o. Sex:  female     PRE-OPERATIVE DIAGNOSIS: Displacement of lumbar intervertebral disc, Lumbar DDD, Spinal stenosis. POST-OPERATIVE DIAGNOSIS: Same. PROCEDURE PERFORMED:  #1 Therapeutic Caudal Epidural done under fluoroscopic guidance. SURGEON:   ALESSANDRO Desouza M.D. ANESTHESIA: Local    ESTIMATED BLOOD LOSS: None. BRIEF HISTORY: Viktoria Eagle comes in today for the first  therapeutic caudal epidural steroid injection under fluorsoscopic guidance. After discussing the potential risks and benefits of the procedure with the patient  Margarita Berumen did request that we proceed. A complete History & Physical was reviewed and it is unchanged. DESCRIPTION OF PROCEDURE:    After confirming written and informed consent, a time-out was performed and Ruths name and date of birth, the procedure to be performed as well as the plan for the location of the needle insertion were confirmed. Patient was brought into the procedure room and was placed in the prone position on a fluoroscopy table. Standard monitors were placed and vital signs were observed throughout the procedure  The lumbosacral and caudal area were prepped with chloraprep and draped in a sterile manner. The sacral hiatus was identified and marked under AP fluoroscopy. A sterile gauze was placed in the midgluteal cleft for increased sterility. The overlying skin and subcutaneous tissues were anesthetized with 0.5% Lidocaine. Under fluoroscopic guidance, a # 22 gauge 3.5 inch spinal needle was advanced into the sacral hiatus . After the needle passed through the sacrococcygeal ligament, the needle angle was advanced minimally. There was no evidence of paresthesia throughout the needle placement. After negative aspiration for blood and CSF was confirmed, 1 cc of Omnipaque was injected to confirm proper epidural spread using both Ap(live fluoroscopy) and lateral views.  A solution consisting of 0.5% Lidocaine and 80 mg DepoMedrol total of 9 cc was easily injected . There was a clear outline of the epidural space and visualization of the sacral roots. The needle was then removed and a sterile Band-Aid was applied to the puncture site. Disposition the patient tolerated the procedure well and there were no complications . Vital signs remained stable throughout the procedure. The patient was escorted to the recovery area where they remained until discharge and written discharge instructions for the procedure were given. Plan: Jitendra Rubalcava will return to our pain management center as scheduled.      Colletta Brightly, MD

## 2022-02-16 ENCOUNTER — OFFICE VISIT (OUTPATIENT)
Dept: PAIN MANAGEMENT | Age: 62
End: 2022-02-16
Payer: COMMERCIAL

## 2022-02-16 VITALS
HEIGHT: 64 IN | TEMPERATURE: 96.9 F | RESPIRATION RATE: 16 BRPM | BODY MASS INDEX: 35 KG/M2 | DIASTOLIC BLOOD PRESSURE: 100 MMHG | OXYGEN SATURATION: 96 % | SYSTOLIC BLOOD PRESSURE: 152 MMHG | HEART RATE: 98 BPM | WEIGHT: 205 LBS

## 2022-02-16 DIAGNOSIS — M47.816 LUMBAR FACET ARTHROPATHY: ICD-10-CM

## 2022-02-16 DIAGNOSIS — M54.16 LUMBAR RADICULOPATHY: Primary | ICD-10-CM

## 2022-02-16 DIAGNOSIS — M51.9 LUMBAR DISC DISORDER: ICD-10-CM

## 2022-02-16 DIAGNOSIS — M47.816 LUMBAR SPONDYLOSIS: ICD-10-CM

## 2022-02-16 DIAGNOSIS — G89.4 CHRONIC PAIN SYNDROME: ICD-10-CM

## 2022-02-16 DIAGNOSIS — M96.1 POSTLAMINECTOMY SYNDROME, LUMBAR: ICD-10-CM

## 2022-02-16 PROCEDURE — G8417 CALC BMI ABV UP PARAM F/U: HCPCS | Performed by: PAIN MEDICINE

## 2022-02-16 PROCEDURE — 3017F COLORECTAL CA SCREEN DOC REV: CPT | Performed by: PAIN MEDICINE

## 2022-02-16 PROCEDURE — 1036F TOBACCO NON-USER: CPT | Performed by: PAIN MEDICINE

## 2022-02-16 PROCEDURE — G8484 FLU IMMUNIZE NO ADMIN: HCPCS | Performed by: PAIN MEDICINE

## 2022-02-16 PROCEDURE — 99213 OFFICE O/P EST LOW 20 MIN: CPT | Performed by: PAIN MEDICINE

## 2022-02-16 PROCEDURE — G8427 DOCREV CUR MEDS BY ELIG CLIN: HCPCS | Performed by: PAIN MEDICINE

## 2022-02-16 NOTE — PROGRESS NOTES
[address] [facility], [address] [place] [address] Star  [phone]    Follow up Note      Nora Colorado     Date of Visit:  2/16/2022    CC:  Patient presents for follow up   Chief Complaint   Patient presents with    Follow Up After Procedure      #1 Therapeutic Caudal Epidural done under fluoroscopic guidance. HPI:    Pain is unchanged. Appropriate analgesia with current medications regimen: Fair    Change in quality of symptoms:no. Medication side effects:none. Recent diagnostic testing:none. Recent interventional procedures:Caudal LIN . poor    Patient has not been on anticoagulation medications to include none. The patient  has not been on herbal supplements. The patient is diabetic. Imaging:  Lumbar spine MRI 07/2019  ALERT:  THIS IS AN ABNORMAL REPORT   1. Multilevel degenerative disc disease and facet osteoarthropathy   T10-S1, worse at the L4-L5 and the L5-S1 motion segment levels. Moderately severe thecal sac stenosis at L4-L5 with suspected   underlying crowding/compression of cauda equina nerve roots, with   abutment/impingement exiting right L4 spinal nerve root at L4-L5   motion segment level and abutment/impingement of exiting bilateral L5   spinal nerve roots at L5-S1 motion segment level. See above for level   by level details.         Previous treatments: Physical Therapy, Epidural Steroid Injection and medications. .     Potential Aberrant Drug-Related Behavior:  No    Urine Drug Screening:  None no opioids started     OARRS report:  02/2022 consistent     Opioid Agreement:  Date enacted:N/A  Renewal date:N/A    Past Medical History:   Diagnosis Date    Arthritis     Depression     DM [facility]diabetes mellitus) ([facility] [address]     Hypertension     IBS (irritable bowel syndrome)     Sleep apnea     DOES NOT WEAR MASK     Past Surgical History:   Procedure Laterality Date    COLONOSCOPY      EPIDURAL STEROID INJECTION N/A 8/22/2019    LUMBAR EPIDURAL No    Drug use: Yes     Types: Marijuana Saray Arenas     Comment: three weeks ago    Sexual activity: Yes     Partners: Male   Other Topics Concern    Not on file   Social History Narrative    Not on file     Social Determinants of Health     Financial Resource Strain:     Difficulty of Paying Living Expenses: Not on file   Food Insecurity:     Worried About Running Out of Food in the Last Year: Not on file    Feliberto of Food in the Last Year: Not on file   Transportation Needs:     Lack of Transportation (Medical): Not on file    Lack of Transportation (Non-Medical): Not on file   Physical Activity:     Days of Exercise per Week: Not on file    Minutes of Exercise per Session: Not on file   Stress:     Feeling of Stress : Not on file   Social Connections:     Frequency of Communication with Friends and Family: Not on file    Frequency of Social Gatherings with Friends and Family: Not on file    Attends Anabaptist Services: Not on file    Active Member of [address] [place] [address] or Organizations: Not on file    Attends Club or Organization Meetings: Not on file    Marital Status: Not on file   Intimate Partner Violence:     Fear of Current or Ex-Partner: Not on file    Emotionally Abused: Not on file    Physically Abused: Not on file    Sexually Abused: Not on file   Housing Stability:     Unable to Pay for Housing in the Last Year: Not on file    Number of [place] in the Last Year: Not on file    Unstable Housing in the Last Year: Not on file     Family History   Problem Relation Age of Onset    Arthritis Other     Cancer Other     Depression Other     Diabetes Other     Heart Disease Other     Hypertension Other     Diabetes Mother     High Blood Pressure Mother     Cancer Father      REVIEW OF SYSTEMS:     Sherita Ren denies fever/chills, chest pain, shortness of breath, new bowel or bladder complaints. All other review of systems was negative.     PHYSICAL EXAMINATION:      BP (!) 152/100   Pulse 98 Temp 96.9 °F (36.1 °C) (Infrared)   Resp 16   Ht 5' 4\" (1.626 m)   Wt 205 lb (93 kg)   SpO2 96%   BMI 35.19 kg/m²     General:       General appearance:   pleasant and well-hydrated. , in moderate discomfort and A & O x3  Build:Overweight     HEENT:     Head:normocephalic and atraumatic  Sclera: icterus absent,      Lungs:     Breathing:Breathing Pattern: regular, no distress     Abdomen:     Shape:non-distended and normal  Tenderness:none     Lumbar spine:     Spine inspection:scoliosis   CVA tenderness:No   Palpation:tenderness paravertebral muscles, facet loading, left, right, positive and tenderness. Range of motion:not tested     Musculoskeletal:     Trigger points in Paraveteral:absent bilaterally   SI joint tenderness:negative right, negative left  SLR:negative right, negative left, sitting      Extremities:     Tremors:None bilaterally upper and lower  Range of motion:pain with internal rotation of hips negative but limited right hip  Intact:Yes  Edema:Normal     Neurological:     Sensory:normal to light touch bilateral lower extremities  Motor:                       Right Quadriceps5/5          Left Quadriceps5/5           Right Gastrocnemius5/5    Left Gastrocnemius5/5  Right Ant Tibialis5/5  Left Ant Tibialis5/5  Reflexes:    Right Quadriceps reflex2+  Left Quadriceps reflex2+  Right Achilles reflex2+  Left Achilles reflex2+  Gait:antalgic     Dermatology:     Skin:no unusual rashes and no skin lesions     Impression:  Low back pain with radiation to the right thigh along the Right L4 dermatome  Lumbar spine MRI moderately large circumferential disc bulge with foraminal narrowing L4-5 and L5-S1   Patient had seen neurosurgery and surgery was recommended. Patient had requested to try LESI one more time before surgery  Plan:  Follow up on her low back pain with radiation to the right lower extremity with no acute issues.   Patient is s/p caudal LIN with less than expected response, discussed repeating using interlaminar approach at L3-4(patient had done before with good outcome that lasted more than 6 month), patient agrees. Awaiting lumbar spine CT myelogram.  Continue with OTC pain medications   Currently off Gabapentin 600 mg TID  OARRS report reviewed 02/2022. Patient encouraged to stay active and to lose weight. Treatment plan discussed with the patient including medications and procedure side effects     We discussed with the patient that combining opioids, benzodiazepines, alcohol, illicit drugs or sleep aids increases the risk of respiratory depression including death. We discussed that these medications may cause drowsiness, sedation or dizziness and have counseled the patient not to drive or operate machinery. We have discussed that these medications will be prescribed only by one provider. We have discussed with the patient about age related risk factors and have thoroughly discussed the importance of taking these medications as prescribed. The patient verbalizes understanding. antony Dunbar M.D.

## 2022-02-16 NOTE — PROGRESS NOTES
Do you currently have any of the following:    Fever: No  Headache:  No  Cough: No  Shortness of breath: No  Exposed to anyone with these symptoms: No                                                                                                                Kevin George presents to the [facility] on 2/16/2022. Andrez Alvarez is complaining of pain in her lower back. . The pain is intermittent. The pain is described as aching, throbbing, shooting, stabbing and sharp. Pain is rated on her best day at a 3 on her worst day at a 9, and on average at a 5 on the VAS scale. She took her last dose of aleve . Andrez Alvarez does not have issues with constipation. Any procedures since your last visit: Yes, with 10 % relief, it just took the edge off of it. She is  on NSAIDS and  is not on anticoagulation medications to include none and is managed by NA. Pacemaker or defibrillator: No Physician managing device is NA. Medication Contract and Consent for Opioid Use Documents Filed     Patient Documents     Type of Document Status Date Received Received By Description    Medication Contract Received 10/16/2019  9:52 AM IDALMIS ACOSTA Opioid medication contract with Dr Jose Morocho                   BP (!) 152/100   Pulse 98   Temp 96.9 °F (36.1 °C) (Infrared)   Resp 16   Ht 5' 4\" (1.626 m)   Wt 205 lb (93 kg)   SpO2 96%   BMI 35.19 kg/m²      No LMP recorded. Patient has had an ablation.

## 2022-02-25 ENCOUNTER — TELEPHONE (OUTPATIENT)
Dept: NEUROSURGERY | Age: 62
End: 2022-02-25

## 2022-02-25 ENCOUNTER — TELEPHONE (OUTPATIENT)
Dept: PAIN MANAGEMENT | Age: 62
End: 2022-02-25

## 2022-02-25 NOTE — TELEPHONE ENCOUNTER
CT/MYELOGRAM  Lumbar   Date: 03/04/2022  Facility: [address] [place] [address] Time: 8 AM    NPO after Midnight  Bring   Will be at facility [employer] ID, insurance cards, covid vaccine card and list of current medications. ASA/Ibuprofen/Blood Thinners to be held for 7days prior  Antidepressants hold for 3 days prior    NO CYMBALTA NO HOLDS    Labs: PT/INR, Platelets  Covid test 5 days prior if not vaccinated      Pt informed of appt date, time, location and instructions. Pt acknowledged.   Electronically signed by Mike Mendez MA on 2/25/22 at 2:54 PM EST

## 2022-02-25 NOTE — TELEPHONE ENCOUNTER
2-25-22-call to Rubi Galvan to advise her the insurance approval is not back yet for her scheduled procedure of 2-28-22. Will need to postpone her procedure, left voice mail message.     Raymundo Walters RN  Pain Management

## 2022-02-28 ENCOUNTER — TELEPHONE (OUTPATIENT)
Dept: PAIN MANAGEMENT | Age: 62
End: 2022-02-28

## 2022-02-28 NOTE — TELEPHONE ENCOUNTER
2-28-22-Ruth called in stating she is having her Ct Scan myelogram on Friday 3-4-22.     Carolina Chacon RN  Pain Management

## 2022-02-28 NOTE — TELEPHONE ENCOUNTER
Call to Kerri Worthy that procedure was approved for 3/10/2022 and that the surgery center should call her a few days before for the pre op call and after 3:00 PM the business day before with the arrival time. Instructed Jitendra Rubalcava to hold ibuprofen for 24 hours, naprosyn for 4 days and any aspirin containing products or fish oil for 7 days. Instructed to call office back if any questions. Jitendra Rubalcava verbalized understanding.

## 2022-03-01 NOTE — TELEPHONE ENCOUNTER
Patient called asking to clarify meds. She stated she actually is taking   Duloxetine/Cymbalta and I instructed her to hold that   She is also on metformin and I instructed to hold that as well.      Electronically signed by Sheila Dejesus MA on 3/1/22 at 11:17 AM EST

## 2022-03-03 ENCOUNTER — HOSPITAL ENCOUNTER (OUTPATIENT)
Dept: PREADMISSION TESTING | Age: 62
Discharge: HOME | End: 2022-03-03

## 2022-03-03 RX ORDER — BENAZEPRIL HYDROCHLORIDE 10 MG/1
10 TABLET ORAL DAILY
COMMUNITY

## 2022-03-03 RX ORDER — ATORVASTATIN CALCIUM 10 MG/1
10 TABLET, FILM COATED ORAL DAILY
COMMUNITY

## 2022-03-03 RX ORDER — SODIUM CHLORIDE 0.9 % (FLUSH) 0.9 %
5-40 SYRINGE (ML) INJECTION 2 TIMES DAILY
Status: CANCELLED | OUTPATIENT
Start: 2022-03-03

## 2022-03-03 NOTE — PROGRESS NOTES
[address] [facility]                                                                                                                    PRE OP INSTRUCTIONS FOR  Moiz Snyder        Date: 3/3/2022    Date of surgery: 3/4/22   Arrival Time: Hospital will call you between 5pm and 7pm with your final arrival time for surgery    1. Do not eat or drink anything after midnight prior to surgery. This includes no water, chewing gum, mints or ice chips. 2. Take the following medications with a small sip of water on the morning of Surgery: all AM meds except metformin and cymbalta. 3. Diabetics may take evening dose of insulin but none after midnight. If you feel symptomatic or low blood sugar morning of surgery drink 1-2 ounces of apple juice only. 4. Aspirin, Ibuprofen, Advil, Naproxen, Vitamin E and other Anti-inflammatory products should be stopped  before surgery  as directed by your physician. Take Tylenol only unless instructed otherwise by your surgeon. 5. Check with your Doctor regarding stopping Plavix, Coumadin, Lovenox, Eliquis, Effient, or other blood thinners. 6. Do not smoke,use illicit drugs and do not drink any alcoholic beverages 24 hours prior to surgery. 7. You may brush your teeth the morning of surgery. DO NOT SWALLOW WATER    8. You MUST make arrangements for a responsible adult to take you home after your surgery. You will not be allowed to leave alone or drive yourself home. It is strongly suggested someone stay with you the first 24 hrs. Your surgery will be cancelled if you do not have a ride home. 9. PEDIATRIC PATIENTS ONLY:  A parent/legal guardian must accompany a child scheduled for surgery and plan to stay at the hospital until the child is discharged. Please do not bring other children with you.     10. Please wear simple, loose fitting clothing to the hospital.  Do not bring valuables (money, credit cards, checkbooks, etc.) Do not wear any makeup (including no eye makeup) or nail polish on your fingers or toes. 11. DO NOT wear any jewelry or piercings on day of surgery. All body piercing jewelry must be removed. 12. Shower the night before surgery with __x_Antibacterial soap /JANENE WIPES________    13. TOTAL JOINT REPLACEMENT/HYSTERECTOMY PATIENTS ONLY---Remember to bring Blood Bank bracelet to the hospital on the day of surgery. 14. If you have a Living Will and Durable Power of  for Healthcare, please bring in a copy. 15. If appropriate bring crutches, inspirex, WALKER, CANE etc... 12. Notify your Surgeon if you develop any illness between now and surgery time, cough, cold, fever, sore throat, nausea, vomiting, etc.  Please notify your surgeon if you experience dizziness, shortness of breath or blurred vision between now & the time of your surgery. 17. If you have ___dentures, they will be removed before going to the OR; we will provide you a container. If you wear ___contact lenses or ___glasses, they will be removed; please bring a case for them. 18. To provide excellent care visitors will be limited to 1 in the room at any given time. 19. Please bring picture ID and insurance card. 20. Sleep apnea patients need to bring CPAP AND SETTINGS to hospital on day of surgery. 21. During flu season no children under the age of 15 are permitted in the hospital for the safety of all patients. 22. Other please check in at the information desk. Please call AMBULATORY CARE if you have any further questions.    [address]     [address]

## 2022-03-04 ENCOUNTER — HOSPITAL ENCOUNTER (OUTPATIENT)
Dept: INTERVENTIONAL RADIOLOGY/VASCULAR | Age: 62
Discharge: HOME | End: 2022-03-04
Payer: COMMERCIAL

## 2022-03-04 ENCOUNTER — HOSPITAL ENCOUNTER (OUTPATIENT)
Dept: CT IMAGING | Age: 62
Discharge: HOME | End: 2022-03-04
Payer: COMMERCIAL

## 2022-03-04 VITALS
BODY MASS INDEX: 35 KG/M2 | HEART RATE: 70 BPM | HEIGHT: 64 IN | TEMPERATURE: 97.1 F | SYSTOLIC BLOOD PRESSURE: 148 MMHG | WEIGHT: 205 LBS | OXYGEN SATURATION: 100 % | DIASTOLIC BLOOD PRESSURE: 68 MMHG | RESPIRATION RATE: 20 BRPM

## 2022-03-04 DIAGNOSIS — G89.29 CHRONIC MIDLINE LOW BACK PAIN WITH RIGHT-SIDED SCIATICA: ICD-10-CM

## 2022-03-04 DIAGNOSIS — M54.41 CHRONIC MIDLINE LOW BACK PAIN WITH RIGHT-SIDED SCIATICA: ICD-10-CM

## 2022-03-04 LAB
HCT VFR BLD CALC: 45.1 % (ref 34–48)
HEMOGLOBIN: 14.8 G/DL (ref 11.5–15.5)
INR BLD: 1
METER GLUCOSE: 110 MG/DL (ref 74–99)
PLATELET # BLD: 274 E9/L (ref 130–450)
PROTHROMBIN TIME: 11.3 SEC (ref 9.3–12.4)

## 2022-03-04 PROCEDURE — 7100000010 HC PHASE II RECOVERY - FIRST 15 MIN

## 2022-03-04 PROCEDURE — 72132 CT LUMBAR SPINE W/DYE: CPT

## 2022-03-04 PROCEDURE — 72265 MYELOGRAPHY L-S SPINE: CPT

## 2022-03-04 PROCEDURE — 6360000004 HC RX CONTRAST MEDICATION: Performed by: RADIOLOGY

## 2022-03-04 PROCEDURE — 36415 COLL VENOUS BLD VENIPUNCTURE: CPT

## 2022-03-04 PROCEDURE — 85027 COMPLETE CBC AUTOMATED: CPT

## 2022-03-04 PROCEDURE — 7100000011 HC PHASE II RECOVERY - ADDTL 15 MIN

## 2022-03-04 PROCEDURE — 82962 GLUCOSE BLOOD TEST: CPT

## 2022-03-04 PROCEDURE — 2580000003 HC RX 258: Performed by: RADIOLOGY

## 2022-03-04 PROCEDURE — 85610 PROTHROMBIN TIME: CPT

## 2022-03-04 RX ORDER — METHYLPREDNISOLONE 4 MG/1
TABLET ORAL
COMMUNITY
Start: 2022-02-22 | End: 2022-03-04

## 2022-03-04 RX ORDER — SODIUM CHLORIDE 0.9 % (FLUSH) 0.9 %
5-40 SYRINGE (ML) INJECTION 2 TIMES DAILY
Status: DISCONTINUED | OUTPATIENT
Start: 2022-03-04 | End: 2022-03-05 | Stop reason: HOSPADM

## 2022-03-04 RX ADMIN — IOPAMIDOL 10 ML: 612 INJECTION, SOLUTION INTRATHECAL at 09:06

## 2022-03-04 RX ADMIN — SODIUM CHLORIDE, PRESERVATIVE FREE 10 ML: 5 INJECTION INTRAVENOUS at 08:08

## 2022-03-04 ASSESSMENT — PAIN SCALES - GENERAL
PAINLEVEL_OUTOF10: 0

## 2022-03-04 ASSESSMENT — PAIN - FUNCTIONAL ASSESSMENT: PAIN_FUNCTIONAL_ASSESSMENT: 0-10

## 2022-03-04 NOTE — PROGRESS NOTES
Patient came to specials procedures for lumbar myelogram.    Procedure explained to patient, questions were answered. Patient positioned prone on table, secured and prepped for procedure. Emotional support given. 0857 start procedure /88 70 17 100% on room air prone    0902 End procedure /96 73 16 96% on room air prone    Patient tolerated procedure. bandaid place on back. Patient transferred back on cart supine.     Nurse to nurse called to [facility] spoke with Avelino Loving RN    Patient taken to CT scan

## 2022-03-10 ENCOUNTER — HOSPITAL ENCOUNTER (OUTPATIENT)
Age: 62
Setting detail: OUTPATIENT SURGERY
Discharge: HOME | End: 2022-03-10
Attending: PAIN MEDICINE | Admitting: PAIN MEDICINE
Payer: COMMERCIAL

## 2022-03-10 ENCOUNTER — HOSPITAL ENCOUNTER (OUTPATIENT)
Dept: OPERATING ROOM | Age: 62
Setting detail: OUTPATIENT SURGERY
Discharge: HOME | End: 2022-03-10
Attending: PAIN MEDICINE
Payer: COMMERCIAL

## 2022-03-10 VITALS
SYSTOLIC BLOOD PRESSURE: 122 MMHG | TEMPERATURE: 97.6 F | WEIGHT: 205 LBS | RESPIRATION RATE: 16 BRPM | HEART RATE: 104 BPM | DIASTOLIC BLOOD PRESSURE: 80 MMHG | HEIGHT: 64 IN | OXYGEN SATURATION: 97 % | BODY MASS INDEX: 35 KG/M2

## 2022-03-10 DIAGNOSIS — M54.16 LUMBAR RADICULOPATHY: ICD-10-CM

## 2022-03-10 PROCEDURE — 7100000011 HC PHASE II RECOVERY - ADDTL 15 MIN: Performed by: PAIN MEDICINE

## 2022-03-10 PROCEDURE — 62323 NJX INTERLAMINAR LMBR/SAC: CPT | Performed by: PAIN MEDICINE

## 2022-03-10 PROCEDURE — 3209999900 FLUORO FOR SURGICAL PROCEDURES

## 2022-03-10 PROCEDURE — 7100000010 HC PHASE II RECOVERY - FIRST 15 MIN: Performed by: PAIN MEDICINE

## 2022-03-10 PROCEDURE — 6360000004 HC RX CONTRAST MEDICATION: Performed by: PAIN MEDICINE

## 2022-03-10 PROCEDURE — 6360000002 HC RX W HCPCS: Performed by: PAIN MEDICINE

## 2022-03-10 PROCEDURE — 2500000003 HC RX 250 WO HCPCS: Performed by: PAIN MEDICINE

## 2022-03-10 PROCEDURE — 3600000005 HC SURGERY LEVEL 5 BASE: Performed by: PAIN MEDICINE

## 2022-03-10 PROCEDURE — 2709999900 HC NON-CHARGEABLE SUPPLY: Performed by: PAIN MEDICINE

## 2022-03-10 RX ORDER — LIDOCAINE HYDROCHLORIDE 5 MG/ML
INJECTION, SOLUTION INFILTRATION; INTRAVENOUS PRN
Status: DISCONTINUED | OUTPATIENT
Start: 2022-03-10 | End: 2022-03-10 | Stop reason: ALTCHOICE

## 2022-03-10 ASSESSMENT — PAIN SCALES - GENERAL
PAINLEVEL_OUTOF10: 0
PAINLEVEL_OUTOF10: 0

## 2022-03-10 ASSESSMENT — PAIN - FUNCTIONAL ASSESSMENT: PAIN_FUNCTIONAL_ASSESSMENT: 0-10

## 2022-03-10 ASSESSMENT — PAIN DESCRIPTION - DESCRIPTORS: DESCRIPTORS: ACHING

## 2022-03-10 NOTE — OP NOTE
3/10/2022    Patient: Saint Rubenstein  :  1960  Age:  63 y.o. Sex:  female     PRE-OPERATIVE DIAGNOSIS: Lumbar disc displacement, lumbar radiculopathy. POST-OPERATIVE DIAGNOSIS: Same. PROCEDURE: Fluoroscopic guided therapeutic lumbar epidural steroid injection at the L3-4 level (# 1). SURGEON: ALESSANDRO Martin M.D.. ANESTHESIA: Local    ESTIMATED BLOOD LOSS: None.  ______________________________________________________________________    BRIEF HISTORY:  Saint Rubenstein comes in today for first lumbar epidural injection at L3-4 level. The potential complications of this procedure were discussed with her again today. She has elected to undergo the aforementioned procedure. Keegan complete History & Physical examination were reviewed in depth, a copy of which is in the chart. DESCRIPTION OF PROCEDURE:    After confirming written and informed consent, a time-out was performed and Keegan name and date of birth, the procedure to be performed as well as the plan for the location of the needle insertion were confirmed. The patient was brought into the procedure room and placed in the prone position on the fluoroscopy table. A pillow was placed under the patient's lower abdomen/upper pelvis to increase lumbar interlaminar space. Standard monitors were placed, and vital signs were observed throughout the procedure. The area of the lumbar spine was prepped with chloraprep and draped in a sterile manner. The L3-4 interspace was identified and marked under AP fluoroscopy. The skin and subcutaneous tissues at the above level were anesthestized with 0.5% lidocaine. With intermittent fluoroscopy, an # 18 gauge 3 1/2 tuohy epidural needle was inserted and directed toward the interlaminar space. The needle was slowly advanced using loss of resistance technique and 5 cc glass syringe  until the tip of the epidural needle has passed through the ligamentum flavum and entered the epidural space.  AP and lateral fluoroscopic imaging is performed to verify that the epidural needle is properly placed. Negative aspiration of blood and CSF was confirmed. 0.5 ml of omnipaque 240 was used for confirmation of even epidural spread under both live and AP fluoroscopy. After negative aspiration, a solution of 0.5 % Lidocaine 3 ml and 40 mg DepoMedrol was easily injected. The needle was gently removed intact . The patient back was cleaned and a Band-Aid was placed over the needle insertion point. Disposition the patient tolerated the procedure well and there were no complications . Vital signs remained stable throughout the procedure. The patient was escorted to the recovery area where they remained until discharge and written discharge instructions for the procedure were given. Plan: Segun Vu will return to our pain management center as scheduled.      Celeste Arrieta MD

## 2022-03-10 NOTE — H&P
[address]  [facility], [address] [facility] [place]  [phone]    Procedure History & Physical      Belita Narendra     HPI:    Patient  is here for low back and leg pain for LESI L3-4  Labs/imaging studies reviewed   All question and concerns addressed including R/B/A associated with the procedure    Past Medical History:   Diagnosis Date    Arthritis     Depression     DM (diabetes mellitus) ([address]     Hypertension     IBS (irritable bowel syndrome)     Sleep apnea     doesn't wear mask anymore       Past Surgical History:   Procedure Laterality Date    COLONOSCOPY      EPIDURAL STEROID INJECTION N/A 8/22/2019    LUMBAR EPIDURAL STEROID INJECTION L3-4 WITH LOW VOLUME performed by Kong oCuch MD at [address] [place] [address] N/A 10/3/2019    L4-L5 , L5-S1 POSTERIOR INTERBODY FUSION --OARM, AUDIOLOGY, CAGES, PLATES, SCREWS, NIMISHA, NIRAV TABLE, CELL SAVER, PLATELET GEL, GLOBUS performed by Erna Ocampo MD at [facility] 21 Right 2/10/2022    CAUDAL  EPIDURAL STEROID INJECTION WITH 60 DEPO RIGHT performed by Kong Couch MD at [facility] 2/20/2020    RIGHT DISTAL RADIUS  OPEN REDUCTION INTERNAL FIXATION--SYNTHES performed by Nithin Borja MD at [address] [place] [address]       Prior to Admission medications    Medication Sig Start Date End Date Taking?  Authorizing Provider   atorvastatin (LIPITOR) 10 MG tablet Take 10 mg by mouth daily   Yes Historical Provider, MD   benazepril (LOTENSIN) 10 MG tablet Take 10 mg by mouth daily   Yes Historical Provider, MD   DULoxetine (CYMBALTA) 60 MG extended release capsule TAKE ONE CAPSULE BY MOUTH EVERY DAY 1/6/22  Yes Historical Provider, MD   metFORMIN (GLUCOPHAGE) 1000 MG tablet Take 1,000 mg by mouth 2 times daily (with meals)   Yes Historical Provider, MD   dicyclomine (BENTYL) 10 MG capsule Take 10 mg by mouth 2 times daily    Yes Historical Provider, MD   omeprazole (PRILOSEC) 20 MG capsule Take 20 mg by mouth daily. Yes Historical Provider, MD   multivitamin [facility]) per tablet Take 1 tablet by mouth daily. Yes Historical Provider, MD       No Known Allergies    Social History     Socioeconomic History    Marital status:      Spouse name: Not on file    Number of children: Not on file    Years of education: Not on file    Highest education level: Not on file   Occupational History    Not on file   Tobacco Use    Smoking status: Never Smoker    Smokeless tobacco: Never Used   Vaping Use    Vaping Use: Never used   Substance and Sexual Activity    Alcohol use: No    Drug use: Yes     Types: Marijuana Lanis Juan Jose)     Comment: last smoked 2/21/22    Sexual activity: Yes     Partners: Male   Other Topics Concern    Not on file   Social History Narrative    Not on file     Social Determinants of Health     Financial Resource Strain:     Difficulty of Paying Living Expenses: Not on file   Food Insecurity:     Worried About Running Out of Food in the Last Year: Not on file    Feliberto of Food in the Last Year: Not on file   Transportation Needs:     Lack of Transportation (Medical): Not on file    Lack of Transportation (Non-Medical):  Not on file   Physical Activity:     Days of Exercise per Week: Not on file    Minutes of Exercise per Session: Not on file   Stress:     Feeling of Stress : Not on file   Social Connections:     Frequency of Communication with Friends and Family: Not on file    Frequency of Social Gatherings with Friends and Family: Not on file    Attends Scientologist Services: Not on file    Active Member of Clubs or Organizations: Not on file    Attends Club or Organization Meetings: Not on file    Marital Status: Not on file   Intimate Partner Violence:     Fear of Current or Ex-Partner: Not on file    Emotionally Abused: Not on file    Physically Abused: Not on file    Sexually Abused: Not on file   Housing Stability:     Unable to Pay for Housing in the Last Year: Not on file    Number of Places Lived in the Last Year: Not on file    Unstable Housing in the Last Year: Not on file       Family History   Problem Relation Age of Onset    Arthritis Other     Cancer Other     Depression Other     Diabetes Other     Heart Disease Other     Hypertension Other     Diabetes Mother     High Blood Pressure Mother     Cancer Father          REVIEW OF SYSTEMS:    CONSTITUTIONAL:  negative for  fevers, chills, sweats and fatigue    RESPIRATORY:  negative for  dry cough, cough with sputum, dyspnea, wheezing and chest pain    CARDIOVASCULAR:  negative for chest pain, dyspnea, palpitations, syncope    GASTROINTESTINAL:  negative for nausea, vomiting, change in bowel habits, diarrhea, constipation and abdominal pain    MUSCULOSKELETAL: negative for muscle weakness    SKIN: negative for itching or rashes. BEHAVIOR/PSYCH:  negative for poor appetite, increased appetite, decreased sleep and poor concentration    All other systems negative      PHYSICAL EXAM:    VITALS:  /84   Pulse 115   Temp 97.6 °F (36.4 °C) (Skin)   Resp 18   Ht 5' 4\" (1.626 m)   Wt 205 lb (93 kg)   SpO2 97%   BMI 35.19 kg/m²     CONSTITUTIONAL:  awake, alert, cooperative, no apparent distress, and appears stated age    EYES: PERRLA, EOMI    LUNGS:  No increased work of breathing, no audible wheezing    CARDIOVASCULAR:  regular rate and rhythm    ABDOMEN:  Soft non tender non distended     EXTREMITIES: no signs of clubbing or cyanosis. MUSCULOSKELETAL: negative for flaccid muscle tone or spastic movements. SKIN: gross examination reveals no signs of rashes, or diaphoresis. NEURO: Cranial nerves II-XII grossly intact. No signs of agitated mood. Assessment/Plan:    Low back and leg pain for LESI L3-4.

## 2022-03-16 ENCOUNTER — OFFICE VISIT (OUTPATIENT)
Dept: PAIN MANAGEMENT | Age: 62
End: 2022-03-16
Payer: COMMERCIAL

## 2022-03-16 VITALS
DIASTOLIC BLOOD PRESSURE: 88 MMHG | HEIGHT: 64 IN | HEART RATE: 89 BPM | BODY MASS INDEX: 35 KG/M2 | OXYGEN SATURATION: 98 % | RESPIRATION RATE: 16 BRPM | TEMPERATURE: 96.6 F | WEIGHT: 205 LBS | SYSTOLIC BLOOD PRESSURE: 136 MMHG

## 2022-03-16 DIAGNOSIS — M47.816 LUMBAR SPONDYLOSIS: ICD-10-CM

## 2022-03-16 DIAGNOSIS — G89.4 CHRONIC PAIN SYNDROME: ICD-10-CM

## 2022-03-16 DIAGNOSIS — M54.16 LUMBAR RADICULOPATHY: ICD-10-CM

## 2022-03-16 DIAGNOSIS — M47.816 LUMBAR FACET ARTHROPATHY: ICD-10-CM

## 2022-03-16 DIAGNOSIS — M51.9 LUMBAR DISC DISORDER: Primary | ICD-10-CM

## 2022-03-16 PROCEDURE — G8484 FLU IMMUNIZE NO ADMIN: HCPCS | Performed by: PAIN MEDICINE

## 2022-03-16 PROCEDURE — G8427 DOCREV CUR MEDS BY ELIG CLIN: HCPCS | Performed by: PAIN MEDICINE

## 2022-03-16 PROCEDURE — 1036F TOBACCO NON-USER: CPT | Performed by: PAIN MEDICINE

## 2022-03-16 PROCEDURE — 99213 OFFICE O/P EST LOW 20 MIN: CPT | Performed by: PAIN MEDICINE

## 2022-03-16 PROCEDURE — G8417 CALC BMI ABV UP PARAM F/U: HCPCS | Performed by: PAIN MEDICINE

## 2022-03-16 PROCEDURE — 3017F COLORECTAL CA SCREEN DOC REV: CPT | Performed by: PAIN MEDICINE

## 2022-03-16 NOTE — PROGRESS NOTES
Do you currently have any of the following:    Fever: No  Headache:  No  Cough: No  Shortness of breath: No  Exposed to anyone with these symptoms: No                                                                                                                Cornell Segura presents to the [facility] on 3/16/2022. Lynsey Newsome is complaining of pain lower back. The pain is constant. The pain is described as throbbing. Pain is rated on her best day at a 4, on her worst day at a 9, and on average at a 5 on the VAS scale. She took her last dose of nothing . Lynsey Newsome does not have issues with constipation. Any procedures since your last visit: Yes, with 60 % relief. She is not on NSAIDS and  is not on anticoagulation medications to include none. Pacemaker or defibrillator: No.    Medication Contract and Consent for Opioid Use Documents Filed     Patient Documents     Type of Document Status Date Received Received By Description    Medication Contract Received 10/16/2019  9:52 AM IDALMIS ACOSTA Opioid medication contract with Dr Ga Jaimes                   /88   Pulse 89   Temp 96.6 °F (35.9 °C) (Infrared)   Resp 16   Ht 5' 4\" (1.626 m)   Wt 205 lb (93 kg)   SpO2 98%   BMI 35.19 kg/m²      No LMP recorded. Patient has had an ablation.

## 2022-03-16 NOTE — PROGRESS NOTES
[facility], [address] [place] [address] [place]  [phone]    Follow up Note      Nora Colorado     Date of Visit:  3/16/2022    CC:  Patient presents for follow up   Chief Complaint   Patient presents with    Follow-up    Back Pain     lower    Follow Up After Procedure     : Fluoroscopic guided therapeutic lumbar epidural steroid injection at the L3-4 level (# 1). HPI:    Pain is better  Appropriate analgesia with current medications regimen:N/A  Change in quality of symptoms:no. Medication side effects:none. Recent diagnostic testing:none. Recent interventional procedures:LESI L3-4 good outcome in her pain    Patient has not been on anticoagulation medications to include none. The patient  has not been on herbal supplements. The patient is diabetic. Imaging:  Lumbar spine MRI 07/2019  ALERT:  THIS IS AN ABNORMAL REPORT   1. Multilevel degenerative disc disease and facet osteoarthropathy   T10-S1, worse at the L4-L5 and the L5-S1 motion segment levels. Moderately severe thecal sac stenosis at L4-L5 with suspected   underlying crowding/compression of cauda equina nerve roots, with   abutment/impingement exiting right L4 spinal nerve root at L4-L5   motion segment level and abutment/impingement of exiting bilateral L5   spinal nerve roots at L5-S1 motion segment level. See above for level   by level details.         Previous treatments: Physical Therapy, Epidural Steroid Injection and medications. .     Potential Aberrant Drug-Related Behavior:  No    Urine Drug Screening:  None no opioids started     OARRS report:  03/2022 consistent     Opioid Agreement:  Renewal date:N/A    Past Medical History:   Diagnosis Date    Arthritis     Depression     DM (diabetes mellitus) ([facility] [address]     Hypertension     IBS (irritable bowel syndrome)     Sleep apnea     doesn't wear mask anymore     Past Surgical History:   Procedure Laterality Date    COLONOSCOPY  EPIDURAL STEROID INJECTION N/A 8/22/2019    LUMBAR EPIDURAL STEROID INJECTION L3-4 WITH LOW VOLUME performed by Rina Salvador MD at [facility] N/A 10/3/2019    L4-L5 , L5-S1 POSTERIOR INTERBODY FUSION --OARM, AUDIOLOGY, CAGES, PLATES, SCREWS, NIMISHA, NIRAV TABLE, CELL SAVER, PLATELET GEL, GLOBUS performed by Steve Parish MD at [facility] Right 2/10/2022    CAUDAL  EPIDURAL STEROID INJECTION WITH 60 DEPO RIGHT performed by Rina Salvador MD at [address] [place] [address] [address] N/A 3/10/2022    Lumbar epidural steroid injection L3-4 with 80 depo performed by Rina Salvador MD at [facility] 2/20/2020    RIGHT DISTAL RADIUS  OPEN REDUCTION INTERNAL FIXATION--SYNTHES performed by Alicia Peacock MD at [address]     Prior to Admission medications    Medication Sig Start Date End Date Taking? Authorizing Provider   atorvastatin (LIPITOR) 10 MG tablet Take 10 mg by mouth daily   Yes Historical Provider, MD   benazepril (LOTENSIN) 10 MG tablet Take 10 mg by mouth daily   Yes Historical Provider, MD   DULoxetine (CYMBALTA) 60 MG extended release capsule TAKE ONE CAPSULE BY MOUTH EVERY DAY 1/6/22  Yes Historical Provider, MD   metFORMIN (GLUCOPHAGE) 1000 MG tablet Take 1,000 mg by mouth 2 times daily (with meals)   Yes Historical Provider, MD   dicyclomine (BENTYL) 10 MG capsule Take 10 mg by mouth 2 times daily    Yes Historical Provider, MD   omeprazole (PRILOSEC) 20 MG capsule Take 20 mg by mouth daily. Yes Historical Provider, MD   multivitamin [facility]) per tablet Take 1 tablet by mouth daily.    Yes Historical Provider, MD     No Known Allergies    Social History     Socioeconomic History    Marital status:      Spouse name: Not on file    Number of children: Not on file    Years of education: Not on file    Highest education level: Not on file   Occupational History  Not on file   Tobacco Use    Smoking status: Never Smoker    Smokeless tobacco: Never Used   Vaping Use    Vaping Use: Never used   Substance and Sexual Activity    Alcohol use: No    Drug use: Yes     Types: Marijuana Thurl Cipro)     Comment: last smoked 2/21/22    Sexual activity: Yes     Partners: Male   Other Topics Concern    Not on file   Social History Narrative    Not on file     Social Determinants of Health     Financial Resource Strain:     Difficulty of Paying Living Expenses: Not on file   Food Insecurity:     Worried About Running Out of Food in the Last Year: Not on file    Feliberto of Food in the Last Year: Not on file   Transportation Needs:     Lack of Transportation (Medical): Not on file    Lack of Transportation (Non-Medical):  Not on file   Physical Activity:     Days of Exercise per Week: Not on file    Minutes of Exercise per Session: Not on file   Stress:     Feeling of Stress : Not on file   Social Connections:     Frequency of Communication with Friends and Family: Not on file    Frequency of Social Gatherings with Friends and Family: Not on file    Attends Nondenominational Services: Not on file    Active Member of [address] [place] [address] or Organizations: Not on file    Attends Club or Organization Meetings: Not on file    Marital Status: Not on file   Intimate Partner Violence:     Fear of Current or Ex-Partner: Not on file    Emotionally Abused: Not on file    Physically Abused: Not on file    Sexually Abused: Not on file   Housing Stability:     Unable to Pay for Housing in the Last Year: Not on file    Number of [place] in the Last Year: Not on file    Unstable Housing in the Last Year: Not on file     Family History   Problem Relation Age of Onset    Arthritis Other     Cancer Other     Depression Other     Diabetes Other     Heart Disease Other     Hypertension Other     Diabetes Mother     High Blood Pressure Mother     Cancer Father      REVIEW OF SYSTEMS: Michelle Quinteros denies fever/chills, chest pain, shortness of breath, new bowel or bladder complaints. All other review of systems was negative. PHYSICAL EXAMINATION:      /88   Pulse 89   Temp 96.6 °F (35.9 °C) (Infrared)   Resp 16   Ht 5' 4\" (1.626 m)   Wt 205 lb (93 kg)   SpO2 98%   BMI 35.19 kg/m²     General:       General appearance:   pleasant and well-hydrated. , in mild discomfort and A & O x3  Build:Overweight     HEENT:     Head:normocephalic and atraumatic  Sclera: icterus absent,      Lungs:     Breathing:Breathing Pattern: regular, no distress     Abdomen:     Shape:non-distended and normal  Tenderness:none     Lumbar spine:     Spine inspection:scoliosis   CVA tenderness:No   Palpation:tenderness paravertebral muscles, facet loading, left, right, positive and tenderness. Range of motion:not tested     Musculoskeletal:     Trigger points in Paraveteral:absent bilaterally   SI joint tenderness:negative right, negative left  SLR:negative right, negative left, sitting      Extremities:     Tremors:None bilaterally upper and lower  Range of motion:pain with internal rotation of hips negative but limited right hip  Intact:Yes  Edema:Normal     Neurological:     Sensory:normal to light touch bilateral lower extremities  Motor:                       Right Quadriceps5/5          Left Quadriceps5/5           Right Gastrocnemius5/5    Left Gastrocnemius5/5  Right Ant Tibialis5/5  Left Ant Tibialis5/5     Dermatology:     Skin:no unusual rashes and no skin lesions     Impression:  Low back pain with radiation to the right thigh along the Right L4 dermatome  Lumbar spine MRI moderately large circumferential disc bulge with foraminal narrowing L4-5 and L5-S1   Patient had seen neurosurgery and surgery was recommended. Patient had requested to try LESI one more time before surgery  Plan:  Follow up on her low back pain with radiation to the right lower extremity with no acute issues.   Patient is s/p LESI L3-4 with good outcome, will repeat as needed. Reviewed lumbar CT myelogram and discussed with the patient. Continue with OTC pain medications. OARRS report reviewed 03/2022. Patient encouraged to stay active and to lose weight. Treatment plan discussed with the patient including medications side effects     We discussed with the patient that combining opioids, benzodiazepines, alcohol, illicit drugs or sleep aids increases the risk of respiratory depression including death. We discussed that these medications may cause drowsiness, sedation or dizziness and have counseled the patient not to drive or operate machinery. We have discussed that these medications will be prescribed only by one provider. We have discussed with the patient about age related risk factors and have thoroughly discussed the importance of taking these medications as prescribed. The patient verbalizes understanding. antony Izquierdo M.D.

## 2022-03-23 ENCOUNTER — OFFICE VISIT (OUTPATIENT)
Dept: NEUROSURGERY | Age: 62
End: 2022-03-23
Payer: COMMERCIAL

## 2022-03-23 VITALS
OXYGEN SATURATION: 97 % | TEMPERATURE: 98.2 F | DIASTOLIC BLOOD PRESSURE: 87 MMHG | BODY MASS INDEX: 35 KG/M2 | SYSTOLIC BLOOD PRESSURE: 132 MMHG | HEART RATE: 86 BPM | WEIGHT: 205 LBS | HEIGHT: 64 IN | RESPIRATION RATE: 16 BRPM

## 2022-03-23 DIAGNOSIS — M48.062 SPINAL STENOSIS OF LUMBAR REGION WITH NEUROGENIC CLAUDICATION: Primary | ICD-10-CM

## 2022-03-23 DIAGNOSIS — Z98.1 S/P LUMBAR FUSION: ICD-10-CM

## 2022-03-23 DIAGNOSIS — M51.36 LUMBAR ADJACENT SEGMENT DISEASE WITH SPONDYLOLISTHESIS: ICD-10-CM

## 2022-03-23 DIAGNOSIS — M43.16 LUMBAR ADJACENT SEGMENT DISEASE WITH SPONDYLOLISTHESIS: ICD-10-CM

## 2022-03-23 PROCEDURE — 1036F TOBACCO NON-USER: CPT | Performed by: PHYSICIAN ASSISTANT

## 2022-03-23 PROCEDURE — 3017F COLORECTAL CA SCREEN DOC REV: CPT | Performed by: PHYSICIAN ASSISTANT

## 2022-03-23 PROCEDURE — G8484 FLU IMMUNIZE NO ADMIN: HCPCS | Performed by: PHYSICIAN ASSISTANT

## 2022-03-23 PROCEDURE — G8417 CALC BMI ABV UP PARAM F/U: HCPCS | Performed by: PHYSICIAN ASSISTANT

## 2022-03-23 PROCEDURE — G8427 DOCREV CUR MEDS BY ELIG CLIN: HCPCS | Performed by: PHYSICIAN ASSISTANT

## 2022-03-23 PROCEDURE — 99213 OFFICE O/P EST LOW 20 MIN: CPT | Performed by: PHYSICIAN ASSISTANT

## 2022-03-23 NOTE — PROGRESS NOTES
Office Follow-Up     Subjective: Kaiden Hernandez is a 63year old female who is known to our services. She previously underwent L4-S1 PLIF in 2019 by Dr. Chandni Bella. Pt had stable post operative follow up. Patient presented to the office 1/13/22 c/o love back pain and right leg pain. She states that her back pain initially got better after her surgery but she had a fall and states that her pain became worse after that. She states the pain is located in the lower back, in the right hip and right leg. She states that lying down makes the pain better. She states that sitting and standing makes the pain worse. She describes the pain as constant and sharp. She states the pain goes down to her right hip and right leg. She denies n/w/t in her leg at this time. She currently rates her pain 9/10. CT lumbar myelogram was ordered at that time. She presents to the office today to discuss imaging and further treatment plan. Pt is seeing pain management for lumbar LIN. Injections help for a short period of time, then the pain returns. Denies loss of bowel or bladder, saddle anesthesia, fever, chills, N/V, SOB, or chest pain. Physical Exam:              WDWN, no apparent distress              Non-labored breathing               Vitals Stable              Alert and oriented x3              CN 3-12 intact              PERRL              EOMI              CAAL well              Motor strength symmetric              Sensation to LT intact bilaterally                  Imaging: 3/4/22 lumbar CT myelogram:  Impression   1. Degenerative change.  Postsurgical changes including bilateral laminectomy   and posterior fusion at L4 through S1.  No evidence of recurrent central   canal stenosis or disc herniation at these levels. 2. Moderate central canal stenosis at L3-4.  Right lateral L3-4 disc   protrusion causing severe right neural foraminal stenosis.  This could   compress the right L3 nerve root.    3. Tiny right posterolateral disc protrusion at L1-2, felt to be of doubtful   significance. Assessment: This is a 63 y.o.  female presenting for a follow-up for back pain and to review imaging. Plan:  -Discussed myelogram in detail. 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 She can continue conservative treatments if helping at this time. If not helping, make an appointment with Dr. Konnie Oppenheim to discuss surgical intervention  -OARRS report reviewed   -Call or return to neurosurgery office sooner if symptoms worsen or if new issues arise in the interim.     Electronically signed by Thanh Myers PA-C on 3/23/2022 at 1:21 PM

## 2022-04-07 ENCOUNTER — OFFICE VISIT (OUTPATIENT)
Dept: NEUROSURGERY | Age: 62
End: 2022-04-07
Payer: COMMERCIAL

## 2022-04-07 VITALS
OXYGEN SATURATION: 99 % | WEIGHT: 205 LBS | RESPIRATION RATE: 18 BRPM | DIASTOLIC BLOOD PRESSURE: 87 MMHG | SYSTOLIC BLOOD PRESSURE: 132 MMHG | HEIGHT: 64 IN | BODY MASS INDEX: 35 KG/M2 | HEART RATE: 86 BPM | TEMPERATURE: 98.2 F

## 2022-04-07 DIAGNOSIS — M54.41 ACUTE MIDLINE LOW BACK PAIN WITH BILATERAL SCIATICA: Primary | ICD-10-CM

## 2022-04-07 DIAGNOSIS — M54.42 ACUTE MIDLINE LOW BACK PAIN WITH BILATERAL SCIATICA: Primary | ICD-10-CM

## 2022-04-07 PROCEDURE — G8427 DOCREV CUR MEDS BY ELIG CLIN: HCPCS | Performed by: NEUROLOGICAL SURGERY

## 2022-04-07 PROCEDURE — 1036F TOBACCO NON-USER: CPT | Performed by: NEUROLOGICAL SURGERY

## 2022-04-07 PROCEDURE — 99213 OFFICE O/P EST LOW 20 MIN: CPT | Performed by: NEUROLOGICAL SURGERY

## 2022-04-07 PROCEDURE — G8417 CALC BMI ABV UP PARAM F/U: HCPCS | Performed by: NEUROLOGICAL SURGERY

## 2022-04-07 PROCEDURE — 3017F COLORECTAL CA SCREEN DOC REV: CPT | Performed by: NEUROLOGICAL SURGERY

## 2022-04-07 NOTE — PROGRESS NOTES
Patient is here for follow up consult for: back and leg pain    Physical exam  Alert and Oriented X3  PERRLA, EOMI  CAAL 5/5 except 4/5 in RLE  Sensation intact to LT and PP  Reflexes are 2+ and symmetric    A/P: patient is here for follow up for: back and bilateral leg pain. She has failed over 3 months of epidurals and physical therapy with medications. Her myelogram shows solid fusion from L4-S1 with adjacent segment stenosis at L3-L4.   I am recommending exploration of her L4-S1 fusion and L3-L4 posterior lumbar interbody fusion    David Corley MD

## 2022-04-20 ENCOUNTER — PREP FOR PROCEDURE (OUTPATIENT)
Dept: NEUROSURGERY | Age: 62
End: 2022-04-20

## 2022-04-20 DIAGNOSIS — Z01.818 PRE-OP TESTING: Primary | ICD-10-CM

## 2022-04-20 RX ORDER — SODIUM CHLORIDE 0.9 % (FLUSH) 0.9 %
5-40 SYRINGE (ML) INJECTION EVERY 12 HOURS SCHEDULED
Status: CANCELLED | OUTPATIENT
Start: 2022-04-20

## 2022-04-20 RX ORDER — SODIUM CHLORIDE 9 MG/ML
INJECTION, SOLUTION INTRAVENOUS PRN
Status: CANCELLED | OUTPATIENT
Start: 2022-04-20

## 2022-04-20 RX ORDER — SODIUM CHLORIDE 9 MG/ML
INJECTION, SOLUTION INTRAVENOUS CONTINUOUS
Status: CANCELLED | OUTPATIENT
Start: 2022-04-20

## 2022-04-20 RX ORDER — SODIUM CHLORIDE 0.9 % (FLUSH) 0.9 %
5-40 SYRINGE (ML) INJECTION PRN
Status: CANCELLED | OUTPATIENT
Start: 2022-04-20

## 2022-05-09 DIAGNOSIS — M48.061 SPINAL STENOSIS OF LUMBAR REGION, UNSPECIFIED WHETHER NEUROGENIC CLAUDICATION PRESENT: Primary | ICD-10-CM

## 2022-05-10 RX ORDER — DULOXETIN HYDROCHLORIDE 60 MG/1
60 CAPSULE, DELAYED RELEASE ORAL DAILY
COMMUNITY

## 2022-05-10 NOTE — PROGRESS NOTES
[address]   PRE-ADMISSION TESTING GENERAL INSTRUCTIONS  PAT Phone Number: [phone]      GENERAL INSTRUCTIONS:    [x] Antibacterial Soap Shower Night Surgery  [x] Ben ([facility]) wipe instruction sheet and wipes given. [x] Do not wear makeup, lotions, powders, deodorant. [x] Nothing by mouth after midnight, including gum, candy, mints, or water. [x] You may brush your teeth, gargle, but do NOT swallow water. [x] No tobacco products, illegal drugs, or alcohol within 24 hours of your surgery. [x] Jewelry or valuables should not be brought to the hospital. All body and/or tongue piercing's must be removed prior to arriving to hospital. ALL hair pins must be removed. [x] Bring insurance card and photo ID. [x] Transfusion Bracelet: Please bring with you to hospital, day of surgery     PARKING INSTRUCTIONS:     [x] ARRIVAL TIME: 5/19/22 @ 5:00am   · [x] Enter into the [employer]. Two people may accompany you. Masks are required. · [x] Parking Lot \"I\" is where you will park. It is located on the corner of [facility] and [facility]. The entrance is on [facility]. · To enter, press the button and the gate will lift. A free token will be provided to exit the lot. EDUCATION INSTRUCTIONS:        [x] Pre-admission Testing educational folder given  [x] Incentive Spirometry,coughing & deep breathing exercises reviewed. [x] Medication information sheet(s)   [x] Fluoroscopy-Xray used in surgery reviewed with patient. Educational pamphlet placed in chart. [x] Pain: Post-op pain is normal and to be expected. You will be asked to rate your pain from 0-10. [x] Ask your nurse for your pain medication. [x] Spine Navigator to see in PAT. MEDICATION INSTRUCTIONS:    [x] Bring a complete list of your medications, please write the last time you took the medicine, give this list to the nurse.   [x] Take the following medications the morning of surgery with 1-2 ounces of water: Duloxetine and Omeprazole   [x] Stop herbal supplements and vitamins 5 days before your surgery. Last dose 5/13/22. [x] DO NOT take any diabetic medicine the morning of surgery. Follow instructions for insulin the day before surgery. [x] If you are diabetic and your blood sugar is low or you feel symptomatic, you may drink 1-2 ounces of apple juice or take a glucose tablet.            -The morning of your procedure, you may call the pre-op area if you have concerns about your blood sugar [phone]. [x] Follow physician instructions regarding any blood thinners you may be taking. WHAT TO EXPECT:    [x] The day of surgery you will be greeted and checked in by the [employer].  In addition, you will be registered in the [place] by a Patient Access Representative. Please bring your photo ID and insurance card. A nurse will greet you in accordance to the time you are needed in the pre-op area to prepare you for surgery. Please do not be discouraged if you are not greeted in the order you arrive as there are many variables that are involved in patient preparation. Your patience is greatly appreciated as you wait for your nurse. Please bring in items such as: books, magazines, newspapers, electronics, or any other items  to occupy your time in the waiting area. [x]  Delays may occur with surgery and staff will make a sincere effort to keep you informed of delays. If any delays occur with your procedure, we apologize ahead of time for your inconvenience as we recognize the value of your time.

## 2022-05-13 ENCOUNTER — HOSPITAL ENCOUNTER (OUTPATIENT)
Dept: GENERAL RADIOLOGY | Age: 62
Discharge: HOME | End: 2022-05-15
Payer: COMMERCIAL

## 2022-05-13 ENCOUNTER — HOSPITAL ENCOUNTER (OUTPATIENT)
Dept: PREADMISSION TESTING | Age: 62
Discharge: HOME | End: 2022-05-13
Payer: COMMERCIAL

## 2022-05-13 VITALS
TEMPERATURE: 97 F | HEIGHT: 64 IN | RESPIRATION RATE: 20 BRPM | DIASTOLIC BLOOD PRESSURE: 75 MMHG | BODY MASS INDEX: 34.15 KG/M2 | OXYGEN SATURATION: 96 % | SYSTOLIC BLOOD PRESSURE: 148 MMHG | HEART RATE: 95 BPM | WEIGHT: 200 LBS

## 2022-05-13 DIAGNOSIS — Z01.818 PRE-OP TESTING: ICD-10-CM

## 2022-05-13 DIAGNOSIS — Z01.812 PRE-OPERATIVE LABORATORY EXAMINATION: Primary | ICD-10-CM

## 2022-05-13 LAB
ABO/RH: NORMAL
ANION GAP SERPL CALCULATED.3IONS-SCNC: 13 MMOL/L (ref 7–16)
ANTIBODY SCREEN: NORMAL
BASOPHILS ABSOLUTE: 0.07 E9/L (ref 0–0.2)
BASOPHILS RELATIVE PERCENT: 0.9 % (ref 0–2)
BUN BLDV-MCNC: 13 MG/DL (ref 6–20)
CALCIUM SERPL-MCNC: 9.4 MG/DL (ref 8.6–10.2)
CHLORIDE BLD-SCNC: 103 MMOL/L (ref 98–107)
CO2: 23 MMOL/L (ref 22–29)
CREAT SERPL-MCNC: 0.7 MG/DL (ref 0.5–1)
EOSINOPHILS ABSOLUTE: 0.39 E9/L (ref 0.05–0.5)
EOSINOPHILS RELATIVE PERCENT: 5 % (ref 0–6)
GFR AFRICAN AMERICAN: >60
GFR NON-AFRICAN AMERICAN: >60 ML/MIN/1.73
GLUCOSE BLD-MCNC: 126 MG/DL (ref 74–99)
HCT VFR BLD CALC: 42.7 % (ref 34–48)
HEMOGLOBIN: 14.2 G/DL (ref 11.5–15.5)
IMMATURE GRANULOCYTES #: 0.02 E9/L
IMMATURE GRANULOCYTES %: 0.3 % (ref 0–5)
INR BLD: 1
LYMPHOCYTES ABSOLUTE: 2.32 E9/L (ref 1.5–4)
LYMPHOCYTES RELATIVE PERCENT: 29.7 % (ref 20–42)
MCH RBC QN AUTO: 31 PG (ref 26–35)
MCHC RBC AUTO-ENTMCNC: 33.3 % (ref 32–34.5)
MCV RBC AUTO: 93.2 FL (ref 80–99.9)
MONOCYTES ABSOLUTE: 0.58 E9/L (ref 0.1–0.95)
MONOCYTES RELATIVE PERCENT: 7.4 % (ref 2–12)
NEUTROPHILS ABSOLUTE: 4.42 E9/L (ref 1.8–7.3)
NEUTROPHILS RELATIVE PERCENT: 56.7 % (ref 43–80)
PDW BLD-RTO: 12.8 FL (ref 11.5–15)
PLATELET # BLD: 275 E9/L (ref 130–450)
PMV BLD AUTO: 10 FL (ref 7–12)
POTASSIUM REFLEX MAGNESIUM: 3.9 MMOL/L (ref 3.5–5)
PROTHROMBIN TIME: 10.6 SEC (ref 9.3–12.4)
RBC # BLD: 4.58 E12/L (ref 3.5–5.5)
SODIUM BLD-SCNC: 139 MMOL/L (ref 132–146)
WBC # BLD: 7.8 E9/L (ref 4.5–11.5)

## 2022-05-13 PROCEDURE — 85610 PROTHROMBIN TIME: CPT

## 2022-05-13 PROCEDURE — 86901 BLOOD TYPING SEROLOGIC RH(D): CPT

## 2022-05-13 PROCEDURE — 87081 CULTURE SCREEN ONLY: CPT

## 2022-05-13 PROCEDURE — 87088 URINE BACTERIA CULTURE: CPT

## 2022-05-13 PROCEDURE — 86850 RBC ANTIBODY SCREEN: CPT

## 2022-05-13 PROCEDURE — 71046 X-RAY EXAM CHEST 2 VIEWS: CPT

## 2022-05-13 PROCEDURE — 36415 COLL VENOUS BLD VENIPUNCTURE: CPT

## 2022-05-13 PROCEDURE — 86900 BLOOD TYPING SEROLOGIC ABO: CPT

## 2022-05-13 PROCEDURE — 80048 BASIC METABOLIC PNL TOTAL CA: CPT

## 2022-05-13 PROCEDURE — 93005 ELECTROCARDIOGRAM TRACING: CPT

## 2022-05-13 PROCEDURE — 85025 COMPLETE CBC W/AUTO DIFF WBC: CPT

## 2022-05-14 LAB — MRSA CULTURE ONLY: NORMAL

## 2022-05-15 LAB — URINE CULTURE, ROUTINE: NORMAL

## 2022-05-15 NOTE — PROGRESS NOTES
Saw pt in PAT, reviewed:    Surgical Procedure-reviewed procedure and post-op events:pre-op/PACU, Unit admission, PT/OT evaluation, Discharge T[address] Stay expectations-reviewed importance of early mobilization. Instructions of Spine Precautions given-PT to review on evaluation. Surgical Pathway Booklet-reviewed and given  Post-op/Discharge Instructions-reviewed activity allowances/restrictions  Use of Incentive Spirometer-instructed on importance of use post-op and continued use @ home to prevent complications. Instructions on use given  Setting realistic pain goals-reaching goal before discharge. ORTHOTICS: Pt has brace from previous OR, fits to support pt    Discharge Plans- home with self/family care. Verbalized understanding of all instructions and questions answered. Contact number given.     Jael Mcqueen RN, Spine Navigator  [phone] Office  [phone] Cell

## 2022-05-16 LAB
EKG ATRIAL RATE: 92 BPM
EKG P AXIS: 42 DEGREES
EKG P-R INTERVAL: 122 MS
EKG Q-T INTERVAL: 368 MS
EKG QRS DURATION: 72 MS
EKG QTC CALCULATION (BAZETT): 455 MS
EKG R AXIS: 52 DEGREES
EKG T AXIS: 58 DEGREES
EKG VENTRICULAR RATE: 92 BPM

## 2022-05-16 PROCEDURE — 93010 ELECTROCARDIOGRAM REPORT: CPT | Performed by: INTERNAL MEDICINE

## 2022-05-18 ENCOUNTER — ANESTHESIA EVENT (OUTPATIENT)
Dept: OPERATING ROOM | Age: 62
DRG: 455 | End: 2022-05-18
Payer: COMMERCIAL

## 2022-05-18 NOTE — H&P
Back Pain   This is a new problem. Episode onset: 2-3 months. The problem occurs constantly. The problem has been gradually worsening since onset. The pain is present in the lumbar spine (and right leg pain into the top of the foot. ). The quality of the pain is described as aching. The pain is severe. The symptoms are aggravated by twisting, standing, sitting and bending. Treatments tried: percocet. The treatment provided mild relief.      Past Medical History:   Diagnosis Date    Arthritis     Depression     DM (diabetes mellitus) ([facility] [address]     History of blood transfusion     @ 2000    Hypertension     IBS (irritable bowel syndrome)     Sleep apnea     doesn't wear mask anymore     Past Surgical History:   Procedure Laterality Date    COLONOSCOPY      EPIDURAL STEROID INJECTION N/A 8/22/2019    LUMBAR EPIDURAL STEROID INJECTION L3-4 WITH LOW VOLUME performed by Jorge Alberto Murphy MD at [address] [place] [address] N/A 10/3/2019    L4-L5 , L5-S1 POSTERIOR INTERBODY FUSION --OARM, AUDIOLOGY, CAGES, PLATES, SCREWS, NIMISHA, NIRAV TABLE, CELL SAVER, PLATELET GEL, GLOBUS performed by Montse Gaffney MD at [address] Right 2/10/2022    CAUDAL  EPIDURAL STEROID INJECTION WITH 60 DEPO RIGHT performed by Jorge Alberto Murphy MD at [address] [place] [address] [address] N/A 3/10/2022    Lumbar epidural steroid injection L3-4 with 80 depo performed by Jorge Alberto Murphy MD at [facility] 2/20/2020    RIGHT DISTAL RADIUS  OPEN REDUCTION INTERNAL FIXATION--SYNTHES performed by Hernan Esqueda MD at [place] [address] History     Socioeconomic History    Marital status:      Spouse name: Not on file    Number of children: Not on file    Years of education: Not on file    Highest education level: Not on file   Occupational History    Not on file   Tobacco Use    Smoking status: Never Smoker    Smokeless tobacco: Never Used   Vaping Use    Vaping Use: Never used   Substance and Sexual Activity    Alcohol use: No    Drug use: Yes     Types: Marijuana Elpidio Beltre    Sexual activity: Yes     Partners: Male   Other Topics Concern    Not on file   Social History Narrative    Not on file     Social Determinants of Health     Financial Resource Strain:     Difficulty of Paying Living Expenses: Not on file   Food Insecurity:     Worried About Running Out of Food in the Last Year: Not on file    Feliberto of Food in the Last Year: Not on file   Transportation Needs:     Lack of Transportation (Medical): Not on file    Lack of Transportation (Non-Medical): Not on file   Physical Activity:     Days of Exercise per Week: Not on file    Minutes of Exercise per Session: Not on file   Stress:     Feeling of Stress : Not on file   Social Connections:     Frequency of Communication with Friends and Family: Not on file    Frequency of Social Gatherings with Friends and Family: Not on file    Attends Episcopalian Services: Not on file    Active Member of [address] [place] [address] or Organizations: Not on file    Attends Club or Organization Meetings: Not on file    Marital Status: Not on file   Intimate Partner Violence:     Fear of Current or Ex-Partner: Not on file    Emotionally Abused: Not on file    Physically Abused: Not on file    Sexually Abused: Not on file   Housing Stability:     Unable to Pay for Housing in the Last Year: Not on file    Number of [place] in the Last Year: Not on file    Unstable Housing in the Last Year: Not on file     Family History   Problem Relation Age of Onset    Arthritis Other     Cancer Other     Depression Other     Diabetes Other     Heart Disease Other     Hypertension Other     Diabetes Mother     High Blood Pressure Mother     Cancer Father      Scheduled Meds:  Continuous Infusions:  PRN Meds:. No Known Allergies          Review of Systems   Constitutional: Negative. HENT: Negative. Eyes: Negative. Respiratory: Negative. Cardiovascular: Negative. Gastrointestinal: Negative. Endocrine: Negative. Genitourinary: Negative. Musculoskeletal: Positive for back pain. Skin: Negative. Allergic/Immunologic: Negative. Neurological: Negative. Hematological: Negative. Psychiatric/Behavioral: Negative.          Objective:   Physical Exam   Constitutional: She is oriented to person, place, and time. She appears well-developed and well-nourished. HENT:   Head: Normocephalic and atraumatic. Eyes: Pupils are equal, round, and reactive to light. EOM are normal.   Neck: Normal range of motion. Neck supple. Pulmonary/Chest: No respiratory distress. Abdominal: She exhibits no distension. Musculoskeletal:   Pain with ROM and palpation of the lumbar spine   Neurological: She is alert and oriented to person, place, and time. She is not disoriented. No cranial nerve deficit or sensory deficit. Gait abnormal. GCS eye subscore is 4. GCS verbal subscore is 5. GCS motor subscore is 6. Reflex Scores:       Patellar reflexes are 2+ on the right side and 2+ on the left side. Achilles reflexes are 2+ on the right side and 2+ on the left side. 4+ right DF and quad   Skin: Skin is warm and dry. Psychiatric: She has a normal mood and affect.         Assessment:   63year old female with acute exacerbation of her chronic low back pain. She has had prior L4-1 fusion.   She has L3-L4 adjacent segment disease              Plan:   I am recommending exploration of L4-S1 fusion with L3_l4 posterior lumbar interbody fusion

## 2022-05-18 NOTE — H&P
Back Pain   This is a new problem. Episode onset: 2-3 months. The problem occurs constantly. The problem has been gradually worsening since onset. The pain is present in the lumbar spine (and right leg pain into the top of the foot. ). The quality of the pain is described as aching. The pain is severe. The symptoms are aggravated by twisting, standing, sitting and bending. Treatments tried: percocet. The treatment provided mild relief.      Past Medical History:   Diagnosis Date    Arthritis     Depression     DM (diabetes mellitus) ([facility] [address]     History of blood transfusion     @ 2000    Hypertension     IBS (irritable bowel syndrome)     Sleep apnea     doesn't wear mask anymore     Past Surgical History:   Procedure Laterality Date    COLONOSCOPY      EPIDURAL STEROID INJECTION N/A 8/22/2019    LUMBAR EPIDURAL STEROID INJECTION L3-4 WITH LOW VOLUME performed by Janie Stone MD at [address] [place] [address] N/A 10/3/2019    L4-L5 , L5-S1 POSTERIOR INTERBODY FUSION --OARM, AUDIOLOGY, CAGES, PLATES, SCREWS, NIMISHA, NIRAV TABLE, CELL SAVER, PLATELET GEL, GLOBUS performed by Jagjit Saleem MD at [address] [place] [address] 2/10/2022    CAUDAL  EPIDURAL STEROID INJECTION WITH 60 DEPO RIGHT performed by Janie Stone MD at [address] [place] [address] 51 S N/A 3/10/2022    Lumbar epidural steroid injection L3-4 with 80 depo performed by Janie Stone MD at [facility] 2/20/2020    RIGHT DISTAL RADIUS  OPEN REDUCTION INTERNAL FIXATION--SYNTHES performed by Carlyle Raya MD at [place] [address] History     Socioeconomic History    Marital status:      Spouse name: Not on file    Number of children: Not on file    Years of education: Not on file    Highest education level: Not on file   Occupational History    Not on file   Tobacco Use    Smoking status: Never Smoker    Smokeless tobacco: Never Used   Vaping Use    Vaping Use: Never used   Substance and Sexual Activity    Alcohol use: No    Drug use: Yes     Types: Marijuana Dauna Other)    Sexual activity: Yes     Partners: Male   Other Topics Concern    Not on file   Social History Narrative    Not on file     Social Determinants of Health     Financial Resource Strain:     Difficulty of Paying Living Expenses: Not on file   Food Insecurity:     Worried About Running Out of Food in the Last Year: Not on file    Feliberto of Food in the Last Year: Not on file   Transportation Needs:     Lack of Transportation (Medical): Not on file    Lack of Transportation (Non-Medical): Not on file   Physical Activity:     Days of Exercise per Week: Not on file    Minutes of Exercise per Session: Not on file   Stress:     Feeling of Stress : Not on file   Social Connections:     Frequency of Communication with Friends and Family: Not on file    Frequency of Social Gatherings with Friends and Family: Not on file    Attends Synagogue Services: Not on file    Active Member of [address] [place] [address] or Organizations: Not on file    Attends Club or Organization Meetings: Not on file    Marital Status: Not on file   Intimate Partner Violence:     Fear of Current or Ex-Partner: Not on file    Emotionally Abused: Not on file    Physically Abused: Not on file    Sexually Abused: Not on file   Housing Stability:     Unable to Pay for Housing in the Last Year: Not on file    Number of [place] in the Last Year: Not on file    Unstable Housing in the Last Year: Not on file     Family History   Problem Relation Age of Onset    Arthritis Other     Cancer Other     Depression Other     Diabetes Other     Heart Disease Other     Hypertension Other     Diabetes Mother     High Blood Pressure Mother     Cancer Father      Scheduled Meds:  Continuous Infusions:  PRN Meds:. No Known Allergies          Review of Systems   Constitutional: Negative. HENT: Negative. Eyes: Negative. Respiratory: Negative. Cardiovascular: Negative. Gastrointestinal: Negative. Endocrine: Negative. Genitourinary: Negative. Musculoskeletal: Positive for back pain. Skin: Negative. Allergic/Immunologic: Negative. Neurological: Negative. Hematological: Negative. Psychiatric/Behavioral: Negative.          Objective:   Physical Exam   Constitutional: She is oriented to person, place, and time. She appears well-developed and well-nourished. HENT:   Head: Normocephalic and atraumatic. Eyes: Pupils are equal, round, and reactive to light. EOM are normal.   Neck: Normal range of motion. Neck supple. Pulmonary/Chest: No respiratory distress. Abdominal: She exhibits no distension. Musculoskeletal:   Pain with ROM and palpation of the lumbar spine   Neurological: She is alert and oriented to person, place, and time. She is not disoriented. No cranial nerve deficit or sensory deficit. Gait abnormal. GCS eye subscore is 4. GCS verbal subscore is 5. GCS motor subscore is 6. Reflex Scores:       Patellar reflexes are 2+ on the right side and 2+ on the left side. Achilles reflexes are 2+ on the right side and 2+ on the left side. 4+ right DF and quad   Skin: Skin is warm and dry. Psychiatric: She has a normal mood and affect.         Assessment:   63year old female with acute exacerbation of her chronic low back pain. She is having 3 month history of severe right leg pain into the top of the foot. Lumbar MRI shows adjacenrt segmt stenosis at L3-L4              Plan:   I am recommending an L3-L4 posterior lumbar interbody fusion with exploration o L4-S1

## 2022-05-19 ENCOUNTER — ANESTHESIA (OUTPATIENT)
Dept: OPERATING ROOM | Age: 62
DRG: 455 | End: 2022-05-19
Payer: COMMERCIAL

## 2022-05-19 ENCOUNTER — APPOINTMENT (OUTPATIENT)
Dept: GENERAL RADIOLOGY | Age: 62
DRG: 455 | End: 2022-05-19
Attending: NEUROLOGICAL SURGERY
Payer: COMMERCIAL

## 2022-05-19 ENCOUNTER — HOSPITAL ENCOUNTER (INPATIENT)
Age: 62
LOS: 3 days | Discharge: HOME HEALTH | DRG: 455 | End: 2022-05-22
Attending: NEUROLOGICAL SURGERY | Admitting: NEUROLOGICAL SURGERY
Payer: COMMERCIAL

## 2022-05-19 DIAGNOSIS — Z01.812 PRE-OPERATIVE LABORATORY EXAMINATION: ICD-10-CM

## 2022-05-19 DIAGNOSIS — M51.36 LUMBAR ADJACENT SEGMENT DISEASE WITH SPONDYLOLISTHESIS: Primary | ICD-10-CM

## 2022-05-19 DIAGNOSIS — M43.16 LUMBAR ADJACENT SEGMENT DISEASE WITH SPONDYLOLISTHESIS: Primary | ICD-10-CM

## 2022-05-19 LAB
METER GLUCOSE: 115 MG/DL (ref 74–99)
METER GLUCOSE: 131 MG/DL (ref 74–99)
METER GLUCOSE: 178 MG/DL (ref 74–99)

## 2022-05-19 PROCEDURE — 2720000010 HC SURG SUPPLY STERILE: Performed by: NEUROLOGICAL SURGERY

## 2022-05-19 PROCEDURE — 6360000002 HC RX W HCPCS: Performed by: NURSE ANESTHETIST, CERTIFIED REGISTERED

## 2022-05-19 PROCEDURE — 63052 LAM FACETC/FRMT ARTHRD LUM 1: CPT | Performed by: NEUROLOGICAL SURGERY

## 2022-05-19 PROCEDURE — 6360000002 HC RX W HCPCS: Performed by: ANESTHESIOLOGY

## 2022-05-19 PROCEDURE — 3700000000 HC ANESTHESIA ATTENDED CARE: Performed by: NEUROLOGICAL SURGERY

## 2022-05-19 PROCEDURE — 3209999900 FLUORO FOR SURGICAL PROCEDURES

## 2022-05-19 PROCEDURE — 6360000002 HC RX W HCPCS: Performed by: NEUROLOGICAL SURGERY

## 2022-05-19 PROCEDURE — 0SG0071 FUSION OF LUMBAR VERTEBRAL JOINT WITH AUTOLOGOUS TISSUE SUBSTITUTE, POSTERIOR APPROACH, POSTERIOR COLUMN, OPEN APPROACH: ICD-10-PCS | Performed by: NEUROLOGICAL SURGERY

## 2022-05-19 PROCEDURE — 22853 INSJ BIOMECHANICAL DEVICE: CPT | Performed by: PHYSICIAN ASSISTANT

## 2022-05-19 PROCEDURE — 2580000003 HC RX 258: Performed by: STUDENT IN AN ORGANIZED HEALTH CARE EDUCATION/TRAINING PROGRAM

## 2022-05-19 PROCEDURE — C1713 ANCHOR/SCREW BN/BN,TIS/BN: HCPCS | Performed by: NEUROLOGICAL SURGERY

## 2022-05-19 PROCEDURE — 1200000000 HC SEMI PRIVATE

## 2022-05-19 PROCEDURE — 7100000000 HC PACU RECOVERY - FIRST 15 MIN: Performed by: NEUROLOGICAL SURGERY

## 2022-05-19 PROCEDURE — 95940 IONM IN OPERATNG ROOM 15 MIN: CPT | Performed by: AUDIOLOGIST

## 2022-05-19 PROCEDURE — 3600000005 HC SURGERY LEVEL 5 BASE: Performed by: NEUROLOGICAL SURGERY

## 2022-05-19 PROCEDURE — A4217 STERILE WATER/SALINE, 500 ML: HCPCS | Performed by: NEUROLOGICAL SURGERY

## 2022-05-19 PROCEDURE — 2580000003 HC RX 258: Performed by: NEUROLOGICAL SURGERY

## 2022-05-19 PROCEDURE — 82962 GLUCOSE BLOOD TEST: CPT

## 2022-05-19 PROCEDURE — 22633 ARTHRD CMBN 1NTRSPC LUMBAR: CPT | Performed by: NEUROLOGICAL SURGERY

## 2022-05-19 PROCEDURE — 0SP004Z REMOVAL OF INTERNAL FIXATION DEVICE FROM LUMBAR VERTEBRAL JOINT, OPEN APPROACH: ICD-10-PCS | Performed by: NEUROLOGICAL SURGERY

## 2022-05-19 PROCEDURE — 6370000000 HC RX 637 (ALT 250 FOR IP): Performed by: NEUROLOGICAL SURGERY

## 2022-05-19 PROCEDURE — 22840 INSERT SPINE FIXATION DEVICE: CPT | Performed by: PHYSICIAN ASSISTANT

## 2022-05-19 PROCEDURE — 2580000003 HC RX 258: Performed by: NURSE ANESTHETIST, CERTIFIED REGISTERED

## 2022-05-19 PROCEDURE — 2500000003 HC RX 250 WO HCPCS: Performed by: NURSE ANESTHETIST, CERTIFIED REGISTERED

## 2022-05-19 PROCEDURE — 0SB20ZZ EXCISION OF LUMBAR VERTEBRAL DISC, OPEN APPROACH: ICD-10-PCS | Performed by: NEUROLOGICAL SURGERY

## 2022-05-19 PROCEDURE — 22840 INSERT SPINE FIXATION DEVICE: CPT | Performed by: NEUROLOGICAL SURGERY

## 2022-05-19 PROCEDURE — 7100000001 HC PACU RECOVERY - ADDTL 15 MIN: Performed by: NEUROLOGICAL SURGERY

## 2022-05-19 PROCEDURE — 22853 INSJ BIOMECHANICAL DEVICE: CPT | Performed by: NEUROLOGICAL SURGERY

## 2022-05-19 PROCEDURE — 3E0U0GB INTRODUCTION OF RECOMBINANT BONE MORPHOGENETIC PROTEIN INTO JOINTS, OPEN APPROACH: ICD-10-PCS | Performed by: NEUROLOGICAL SURGERY

## 2022-05-19 PROCEDURE — 88300 SURGICAL PATH GROSS: CPT

## 2022-05-19 PROCEDURE — 63053 LAM FACTC/FRMT ARTHRD LUM EA: CPT | Performed by: NEUROLOGICAL SURGERY

## 2022-05-19 PROCEDURE — 2709999900 HC NON-CHARGEABLE SUPPLY: Performed by: NEUROLOGICAL SURGERY

## 2022-05-19 PROCEDURE — 61783 SCAN PROC SPINAL: CPT | Performed by: NEUROLOGICAL SURGERY

## 2022-05-19 PROCEDURE — 22633 ARTHRD CMBN 1NTRSPC LUMBAR: CPT | Performed by: PHYSICIAN ASSISTANT

## 2022-05-19 PROCEDURE — 6360000002 HC RX W HCPCS: Performed by: STUDENT IN AN ORGANIZED HEALTH CARE EDUCATION/TRAINING PROGRAM

## 2022-05-19 PROCEDURE — 3600000015 HC SURGERY LEVEL 5 ADDTL 15MIN: Performed by: NEUROLOGICAL SURGERY

## 2022-05-19 PROCEDURE — 00NY0ZZ RELEASE LUMBAR SPINAL CORD, OPEN APPROACH: ICD-10-PCS | Performed by: NEUROLOGICAL SURGERY

## 2022-05-19 PROCEDURE — 3700000001 HC ADD 15 MINUTES (ANESTHESIA): Performed by: NEUROLOGICAL SURGERY

## 2022-05-19 PROCEDURE — 2500000003 HC RX 250 WO HCPCS: Performed by: NEUROLOGICAL SURGERY

## 2022-05-19 PROCEDURE — 0SG00AJ FUSION OF LUMBAR VERTEBRAL JOINT WITH INTERBODY FUSION DEVICE, POSTERIOR APPROACH, ANTERIOR COLUMN, OPEN APPROACH: ICD-10-PCS | Performed by: NEUROLOGICAL SURGERY

## 2022-05-19 PROCEDURE — 95908 NRV CNDJ TST 3-4 STUDIES: CPT | Performed by: AUDIOLOGIST

## 2022-05-19 PROCEDURE — 88304 TISSUE EXAM BY PATHOLOGIST: CPT

## 2022-05-19 PROCEDURE — 63052 LAM FACETC/FRMT ARTHRD LUM 1: CPT | Performed by: PHYSICIAN ASSISTANT

## 2022-05-19 PROCEDURE — C1729 CATH, DRAINAGE: HCPCS | Performed by: NEUROLOGICAL SURGERY

## 2022-05-19 PROCEDURE — 63053 LAM FACTC/FRMT ARTHRD LUM EA: CPT | Performed by: PHYSICIAN ASSISTANT

## 2022-05-19 DEVICE — THREADED LOCKING CAP, CREO
Type: IMPLANTABLE DEVICE | Site: SPINE LUMBAR | Status: FUNCTIONAL
Brand: CREO

## 2022-05-19 DEVICE — BONE GRAFT KIT 7510200 INFUSE SMALL
Type: IMPLANTABLE DEVICE | Site: SPINE LUMBAR | Status: FUNCTIONAL
Brand: INFUSE® BONE GRAFT

## 2022-05-19 DEVICE — 5.5MM CURVED ROD, TITANIUM ALLOY, 40MM LENGTH
Type: IMPLANTABLE DEVICE | Site: SPINE LUMBAR | Status: FUNCTIONAL
Brand: CREO

## 2022-05-19 DEVICE — CREO® THREADED 7.5 X 45MM POLYAXIAL SCREW
Type: IMPLANTABLE DEVICE | Site: SPINE LUMBAR | Status: FUNCTIONAL
Brand: CREO

## 2022-05-19 DEVICE — CREO® THREADED 6.5 X 50MM POLYAXIAL SCREW
Type: IMPLANTABLE DEVICE | Site: SPINE LUMBAR | Status: FUNCTIONAL
Brand: CREO

## 2022-05-19 RX ORDER — SODIUM CHLORIDE 0.9 % (FLUSH) 0.9 %
5-40 SYRINGE (ML) INJECTION EVERY 12 HOURS SCHEDULED
Status: DISCONTINUED | OUTPATIENT
Start: 2022-05-19 | End: 2022-05-19 | Stop reason: HOSPADM

## 2022-05-19 RX ORDER — ONDANSETRON 2 MG/ML
4 INJECTION INTRAMUSCULAR; INTRAVENOUS EVERY 6 HOURS PRN
Status: DISCONTINUED | OUTPATIENT
Start: 2022-05-19 | End: 2022-05-22 | Stop reason: HOSPADM

## 2022-05-19 RX ORDER — BENAZEPRIL HYDROCHLORIDE 10 MG/1
10 TABLET ORAL DAILY
Status: DISCONTINUED | OUTPATIENT
Start: 2022-05-19 | End: 2022-05-19 | Stop reason: CLARIF

## 2022-05-19 RX ORDER — SODIUM CHLORIDE 9 MG/ML
INJECTION, SOLUTION INTRAVENOUS CONTINUOUS PRN
Status: DISCONTINUED | OUTPATIENT
Start: 2022-05-19 | End: 2022-05-19 | Stop reason: SDUPTHER

## 2022-05-19 RX ORDER — PROPOFOL 10 MG/ML
INJECTION, EMULSION INTRAVENOUS PRN
Status: DISCONTINUED | OUTPATIENT
Start: 2022-05-19 | End: 2022-05-19 | Stop reason: SDUPTHER

## 2022-05-19 RX ORDER — POLYETHYLENE GLYCOL 3350 17 G/17G
17 POWDER, FOR SOLUTION ORAL DAILY
Status: DISCONTINUED | OUTPATIENT
Start: 2022-05-19 | End: 2022-05-22 | Stop reason: HOSPADM

## 2022-05-19 RX ORDER — SODIUM CHLORIDE 0.9 % (FLUSH) 0.9 %
5-40 SYRINGE (ML) INJECTION EVERY 12 HOURS SCHEDULED
Status: DISCONTINUED | OUTPATIENT
Start: 2022-05-19 | End: 2022-05-22 | Stop reason: HOSPADM

## 2022-05-19 RX ORDER — PHENYLEPHRINE HCL IN 0.9% NACL 1 MG/10 ML
SYRINGE (ML) INTRAVENOUS PRN
Status: DISCONTINUED | OUTPATIENT
Start: 2022-05-19 | End: 2022-05-19 | Stop reason: SDUPTHER

## 2022-05-19 RX ORDER — LISINOPRIL 20 MG/1
10 TABLET ORAL DAILY
Status: DISCONTINUED | OUTPATIENT
Start: 2022-05-19 | End: 2022-05-22 | Stop reason: HOSPADM

## 2022-05-19 RX ORDER — MORPHINE SULFATE 2 MG/ML
2 INJECTION, SOLUTION INTRAMUSCULAR; INTRAVENOUS
Status: DISCONTINUED | OUTPATIENT
Start: 2022-05-19 | End: 2022-05-22 | Stop reason: HOSPADM

## 2022-05-19 RX ORDER — SODIUM CHLORIDE 9 MG/ML
INJECTION, SOLUTION INTRAVENOUS PRN
Status: DISCONTINUED | OUTPATIENT
Start: 2022-05-19 | End: 2022-05-22 | Stop reason: HOSPADM

## 2022-05-19 RX ORDER — VANCOMYCIN HYDROCHLORIDE 500 MG/10ML
INJECTION, POWDER, LYOPHILIZED, FOR SOLUTION INTRAVENOUS PRN
Status: DISCONTINUED | OUTPATIENT
Start: 2022-05-19 | End: 2022-05-19 | Stop reason: ALTCHOICE

## 2022-05-19 RX ORDER — ONDANSETRON 2 MG/ML
INJECTION INTRAMUSCULAR; INTRAVENOUS PRN
Status: DISCONTINUED | OUTPATIENT
Start: 2022-05-19 | End: 2022-05-19 | Stop reason: SDUPTHER

## 2022-05-19 RX ORDER — DICYCLOMINE HYDROCHLORIDE 10 MG/1
10 CAPSULE ORAL 2 TIMES DAILY
Status: DISCONTINUED | OUTPATIENT
Start: 2022-05-19 | End: 2022-05-22 | Stop reason: HOSPADM

## 2022-05-19 RX ORDER — LIDOCAINE HYDROCHLORIDE AND EPINEPHRINE 5; 5 MG/ML; UG/ML
INJECTION, SOLUTION INFILTRATION; PERINEURAL PRN
Status: DISCONTINUED | OUTPATIENT
Start: 2022-05-19 | End: 2022-05-19 | Stop reason: ALTCHOICE

## 2022-05-19 RX ORDER — DIAPER,BRIEF,INFANT-TODD,DISP
EACH MISCELLANEOUS PRN
Status: DISCONTINUED | OUTPATIENT
Start: 2022-05-19 | End: 2022-05-19 | Stop reason: ALTCHOICE

## 2022-05-19 RX ORDER — FENTANYL CITRATE 50 UG/ML
INJECTION, SOLUTION INTRAMUSCULAR; INTRAVENOUS PRN
Status: DISCONTINUED | OUTPATIENT
Start: 2022-05-19 | End: 2022-05-19 | Stop reason: SDUPTHER

## 2022-05-19 RX ORDER — MULTIVITAMIN WITH IRON
1 TABLET ORAL DAILY
Status: DISCONTINUED | OUTPATIENT
Start: 2022-05-20 | End: 2022-05-22 | Stop reason: HOSPADM

## 2022-05-19 RX ORDER — ATORVASTATIN CALCIUM 20 MG/1
10 TABLET, FILM COATED ORAL EVERY EVENING
Status: DISCONTINUED | OUTPATIENT
Start: 2022-05-19 | End: 2022-05-22 | Stop reason: HOSPADM

## 2022-05-19 RX ORDER — ACETAMINOPHEN 325 MG/1
650 TABLET ORAL EVERY 6 HOURS SCHEDULED
Status: DISCONTINUED | OUTPATIENT
Start: 2022-05-19 | End: 2022-05-22 | Stop reason: HOSPADM

## 2022-05-19 RX ORDER — INSULIN LISPRO 100 [IU]/ML
0-12 INJECTION, SOLUTION INTRAVENOUS; SUBCUTANEOUS
Status: DISCONTINUED | OUTPATIENT
Start: 2022-05-19 | End: 2022-05-19 | Stop reason: DRUGHIGH

## 2022-05-19 RX ORDER — BISACODYL 10 MG
10 SUPPOSITORY, RECTAL RECTAL DAILY PRN
Status: DISCONTINUED | OUTPATIENT
Start: 2022-05-19 | End: 2022-05-22 | Stop reason: HOSPADM

## 2022-05-19 RX ORDER — OMEPRAZOLE 20 MG/1
20 CAPSULE, DELAYED RELEASE ORAL DAILY
Status: DISCONTINUED | OUTPATIENT
Start: 2022-05-19 | End: 2022-05-19 | Stop reason: CLARIF

## 2022-05-19 RX ORDER — SUCCINYLCHOLINE/SOD CL,ISO/PF 200MG/10ML
SYRINGE (ML) INTRAVENOUS PRN
Status: DISCONTINUED | OUTPATIENT
Start: 2022-05-19 | End: 2022-05-19 | Stop reason: SDUPTHER

## 2022-05-19 RX ORDER — OXYCODONE HYDROCHLORIDE 5 MG/1
5 TABLET ORAL EVERY 4 HOURS PRN
Status: DISCONTINUED | OUTPATIENT
Start: 2022-05-19 | End: 2022-05-22 | Stop reason: HOSPADM

## 2022-05-19 RX ORDER — MORPHINE SULFATE 4 MG/ML
4 INJECTION, SOLUTION INTRAMUSCULAR; INTRAVENOUS
Status: DISCONTINUED | OUTPATIENT
Start: 2022-05-19 | End: 2022-05-22 | Stop reason: HOSPADM

## 2022-05-19 RX ORDER — SODIUM CHLORIDE 9 MG/ML
INJECTION, SOLUTION INTRAVENOUS PRN
Status: DISCONTINUED | OUTPATIENT
Start: 2022-05-19 | End: 2022-05-19 | Stop reason: HOSPADM

## 2022-05-19 RX ORDER — LIDOCAINE HYDROCHLORIDE 20 MG/ML
INJECTION, SOLUTION INTRAVENOUS PRN
Status: DISCONTINUED | OUTPATIENT
Start: 2022-05-19 | End: 2022-05-19 | Stop reason: SDUPTHER

## 2022-05-19 RX ORDER — SODIUM CHLORIDE 0.9 % (FLUSH) 0.9 %
5-40 SYRINGE (ML) INJECTION PRN
Status: DISCONTINUED | OUTPATIENT
Start: 2022-05-19 | End: 2022-05-22 | Stop reason: HOSPADM

## 2022-05-19 RX ORDER — SODIUM CHLORIDE 0.9 % (FLUSH) 0.9 %
5-40 SYRINGE (ML) INJECTION PRN
Status: DISCONTINUED | OUTPATIENT
Start: 2022-05-19 | End: 2022-05-19 | Stop reason: HOSPADM

## 2022-05-19 RX ORDER — SODIUM CHLORIDE 9 MG/ML
INJECTION, SOLUTION INTRAVENOUS CONTINUOUS
Status: DISCONTINUED | OUTPATIENT
Start: 2022-05-19 | End: 2022-05-22 | Stop reason: HOSPADM

## 2022-05-19 RX ORDER — NEOSTIGMINE METHYLSULFATE 1 MG/ML
INJECTION, SOLUTION INTRAVENOUS PRN
Status: DISCONTINUED | OUTPATIENT
Start: 2022-05-19 | End: 2022-05-19 | Stop reason: SDUPTHER

## 2022-05-19 RX ORDER — INSULIN LISPRO 100 [IU]/ML
0-6 INJECTION, SOLUTION INTRAVENOUS; SUBCUTANEOUS NIGHTLY
Status: DISCONTINUED | OUTPATIENT
Start: 2022-05-19 | End: 2022-05-19

## 2022-05-19 RX ORDER — SODIUM PHOSPHATE, DIBASIC AND SODIUM PHOSPHATE, MONOBASIC 7; 19 G/133ML; G/133ML
1 ENEMA RECTAL DAILY PRN
Status: DISCONTINUED | OUTPATIENT
Start: 2022-05-19 | End: 2022-05-22 | Stop reason: HOSPADM

## 2022-05-19 RX ORDER — ONDANSETRON 2 MG/ML
4 INJECTION INTRAMUSCULAR; INTRAVENOUS
Status: DISCONTINUED | OUTPATIENT
Start: 2022-05-19 | End: 2022-05-19 | Stop reason: HOSPADM

## 2022-05-19 RX ORDER — SENNA AND DOCUSATE SODIUM 50; 8.6 MG/1; MG/1
1 TABLET, FILM COATED ORAL 2 TIMES DAILY
Status: DISCONTINUED | OUTPATIENT
Start: 2022-05-19 | End: 2022-05-22 | Stop reason: HOSPADM

## 2022-05-19 RX ORDER — GLYCOPYRROLATE 1 MG/5 ML
SYRINGE (ML) INTRAVENOUS PRN
Status: DISCONTINUED | OUTPATIENT
Start: 2022-05-19 | End: 2022-05-19 | Stop reason: SDUPTHER

## 2022-05-19 RX ORDER — ONDANSETRON 4 MG/1
4 TABLET, ORALLY DISINTEGRATING ORAL EVERY 8 HOURS PRN
Status: DISCONTINUED | OUTPATIENT
Start: 2022-05-19 | End: 2022-05-22 | Stop reason: HOSPADM

## 2022-05-19 RX ORDER — DEXTROSE MONOHYDRATE 50 MG/ML
100 INJECTION, SOLUTION INTRAVENOUS PRN
Status: DISCONTINUED | OUTPATIENT
Start: 2022-05-19 | End: 2022-05-22 | Stop reason: HOSPADM

## 2022-05-19 RX ORDER — DEXTROSE MONOHYDRATE 25 G/50ML
12.5 INJECTION, SOLUTION INTRAVENOUS PRN
Status: DISCONTINUED | OUTPATIENT
Start: 2022-05-19 | End: 2022-05-22 | Stop reason: HOSPADM

## 2022-05-19 RX ORDER — CYCLOBENZAPRINE HCL 10 MG
10 TABLET ORAL 3 TIMES DAILY PRN
Status: DISCONTINUED | OUTPATIENT
Start: 2022-05-19 | End: 2022-05-22 | Stop reason: HOSPADM

## 2022-05-19 RX ORDER — MIDAZOLAM HYDROCHLORIDE 1 MG/ML
INJECTION INTRAMUSCULAR; INTRAVENOUS PRN
Status: DISCONTINUED | OUTPATIENT
Start: 2022-05-19 | End: 2022-05-19 | Stop reason: SDUPTHER

## 2022-05-19 RX ORDER — INSULIN LISPRO 100 [IU]/ML
0-6 INJECTION, SOLUTION INTRAVENOUS; SUBCUTANEOUS
Status: DISCONTINUED | OUTPATIENT
Start: 2022-05-19 | End: 2022-05-22 | Stop reason: HOSPADM

## 2022-05-19 RX ORDER — OXYCODONE HYDROCHLORIDE 10 MG/1
10 TABLET ORAL EVERY 4 HOURS PRN
Status: DISCONTINUED | OUTPATIENT
Start: 2022-05-19 | End: 2022-05-22 | Stop reason: HOSPADM

## 2022-05-19 RX ORDER — DEXAMETHASONE SODIUM PHOSPHATE 10 MG/ML
INJECTION INTRAMUSCULAR; INTRAVENOUS PRN
Status: DISCONTINUED | OUTPATIENT
Start: 2022-05-19 | End: 2022-05-19 | Stop reason: SDUPTHER

## 2022-05-19 RX ORDER — DIPHENHYDRAMINE HYDROCHLORIDE 50 MG/ML
25 INJECTION INTRAMUSCULAR; INTRAVENOUS EVERY 6 HOURS PRN
Status: DISCONTINUED | OUTPATIENT
Start: 2022-05-19 | End: 2022-05-22 | Stop reason: HOSPADM

## 2022-05-19 RX ORDER — PANTOPRAZOLE SODIUM 40 MG/1
40 TABLET, DELAYED RELEASE ORAL
Status: DISCONTINUED | OUTPATIENT
Start: 2022-05-20 | End: 2022-05-22 | Stop reason: HOSPADM

## 2022-05-19 RX ORDER — BUPIVACAINE HYDROCHLORIDE 2.5 MG/ML
INJECTION, SOLUTION EPIDURAL; INFILTRATION; INTRACAUDAL PRN
Status: DISCONTINUED | OUTPATIENT
Start: 2022-05-19 | End: 2022-05-19 | Stop reason: ALTCHOICE

## 2022-05-19 RX ORDER — DULOXETIN HYDROCHLORIDE 60 MG/1
60 CAPSULE, DELAYED RELEASE ORAL DAILY
Status: DISCONTINUED | OUTPATIENT
Start: 2022-05-20 | End: 2022-05-22 | Stop reason: HOSPADM

## 2022-05-19 RX ORDER — ROCURONIUM BROMIDE 10 MG/ML
INJECTION, SOLUTION INTRAVENOUS PRN
Status: DISCONTINUED | OUTPATIENT
Start: 2022-05-19 | End: 2022-05-19 | Stop reason: SDUPTHER

## 2022-05-19 RX ORDER — DIPHENHYDRAMINE HCL 25 MG
25 TABLET ORAL EVERY 6 HOURS PRN
Status: DISCONTINUED | OUTPATIENT
Start: 2022-05-19 | End: 2022-05-22 | Stop reason: HOSPADM

## 2022-05-19 RX ORDER — SODIUM CHLORIDE 9 MG/ML
INJECTION, SOLUTION INTRAVENOUS CONTINUOUS
Status: DISCONTINUED | OUTPATIENT
Start: 2022-05-19 | End: 2022-05-19 | Stop reason: HOSPADM

## 2022-05-19 RX ORDER — DOCUSATE SODIUM 100 MG/1
200 CAPSULE, LIQUID FILLED ORAL 2 TIMES DAILY
Status: DISCONTINUED | OUTPATIENT
Start: 2022-05-19 | End: 2022-05-22 | Stop reason: HOSPADM

## 2022-05-19 RX ADMIN — HYDROMORPHONE HYDROCHLORIDE 0.5 MG: 1 INJECTION, SOLUTION INTRAMUSCULAR; INTRAVENOUS; SUBCUTANEOUS at 14:51

## 2022-05-19 RX ADMIN — SODIUM CHLORIDE: 9 INJECTION, SOLUTION INTRAVENOUS at 06:17

## 2022-05-19 RX ADMIN — ROCURONIUM BROMIDE 10 MG: 10 SOLUTION INTRAVENOUS at 07:07

## 2022-05-19 RX ADMIN — ROCURONIUM BROMIDE 10 MG: 10 SOLUTION INTRAVENOUS at 07:21

## 2022-05-19 RX ADMIN — Medication 100 MCG: at 07:03

## 2022-05-19 RX ADMIN — Medication 1 MG: at 09:23

## 2022-05-19 RX ADMIN — DICYCLOMINE HYDROCHLORIDE 10 MG: 10 CAPSULE ORAL at 23:36

## 2022-05-19 RX ADMIN — SODIUM CHLORIDE, PRESERVATIVE FREE 10 ML: 5 INJECTION INTRAVENOUS at 21:29

## 2022-05-19 RX ADMIN — HYDROMORPHONE HYDROCHLORIDE 0.5 MG: 1 INJECTION, SOLUTION INTRAMUSCULAR; INTRAVENOUS; SUBCUTANEOUS at 10:40

## 2022-05-19 RX ADMIN — PHENYLEPHRINE HYDROCHLORIDE 30 MCG/MIN: 10 INJECTION, SOLUTION INTRAMUSCULAR; INTRAVENOUS; SUBCUTANEOUS at 07:41

## 2022-05-19 RX ADMIN — Medication 100 MCG: at 06:57

## 2022-05-19 RX ADMIN — OXYCODONE HYDROCHLORIDE 10 MG: 10 TABLET ORAL at 21:26

## 2022-05-19 RX ADMIN — OXYCODONE HYDROCHLORIDE 10 MG: 10 TABLET ORAL at 17:34

## 2022-05-19 RX ADMIN — DOCUSATE SODIUM 200 MG: 100 CAPSULE, LIQUID FILLED ORAL at 21:26

## 2022-05-19 RX ADMIN — ONDANSETRON 4 MG: 2 INJECTION INTRAMUSCULAR; INTRAVENOUS at 09:01

## 2022-05-19 RX ADMIN — ROCURONIUM BROMIDE 10 MG: 10 SOLUTION INTRAVENOUS at 06:31

## 2022-05-19 RX ADMIN — BISACODYL 5 MG: 5 TABLET, COATED ORAL at 16:05

## 2022-05-19 RX ADMIN — CEFAZOLIN 2000 MG: 2 INJECTION, POWDER, FOR SOLUTION INTRAMUSCULAR; INTRAVENOUS at 14:49

## 2022-05-19 RX ADMIN — INSULIN LISPRO 2 UNITS: 100 INJECTION, SOLUTION INTRAVENOUS; SUBCUTANEOUS at 16:09

## 2022-05-19 RX ADMIN — HYDROMORPHONE HYDROCHLORIDE 0.5 MG: 1 INJECTION, SOLUTION INTRAMUSCULAR; INTRAVENOUS; SUBCUTANEOUS at 10:35

## 2022-05-19 RX ADMIN — FENTANYL CITRATE 50 MCG: 50 INJECTION, SOLUTION INTRAMUSCULAR; INTRAVENOUS at 08:45

## 2022-05-19 RX ADMIN — FENTANYL CITRATE 50 MCG: 50 INJECTION, SOLUTION INTRAMUSCULAR; INTRAVENOUS at 09:47

## 2022-05-19 RX ADMIN — HYDROMORPHONE HYDROCHLORIDE 0.25 MG: 1 INJECTION, SOLUTION INTRAMUSCULAR; INTRAVENOUS; SUBCUTANEOUS at 12:43

## 2022-05-19 RX ADMIN — ATORVASTATIN CALCIUM 10 MG: 20 TABLET, FILM COATED ORAL at 17:31

## 2022-05-19 RX ADMIN — MIDAZOLAM 2 MG: 1 INJECTION INTRAMUSCULAR; INTRAVENOUS at 06:26

## 2022-05-19 RX ADMIN — Medication 0.2 MG: at 09:23

## 2022-05-19 RX ADMIN — PROPOFOL 190 MG: 10 INJECTION, EMULSION INTRAVENOUS at 06:31

## 2022-05-19 RX ADMIN — LIDOCAINE HYDROCHLORIDE 60 MG: 20 INJECTION, SOLUTION INTRAVENOUS at 06:31

## 2022-05-19 RX ADMIN — FENTANYL CITRATE 50 MCG: 50 INJECTION, SOLUTION INTRAMUSCULAR; INTRAVENOUS at 08:31

## 2022-05-19 RX ADMIN — SODIUM CHLORIDE: 9 INJECTION, SOLUTION INTRAVENOUS at 06:28

## 2022-05-19 RX ADMIN — CEFAZOLIN 2000 MG: 2 INJECTION, POWDER, FOR SOLUTION INTRAMUSCULAR; INTRAVENOUS at 23:37

## 2022-05-19 RX ADMIN — ACETAMINOPHEN 650 MG: 325 TABLET ORAL at 23:38

## 2022-05-19 RX ADMIN — LISINOPRIL 10 MG: 20 TABLET ORAL at 16:05

## 2022-05-19 RX ADMIN — Medication 2 MG: at 08:50

## 2022-05-19 RX ADMIN — Medication 2000 MG: at 06:35

## 2022-05-19 RX ADMIN — DEXAMETHASONE SODIUM PHOSPHATE 10 MG: 10 INJECTION INTRAMUSCULAR; INTRAVENOUS at 06:35

## 2022-05-19 RX ADMIN — HYDROMORPHONE HYDROCHLORIDE 0.5 MG: 1 INJECTION, SOLUTION INTRAMUSCULAR; INTRAVENOUS; SUBCUTANEOUS at 14:43

## 2022-05-19 RX ADMIN — Medication 0.4 MG: at 08:50

## 2022-05-19 RX ADMIN — ROCURONIUM BROMIDE 40 MG: 10 SOLUTION INTRAVENOUS at 06:36

## 2022-05-19 RX ADMIN — Medication 100 MCG: at 07:09

## 2022-05-19 RX ADMIN — Medication 100 MCG: at 07:14

## 2022-05-19 RX ADMIN — Medication 100 MCG: at 07:24

## 2022-05-19 RX ADMIN — ACETAMINOPHEN 650 MG: 325 TABLET ORAL at 17:32

## 2022-05-19 RX ADMIN — SENNOSIDES AND DOCUSATE SODIUM 1 TABLET: 50; 8.6 TABLET ORAL at 21:26

## 2022-05-19 RX ADMIN — FENTANYL CITRATE 100 MCG: 50 INJECTION, SOLUTION INTRAMUSCULAR; INTRAVENOUS at 06:31

## 2022-05-19 RX ADMIN — FENTANYL CITRATE 50 MCG: 50 INJECTION, SOLUTION INTRAMUSCULAR; INTRAVENOUS at 09:00

## 2022-05-19 RX ADMIN — SODIUM CHLORIDE: 9 INJECTION, SOLUTION INTRAVENOUS at 15:29

## 2022-05-19 RX ADMIN — Medication 140 MG: at 06:31

## 2022-05-19 RX ADMIN — POLYETHYLENE GLYCOL 3350 17 G: 17 POWDER, FOR SOLUTION ORAL at 16:05

## 2022-05-19 ASSESSMENT — PAIN SCALES - GENERAL
PAINLEVEL_OUTOF10: 6
PAINLEVEL_OUTOF10: 3
PAINLEVEL_OUTOF10: 0
PAINLEVEL_OUTOF10: 3
PAINLEVEL_OUTOF10: 8
PAINLEVEL_OUTOF10: 3
PAINLEVEL_OUTOF10: 4
PAINLEVEL_OUTOF10: 2
PAINLEVEL_OUTOF10: 7

## 2022-05-19 ASSESSMENT — PAIN DESCRIPTION - ORIENTATION
ORIENTATION: MID
ORIENTATION: LOWER
ORIENTATION: MID
ORIENTATION: LOWER;MID

## 2022-05-19 ASSESSMENT — PAIN DESCRIPTION - ONSET
ONSET: ON-GOING

## 2022-05-19 ASSESSMENT — PAIN DESCRIPTION - FREQUENCY
FREQUENCY: CONTINUOUS

## 2022-05-19 ASSESSMENT — PAIN DESCRIPTION - DESCRIPTORS
DESCRIPTORS: ACHING;BURNING;CRAMPING
DESCRIPTORS: ACHING;BURNING;CRAMPING
DESCRIPTORS: ACHING;DISCOMFORT
DESCRIPTORS: ACHING;BURNING;CRAMPING

## 2022-05-19 ASSESSMENT — PAIN DESCRIPTION - LOCATION
LOCATION: BACK

## 2022-05-19 ASSESSMENT — PAIN DESCRIPTION - PAIN TYPE
TYPE: SURGICAL PAIN

## 2022-05-19 ASSESSMENT — PAIN - FUNCTIONAL ASSESSMENT
PAIN_FUNCTIONAL_ASSESSMENT: PREVENTS OR INTERFERES SOME ACTIVE ACTIVITIES AND ADLS
PAIN_FUNCTIONAL_ASSESSMENT: NONE - DENIES PAIN

## 2022-05-19 ASSESSMENT — LIFESTYLE VARIABLES: SMOKING_STATUS: 1

## 2022-05-19 NOTE — BRIEF OP NOTE
Brief Postoperative Note      Patient: Misty Joe  YOB: 1960  MRN: [id number]    Date of Procedure: 5/19/2022    Pre-Op Diagnosis: PRIOR L4-S1 FUSION, L3-L4 ADJACENT SEGMENT STENOSIS    Post-Op Diagnosis: Same       Procedure(s):  EXPLORATION OF L4-S1 FUSION, L3-L4 POSTERIOR LUMBAR INTERBODY FUSION    Surgeon(s):  Leroy Davison MD    Assistant:  Physician Assistant: Shanda Nolan PA-C    Anesthesia: General    Estimated Blood Loss (mL): 833     Complications: None    Specimens:   ID Type Source Tests Collected by Time Destination   A : LUMBAR DISC Tissue Spine SURGICAL PATHOLOGY Leroy Davison MD 5/19/2022 Clotilda Flair    B Mellody Scales, CAPS Hardware Hardware SURGICAL PATHOLOGY Leroy Davison MD 5/19/2022 0901        Implants:  Implant Name Type Inv. Item Serial No.  Lot No. LRB No. Used Action   SCREW SPNL L45MM DIA7. [address] NONCANNULATED ANUJA - [id number]  SCREW SPNL L45MM DIA7. 5MM THORLUM PEDCL NONCANNULATED ANUJA  [employer]  N/A 2 Implanted   SCREW SPNL L50MM DIA6.5MM THORLUM PEDCL NONCANNULATED ANUJA - [id number]  SCREW SPNL L50MM DIA6.5MM THORLUM PEDCL NONCANNULATED ANUJA  [employer]  N/A 2 Implanted   CAP SPNL THORLUM ANUJA THRD CREO - [id number]  CAP SPNL THORLUM ANUJA THRD CREO  [employer]WD  N/A 4 Implanted   KIT GRAFT BONE SM RHBMP-2 4.2MG INJ 5ML CONTAIN NDL 20GA - [id number]  KIT GRAFT BONE SM RHBMP-2 4.2MG INJ 5ML CONTAIN NDL 20GA  [email or web address] SPINALGRAFT TECH-WD [id number] N/A 1 Implanted   [employer] SABLE SPACER, 10X22, 6-12MM, 8 DEGREE     [id number] N/A 2 Implanted   FELICIA SPNL L40MM DIA5. 5MM TI ALLY CRV CREO - [id number]  FELICIA SPNL L40MM DIA5. 5MM TI ALLY CRV CREO  [employer]  N/A 2 Implanted         Drains:   Closed/Suction Drain Inferior Back Accordion (Active)       Urinary Catheter Guerrero (Active)   $ Urethral catheter insertion Inserted for procedure 05/19/22 0705   Catheter Indications Perioperative use for selected surgical procedures 05/19/22 0705   Urine Color Christie 05/19/22 0705   Urine Appearance Clear 05/19/22 0705   Collection Container Standard 05/19/22 0705       Findings: see dictated op note    Electronically signed by Puma Mckeon MD on 5/19/2022 at 9:32 AM

## 2022-05-19 NOTE — CONSULTS
Hospital Medicine  Consult History & Physical        Reason for consult:  treatment of dm 2 during hospital stay    Date of Service: Pt seen/examined in consultation on 5/19/2022    History Of Present Illness:    Ms. Tara Tineo, a 63y.o. year old female  Presents for elective surgery     EXPLORATION OF L4-S1 FUSION, L3-L4 POSTERIOR LUMBAR INTERBODY FUSION     Pre op diagniosis      PRIOR L4-S1 FUSION, L3-L4 ADJACENT SEGMENT STENOSIS    Low back pain with sciatica r side   worse than left  dm2 on orals diagnosed 4 years ago    On metformin   den dm complications such as neuropathy/retinopathy/or nephropathy   follows with op provider for dm care   does not routinely check her bs   is unaware of most recent hgba1c   Recent preop gluc in acceptable range 115-126  addiitonal hx listed below  She is on ppi for 'heartburn\"    Past Medical History:        Diagnosis Date    Arthritis     Depression     DM (diabetes mellitus) ([facility] [address]     History of blood transfusion     @ 2000    Hypertension     IBS (irritable bowel syndrome)     Sleep apnea     doesn't wear mask anymore       Past Surgical History:        Procedure Laterality Date    COLONOSCOPY      EPIDURAL STEROID INJECTION N/A 8/22/2019    LUMBAR EPIDURAL STEROID INJECTION L3-4 WITH LOW VOLUME performed by Amelie Mendiola MD at [address] [place] [address] N/A 10/3/2019    L4-L5 , L5-S1 POSTERIOR INTERBODY FUSION --OARM, AUDIOLOGY, CAGES, PLATES, SCREWS, NIMISHA, NIRAV TABLE, CELL SAVER, PLATELET GEL, GLOBUS performed by Simone Molina MD at [facility] Right 2/10/2022    CAUDAL  EPIDURAL STEROID INJECTION WITH 60 DEPO RIGHT performed by Amelie Mendiola MD at [address] [place] [address] 51 S N/A 3/10/2022    Lumbar epidural steroid injection L3-4 with 80 depo performed by Amelie Mend[facility]ola MD at [facility] Right 2/20/2020    RIGHT DISTAL RADIUS  OPEN REDUCTION INTERNAL FIXATION--SYNTHES performed by Natanael Flowers MD at [facility] OR       Medications Prior to Admission:    Prior to Admission medications    Medication Sig Start Date End Date Taking? Authorizing Provider   DULoxetine (CYMBALTA) 60 MG extended release capsule Take 60 mg by mouth daily    Historical Provider, MD   atorvastatin (LIPITOR) 10 MG tablet Take 10 mg by mouth daily    Historical Provider, MD   benazepril (LOTENSIN) 10 MG tablet Take 10 mg by mouth daily    Historical Provider, MD   metFORMIN (GLUCOPHAGE) 1000 MG tablet Take 1,000 mg by mouth 2 times daily (with meals)    Historical Provider, MD   dicyclomine (BENTYL) 10 MG capsule Take 10 mg by mouth 2 times daily     Historical Provider, MD   omeprazole (PRILOSEC) 20 MG capsule Take 20 mg by mouth daily. Historical Provider, MD   multivitamin [facility]) per tablet Take 1 tablet by mouth daily. Historical Provider, MD       Allergies:  Patient has no known allergies. Social History:      TOBACCO:   reports that she has never smoked. She has never used smokeless tobacco.  ETOH:   reports no history of alcohol use. Family History:      Reviewed in detail and negative for DM, CAD, Cancer, CVA.  Positive as follows:        Problem Relation Age of Onset    Arthritis Other     Cancer Other     Depression Other     Diabetes Other     Heart Disease Other     Hypertension Other     Diabetes Mother     High Blood Pressure Mother     Cancer Father        REVIEW OF SYSTEMS:    Den recent symptoms of ha or cp or sob  Has daily loose stools due to IBS  And is on oral remedy which she feels is beneicial  Denies recent fevers  Den use of steroids    PHYSICAL EXAM:  BP (!) 119/91   Pulse 90   Temp 98 °F (36.7 °C) (Temporal)   Resp 16   Ht 5' 4\" (1.626 m)   Wt 200 lb (90.7 kg)   SpO2 96%   BMI 34.33 kg/m²   General appearance: not jaundiced or diaphoretic  HEENT: no swelling to lips or tongue   lips are averg size    Respiratory:  unlabored respiratory effort. Clear to auscultation, bilaterally no wheeze on auscultat  Cardiovascular: s1 s2 audible reg pulse , without murmurs  Abdomen: non-distended bowel sounds. present    Neurologic:  Awake and alert clear speech  Psychiatric: mood and affect match    Labs:   ,       ASSESSMENT:  dm2-appears well controlled  htn essent-bp in acceptable range  hld unspecif  ibs-d    Daily loose stools- baseline 4-5 loose stools per day      PLAN:  recomm caitlin bs tid before meals  Contin metformin  While patient is allowed to eat  Would be acceptable to contin efforts to keep bs between 110-179  monit for hypoglycemia  It would be reasonable to check bs before meals  Ideally rapid acting insulin to be given before meals or at start of meals   Contin meds for htn and hld  Patient may contin her ppi          Diet: ADULT DIET; Regular; 4 carb choices (60 gm/meal)  Thank you for allowing us to participate in this patient's care.    +++++++++++++++++++++++++++++++++++++++++++++++++  Lorri Corley DO  [address]  +++++++++++++++++++++++++++++++++++++++++++++++++  NOTE: This report was transcribed using voice recognition software. Every effort was made to ensure accuracy; however, inadvertent computerized transcription errors may be present.
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
## 2022-05-19 NOTE — PROGRESS NOTES
INTRAOPERATIVE MONITORING EMG REPORT    Diagnosis: Adjacent segment stenosis  Procedure: Exploration fusion L4-S1, PLIF L3-4   Anesthesia: Isoflurane  Surgeon: Yoly Garcia M.D. Intra-op Monitorin.25 hours    Procedure:     EMG recording electrodes were placed over the vastus medialis, vastus lateralis, anterior tibialis, and medial gastrocnemius *musles for recording spontaneous EMG activity. A silent control was performed to test the integrity of the system prior to the procedure. Results:  Spontaneous EMG: Activity from right side with cautery during decompression resolved in 30 seconds. At closing, all EMG leads were quiet.      Evoked EMG:   LEFT   Screw (mA)   L3               >30                                         L4               >30                                               RIGHT     L3               >30                                         L4               >30

## 2022-05-19 NOTE — H&P
I have examined the patient and reviewed the H and P and no changes are noted.   The right leg is worse    Nichola Kawasaki, MD

## 2022-05-20 LAB
ANION GAP SERPL CALCULATED.3IONS-SCNC: 9 MMOL/L (ref 7–16)
BUN BLDV-MCNC: 10 MG/DL (ref 6–20)
CALCIUM SERPL-MCNC: 7.4 MG/DL (ref 8.6–10.2)
CHLORIDE BLD-SCNC: 107 MMOL/L (ref 98–107)
CO2: 25 MMOL/L (ref 22–29)
CREAT SERPL-MCNC: 0.5 MG/DL (ref 0.5–1)
GFR AFRICAN AMERICAN: >60
GFR NON-AFRICAN AMERICAN: >60 ML/MIN/1.73
GLUCOSE BLD-MCNC: 145 MG/DL (ref 74–99)
HCT VFR BLD CALC: 31.1 % (ref 34–48)
HEMOGLOBIN: 10.1 G/DL (ref 11.5–15.5)
MCH RBC QN AUTO: 31.1 PG (ref 26–35)
MCHC RBC AUTO-ENTMCNC: 32.5 % (ref 32–34.5)
MCV RBC AUTO: 95.7 FL (ref 80–99.9)
METER GLUCOSE: 141 MG/DL (ref 74–99)
METER GLUCOSE: 141 MG/DL (ref 74–99)
METER GLUCOSE: 150 MG/DL (ref 74–99)
METER GLUCOSE: 162 MG/DL (ref 74–99)
PDW BLD-RTO: 13 FL (ref 11.5–15)
PLATELET # BLD: 238 E9/L (ref 130–450)
PMV BLD AUTO: 10.4 FL (ref 7–12)
POTASSIUM SERPL-SCNC: 3.6 MMOL/L (ref 3.5–5)
RBC # BLD: 3.25 E12/L (ref 3.5–5.5)
SODIUM BLD-SCNC: 141 MMOL/L (ref 132–146)
WBC # BLD: 11.9 E9/L (ref 4.5–11.5)

## 2022-05-20 PROCEDURE — 2580000003 HC RX 258: Performed by: NEUROLOGICAL SURGERY

## 2022-05-20 PROCEDURE — 85027 COMPLETE CBC AUTOMATED: CPT

## 2022-05-20 PROCEDURE — 1200000000 HC SEMI PRIVATE

## 2022-05-20 PROCEDURE — 82962 GLUCOSE BLOOD TEST: CPT

## 2022-05-20 PROCEDURE — 6360000002 HC RX W HCPCS: Performed by: NEUROLOGICAL SURGERY

## 2022-05-20 PROCEDURE — 36415 COLL VENOUS BLD VENIPUNCTURE: CPT

## 2022-05-20 PROCEDURE — 6370000000 HC RX 637 (ALT 250 FOR IP): Performed by: NEUROLOGICAL SURGERY

## 2022-05-20 PROCEDURE — 97165 OT EVAL LOW COMPLEX 30 MIN: CPT

## 2022-05-20 PROCEDURE — 97530 THERAPEUTIC ACTIVITIES: CPT

## 2022-05-20 PROCEDURE — 97161 PT EVAL LOW COMPLEX 20 MIN: CPT

## 2022-05-20 PROCEDURE — 97535 SELF CARE MNGMENT TRAINING: CPT

## 2022-05-20 PROCEDURE — 80048 BASIC METABOLIC PNL TOTAL CA: CPT

## 2022-05-20 RX ORDER — OXYCODONE HYDROCHLORIDE 5 MG/1
5 TABLET ORAL EVERY 4 HOURS PRN
Qty: 42 TABLET | Refills: 0 | Status: SHIPPED | OUTPATIENT
Start: 2022-05-20 | End: 2022-05-22

## 2022-05-20 RX ORDER — CYCLOBENZAPRINE HCL 10 MG
10 TABLET ORAL 3 TIMES DAILY PRN
Qty: 30 TABLET | Refills: 0 | Status: SHIPPED | OUTPATIENT
Start: 2022-05-20 | End: 2022-05-22

## 2022-05-20 RX ADMIN — METFORMIN HYDROCHLORIDE 1000 MG: 1000 TABLET ORAL at 09:36

## 2022-05-20 RX ADMIN — OXYCODONE HYDROCHLORIDE 10 MG: 10 TABLET ORAL at 21:39

## 2022-05-20 RX ADMIN — DICYCLOMINE HYDROCHLORIDE 10 MG: 10 CAPSULE ORAL at 21:38

## 2022-05-20 RX ADMIN — ACETAMINOPHEN 650 MG: 325 TABLET ORAL at 05:32

## 2022-05-20 RX ADMIN — OXYCODONE HYDROCHLORIDE 10 MG: 10 TABLET ORAL at 09:36

## 2022-05-20 RX ADMIN — DOCUSATE SODIUM 200 MG: 100 CAPSULE, LIQUID FILLED ORAL at 21:39

## 2022-05-20 RX ADMIN — CYCLOBENZAPRINE 10 MG: 10 TABLET, FILM COATED ORAL at 17:55

## 2022-05-20 RX ADMIN — SODIUM CHLORIDE, PRESERVATIVE FREE 10 ML: 5 INJECTION INTRAVENOUS at 21:39

## 2022-05-20 RX ADMIN — LISINOPRIL 10 MG: 20 TABLET ORAL at 09:35

## 2022-05-20 RX ADMIN — POLYETHYLENE GLYCOL 3350 17 G: 17 POWDER, FOR SOLUTION ORAL at 09:35

## 2022-05-20 RX ADMIN — MORPHINE SULFATE 2 MG: 2 INJECTION, SOLUTION INTRAMUSCULAR; INTRAVENOUS at 23:15

## 2022-05-20 RX ADMIN — ACETAMINOPHEN 650 MG: 325 TABLET ORAL at 17:55

## 2022-05-20 RX ADMIN — PANTOPRAZOLE SODIUM 40 MG: 40 TABLET, DELAYED RELEASE ORAL at 09:36

## 2022-05-20 RX ADMIN — DOCUSATE SODIUM 200 MG: 100 CAPSULE, LIQUID FILLED ORAL at 09:36

## 2022-05-20 RX ADMIN — DICYCLOMINE HYDROCHLORIDE 10 MG: 10 CAPSULE ORAL at 09:36

## 2022-05-20 RX ADMIN — ATORVASTATIN CALCIUM 10 MG: 20 TABLET, FILM COATED ORAL at 17:55

## 2022-05-20 RX ADMIN — SENNOSIDES AND DOCUSATE SODIUM 1 TABLET: 50; 8.6 TABLET ORAL at 21:39

## 2022-05-20 RX ADMIN — OXYCODONE HYDROCHLORIDE 10 MG: 10 TABLET ORAL at 01:25

## 2022-05-20 RX ADMIN — Medication 1 TABLET: at 09:35

## 2022-05-20 RX ADMIN — CYCLOBENZAPRINE 10 MG: 10 TABLET, FILM COATED ORAL at 09:36

## 2022-05-20 RX ADMIN — CYCLOBENZAPRINE 10 MG: 10 TABLET, FILM COATED ORAL at 01:24

## 2022-05-20 RX ADMIN — OXYCODONE HYDROCHLORIDE 10 MG: 10 TABLET ORAL at 14:35

## 2022-05-20 RX ADMIN — BISACODYL 5 MG: 5 TABLET, COATED ORAL at 09:36

## 2022-05-20 RX ADMIN — ACETAMINOPHEN 650 MG: 325 TABLET ORAL at 23:13

## 2022-05-20 RX ADMIN — METFORMIN HYDROCHLORIDE 1000 MG: 1000 TABLET ORAL at 17:55

## 2022-05-20 RX ADMIN — DULOXETINE HYDROCHLORIDE 60 MG: 60 CAPSULE, DELAYED RELEASE ORAL at 09:35

## 2022-05-20 RX ADMIN — SENNOSIDES AND DOCUSATE SODIUM 1 TABLET: 50; 8.6 TABLET ORAL at 09:36

## 2022-05-20 RX ADMIN — CEFAZOLIN 2000 MG: 2 INJECTION, POWDER, FOR SOLUTION INTRAMUSCULAR; INTRAVENOUS at 05:31

## 2022-05-20 RX ADMIN — CEFAZOLIN 2000 MG: 2 INJECTION, POWDER, FOR SOLUTION INTRAMUSCULAR; INTRAVENOUS at 14:35

## 2022-05-20 RX ADMIN — CEFAZOLIN 2000 MG: 2 INJECTION, POWDER, FOR SOLUTION INTRAMUSCULAR; INTRAVENOUS at 23:09

## 2022-05-20 ASSESSMENT — PAIN DESCRIPTION - DIRECTION
RADIATING_TOWARDS: BILATERAL THIGHS
RADIATING_TOWARDS: BILATERAL THIGHS

## 2022-05-20 ASSESSMENT — PAIN SCALES - GENERAL
PAINLEVEL_OUTOF10: 7
PAINLEVEL_OUTOF10: 7
PAINLEVEL_OUTOF10: 8
PAINLEVEL_OUTOF10: 3
PAINLEVEL_OUTOF10: 7
PAINLEVEL_OUTOF10: 4
PAINLEVEL_OUTOF10: 6
PAINLEVEL_OUTOF10: 6
PAINLEVEL_OUTOF10: 7

## 2022-05-20 ASSESSMENT — PAIN DESCRIPTION - PAIN TYPE
TYPE: SURGICAL PAIN
TYPE: SURGICAL PAIN

## 2022-05-20 ASSESSMENT — PAIN - FUNCTIONAL ASSESSMENT
PAIN_FUNCTIONAL_ASSESSMENT: PREVENTS OR INTERFERES SOME ACTIVE ACTIVITIES AND ADLS
PAIN_FUNCTIONAL_ASSESSMENT: PREVENTS OR INTERFERES SOME ACTIVE ACTIVITIES AND ADLS

## 2022-05-20 ASSESSMENT — PAIN DESCRIPTION - LOCATION
LOCATION: BACK
LOCATION: BACK

## 2022-05-20 ASSESSMENT — PAIN DESCRIPTION - ORIENTATION
ORIENTATION: MID;LOWER
ORIENTATION: MID;LOWER

## 2022-05-20 ASSESSMENT — PAIN DESCRIPTION - FREQUENCY
FREQUENCY: CONTINUOUS
FREQUENCY: CONTINUOUS

## 2022-05-20 ASSESSMENT — PAIN DESCRIPTION - ONSET
ONSET: ON-GOING
ONSET: ON-GOING

## 2022-05-20 ASSESSMENT — PAIN DESCRIPTION - DESCRIPTORS
DESCRIPTORS: STABBING;PRESSURE;DISCOMFORT
DESCRIPTORS: STABBING;PRESSURE;DISCOMFORT

## 2022-05-20 NOTE — CARE COORDINATION
PEDRITO met with patient in her room. She states she lives home with her  in a 1 floor mobile home, with 5 steps to get into the house. She uses no DME at home and was independent. She states she does have a cane if needed. Prior to admission she was driving. She does see a pain management Dr as well, Dr Trinity Pablo. Her PCP is Dr Lizeth King, pharmacy is [facility]. She states she has no history of HHC or KELY, she has done outpatient therapy in the past and she wants to do it again outpatient. She states she plans to go to Outpatient in [place]. She states her  is bringing her brace from home so she can participate in therapy today. Will follow, plan at discharge is home.

## 2022-05-20 NOTE — OP NOTE
[address]                  [address] [facility],  [facility]                                OPERATIVE REPORT    PATIENT NAME: Beth Velasquez                   :        1960  MED REC NO:   [id number]                            ROOM:       5219  ACCOUNT NO:   [de-identified]                           ADMIT DATE: 2022  PROVIDER:     Princess Nate MD    DATE OF PROCEDURE:  2022    PREOPERATIVE DIAGNOSES:  1. Prior L4 to S1 fusion. 2.  L3-L4 adjacent segment stenosis. POSTOPERATIVE DIAGNOSES:  1. Prior L4 to S1 fusion. 2.  L3-L4 adjacent segment stenosis. OPERATIVE PROCEDURES:  1. Exploration of prior L4 to S1 fusion. 2.  Removal of previously placed L5 and S1 pedicle screws. 3.  Bilateral segmental arthrodesis and fusion at L3-L4 with use of  bilateral L3 and L4 pedicle screws with use of locally harvested  autograft plus recombinant BMP-2 (INFUSE) posterolateral fusion at  L3-L4.  4.  360-degree fusion with posterior lumbar interbody fusion at L3-4  with use of bilateral Globus Sable expandable cages packed with  recombinant BMP-2 (INFUSE). 5.  Bilateral L3 and L4 laminectomy, bilateral L2 to L4 medial  facetectomy, bilateral L3 and L4 foraminotomy and bilateral L2-L4  diskectomy. 6.  Use of O-arm assisted navigation with placement of pedicle screws. 7.  Use of free-running EMG to test pedicle screw heads. 8.  Use of intraoperative fluoroscopy interpreted by myself, the  surgeon. 9.  AS modifier for Sj Ríos PA-C, who assisted with primary  exposure and primary closure and retraction of neural elements. ANESTHESIA:  Generalized endotracheal anesthesia. SURGEON:  Princess Nate MD.    ASSISTANTJada Palomo, [facility]    COMPLICATIONS:  None. ESTIMATED BLOOD LOSS:  250 mL. SPECIMEN:  Disk. OPERATIVE INDICATIONS:  The patient is a 72-year-old lady who is known  to my service.   She had previously undergone L4 to S1 fusion. She  presented with worsening back pain. She was found to have adjacent  segment disease at L3-L4. She had failed conservative therapy and after  risks, benefits and alternatives were discussed with the patient, it was  determined that she would undergo the above-listed procedure. Of note,  Sasha Phillips PA-C's services were required as she was the only  qualified assistant to assist with primary exposure and primary closure. OPERATIVE PROCEDURE:  The patient was brought into the operating room. A time-out was performed, where she was identified by her name, medical  record number and the operative procedure, which she was about to  undergo. Next, induction of generalized endotracheal anesthesia was  then commenced. Upon completion of induction of generalized  endotracheal anesthesia, she received preoperative antibiotics. Guerrero  catheter was placed. Monitoring electrodes were placed into the muscle  groups in her lower extremity for free-running EMG testing. She was  then flipped into prone position on a Esvin table. All pressure  points were padded. Her lumbosacral region was prepped and draped in  the usual sterile fashion. After this was done, #10 blade was used to  open up her prior lumbar incision. Monopolar cautery was used to  dissect through the subcutaneous tissue. I placed a self-retaining  Weitlaner retractor directly into the wound. Next, I opened up the  lumbodorsal fascia sharply with monopolar cautery and exposed what was  left of the spinous processes at L3 and L4. I then proceeded to carry  the dissection out laterally to expose the previously placed pedicle  screws at L4-5 and S1. I exposed the fusion mass bilaterally. She  appeared to have a solid fusion. I then proceeded to then remove the  previously placed pedicle screws at L4-5 and S1. I then attached the  O-arm reference frame to the L4 spinous process.   Using O-arm assisted  navigation, I placed bilateral L2 and L4 pedicle screws. I performed  another O-arm spin that showed that screws were within the pedicles. I  used a handheld stimulator to test pedicle screw heads. There was no  evidence of pedicle breach. I then removed the O-arm from the field. I  placed self-retaining angled cerebellar retractors into the wound. I  used a Leksell rongeur to bite up the spinous process at L3 and L4. I  used a high-speed bur to thin out the lamina bilaterally at L3 and L4. I then used #4 Kerrison punch to start my central decompression. I  performed bilateral L3 and L4 laminectomy. I then subsequently used #3  Kerrison punch to perform a bilateral L3-4 medial facetectomy and  bilateral L3 and L4 foraminotomy. Next, I identified the L3-4 disk  space on the left, retracted thecal sac medially, performed an  annulotomy with #11-blade. I removed disk material with pituitary  rongeurs and curettes. I then prepared both the superior and inferior  end plates. I then inserted #8 shaver that was rotated 360 degrees and  after this was done, I then proceeded to prepare both the superior and  inferior end plates. I then subsequently placed a #7 Globus Sable  expandable cage packed with recombinant BMP-2 (INFUSE). I then went  over the patient's right side, identified the L3-4 disk space, performed  an annulotomy with an 11-blade. I removed disk material with pituitary  rongeurs and curettes. I prepared both the superior and inferior  endplates. I then proceeded to then insert a #8 shaver that was rotated  360 degrees. After this was done, I placed a #7 Globus Sable expandable  cage packed with recombinant BMP-2 (INFUSE). I then used intraoperative  fluoroscopy to inspect the construct, it appeared sound. I placed rods  into the screw heads. I locked the rods now using locking caps in a  torque/counter-torque mechanism.   I used high-speed bur to decorticate  the transverse processes bilaterally at L3 and the fusion mass at L4. I  irrigated the wound copiously with antibiotic-impregnated saline. I  then laid out my bone grafting material in lateral gutters, which  consisted of recombinant BMP-2 (INFUSE) plus locally harvested autograft  after obtaining adequate hemostasis with monopolar and bipolar cautery. Hemovac drain was placed into the wound. I then proceeded to close the  wound in layers using 0 Vicryl for the fascia, 2-0 Vicryl for the  subcutaneous layer and staples for the skin. A dry sterile dressing was  placed over this. The patient was then flipped in supine position on  her hospital bed, was extubated and transported to the postanesthesia  care unit in stable condition. There were no complications. Counts  were correct. I was present for the entire case. Please note Aram Elaine, [facility]  was the only qualified assistant to  assist with primary exposure and primary closure.         Sukhdev Orantes MD    D: 05/19/2022 20:26:27       T: 05/19/2022 20:29:34     [id number]  Job#: [id number]     Doc#: [id number]    CC:

## 2022-05-20 NOTE — PROGRESS NOTES
Physical Therapy  Physical Therapy Attempt    Name: Narda Fabian  : 1960  MRN: [id number]      Date of Service: 2022  Chart reviewed. Attempted initial evaluation at this time; however, LSO was not present in room. Pt's  to bring in LSO this morning. Will re-attempt as able.     Robby Degroot, PT, DPT  [id number]

## 2022-05-20 NOTE — PROGRESS NOTES
Department of Neurosurgery  Progress Note    CHIEF COMPLAINT: s/p L3-L4 PLIF 5/19    SUBJECTIVE:  Donnell op site pain well. Family bringing LSO today    REVIEW OF SYSTEMS :  Constitutional: Negative for chills and fever. Neurological: Negative for dizziness, tremors and speech change.      OBJECTIVE:   VITALS:  /80   Pulse 98   Temp 98.8 °F (37.1 °C) (Temporal)   Resp 16   Ht 5' 4\" (1.626 m)   Wt 200 lb (90.7 kg)   SpO2 95%   BMI 34.33 kg/m²     PHYSICAL:  Neurologic: Alert and oriented x3; PERRL  Motor Exam:  Motor exam is symmetrical 5 out of 5 all extremities bilaterally  Sensory:  Sensory intact  Incision c/d/i      DATA:  CBC:   Lab Results   Component Value Date    WBC 11.9 05/20/2022    RBC 3.25 05/20/2022    HGB 10.1 05/20/2022    HCT 31.1 05/20/2022    MCV 95.7 05/20/2022    MCH 31.1 05/20/2022    MCHC 32.5 05/20/2022    RDW 13.0 05/20/2022     05/20/2022    MPV 10.4 05/20/2022     BMP:    Lab Results   Component Value Date     05/20/2022    K 3.6 05/20/2022    K 3.9 05/13/2022     05/20/2022    CO2 25 05/20/2022    BUN 10 05/20/2022    LABALBU 3.7 10/04/2019    CREATININE 0.5 05/20/2022    CALCIUM 7.4 05/20/2022    GFRAA >60 05/20/2022    LABGLOM >60 05/20/2022    GLUCOSE 145 05/20/2022     PT/INR:    Lab Results   Component Value Date    PROTIME 10.6 05/13/2022    INR 1.0 05/13/2022     PTT:  No results found for: APTT, PTT[APTT}    Current Inpatient Medications  Current Facility-Administered Medications: atorvastatin (LIPITOR) tablet 10 mg, 10 mg, Oral, QPM  dicyclomine (BENTYL) capsule 10 mg, 10 mg, Oral, BID  DULoxetine (CYMBALTA) extended release capsule 60 mg, 60 mg, Oral, Daily  metFORMIN (GLUCOPHAGE) tablet 1,000 mg, 1,000 mg, Oral, BID WC  multivitamin 1 tablet, 1 tablet, Oral, Daily  sodium chloride flush 0.9 % injection 5-40 mL, 5-40 mL, IntraVENous, 2 times per day  sodium chloride flush 0.9 % injection 5-40 mL, 5-40 mL, IntraVENous, PRN  0.9 % sodium chloride infusion, , IntraVENous, PRN  acetaminophen (TYLENOL) tablet 650 mg, 650 mg, Oral, 4 times per day  ondansetron (ZOFRAN-ODT) disintegrating tablet 4 mg, 4 mg, Oral, Q8H PRN **OR** ondansetron (ZOFRAN) injection 4 mg, 4 mg, IntraVENous, Q6H PRN  0.9 % sodium chloride infusion, , IntraVENous, Continuous  ceFAZolin (ANCEF) 2,000 mg in sterile water 20 mL IV syringe, 2,000 mg, IntraVENous, Q8H  oxyCODONE (ROXICODONE) immediate release tablet 5 mg, 5 mg, Oral, Q4H PRN **OR** oxyCODONE HCl (OXY-IR) immediate release tablet 10 mg, 10 mg, Oral, Q4H PRN  morphine (PF) injection 2 mg, 2 mg, IntraVENous, Q2H PRN **OR** morphine sulfate (PF) injection 4 mg, 4 mg, IntraVENous, Q2H PRN  cyclobenzaprine (FLEXERIL) tablet 10 mg, 10 mg, Oral, TID PRN  diphenhydrAMINE (BENADRYL) tablet 25 mg, 25 mg, Oral, Q6H PRN **OR** diphenhydrAMINE (BENADRYL) injection 25 mg, 25 mg, IntraVENous, Q6H PRN  polyethylene glycol (GLYCOLAX) packet 17 g, 17 g, Oral, Daily  bisacodyl (DULCOLAX) EC tablet 5 mg, 5 mg, Oral, Daily  sennosides-docusate sodium (SENOKOT-S) 8.6-50 MG tablet 1 tablet, 1 tablet, Oral, BID  bisacodyl (DULCOLAX) suppository 10 mg, 10 mg, Rectal, Daily PRN  fleet rectal enema 1 enema, 1 enema, Rectal, Daily PRN  benzocaine-menthol (CEPACOL SORE THROAT) lozenge 1 lozenge, 1 lozenge, Oral, Q2H PRN  docusate sodium (COLACE) capsule 200 mg, 200 mg, Oral, BID  glucose chewable tablet 16 g, 4 tablet, Oral, PRN  glucagon (rDNA) injection 1 mg, 1 mg, IntraMUSCular, PRN  dextrose 5 % solution, 100 mL/hr, IntraVENous, PRN  dextrose 50 % IV solution, 12.5 g, IntraVENous, PRN  lisinopril (PRINIVIL;ZESTRIL) tablet 10 mg, 10 mg, Oral, Daily  pantoprazole (PROTONIX) tablet 40 mg, 40 mg, Oral, QAM AC  insulin lispro (HUMALOG) injection vial 0-6 Units, 0-6 Units, SubCUTAneous, TID WC    ASSESSMENT:   s/p L3-L4 PLIF 5/19    PLAN:  -Pain control  -PT/OT with LSO  -Drain care. Plan to remove Sunday.   -PMR consult  -Follow up in neurosurgery clinic in 2 weeks for staple removal and in 4 weeks with x-rays      Electronically signed by Eddie Enriquez PA-C on 5/20/2022 at 8:47 AM

## 2022-05-20 NOTE — PROGRESS NOTES
[]Cognition    [x] Functional transfers   [x] IADLs         [x] Safety Awareness   [x]Endurance    [] Fine Coordination              [x] Balance      [] Vision/perception   []Sensation     []Gross Motor Coordination  [] ROM  [] Delirium                   [] Motor Control     OT PLAN OF CARE   OT POC based on physician orders, patient diagnosis and results of clinical assessment    Frequency/Duration 3-5 days/wk for 2 weeks PRN   Specific OT Treatment Interventions to include:   * Instruction/training on adapted ADL techniques and AE recommendations to increase functional independence within precautions       * Training on energy conservation strategies, correct breathing pattern and techniques to improve independence/tolerance for self-care routine  * Functional transfer/mobility training/DME recommendations for increased independence, safety, and fall prevention  * Patient/Family education to increase follow through with safety techniques and functional independence  * Recommendation of environmental modifications for increased safety with functional transfers/mobility and ADLs  * Therapeutic exercise to improve motor endurance, ROM, and functional strength for ADLs/functional transfers  * Therapeutic activities to facilitate/challenge dynamic balance, stand tolerance for increased safety and independence with ADLs  * Positioning to improve skin integrity, interaction with environment and functional independence    Home Living: Pt lives with  in trailor with 5 steps & 2 handrails to enter.     Bathroom setup: Tub/shower   Equipment owned: [address] [place] [address] [facility]    Prior Level of Function: assist (LB dressing & toileting) with ADLs , shared IADLs between pt & ; engaged in functional mobility without use of AD  Driving: Yes  Occupation: None reported    Pain Level: Pt with no c/o pain throughout duration of session  Cognition: A&O: 4/4; Follows 2 step directions   Memory:  Good   Sequencing:  Fair+   Problem solving: Fair+   Judgement/safety:  Fair     Functional Assessment:  AM-PAC Daily Activity Raw Score: 15/24   Initial Eval Status  Date: 5/20/22 Treatment Status  Date: STGs = LTGs  Time frame: 10-14 days   Feeding Independent      Grooming Minimal Assist  Independent    UB Dressing Maximal Assist to don LSO brace at bed-level  Independent   LB Dressing Maximal Assist   Independent   Bathing Moderate Assist  Independent    Toileting Moderate Assist   Independnet   Bed Mobility  Log Roll: Minimal Assist  Supine to sit: Moderate Assist   Sit to supine:NBT   Supine to sit: Independent   Sit to supine: Independent    Functional Transfers Sit to stand:Minimal Assist   Stand to sit:Minimal Assist  Stand pivot: Minimal Assist  Commode: NT  Sit to stand:Independent  Stand to sit: Independent  Stand pivot: Independent  Commode: Indepdent    Functional Mobility Minimal Assist  No use of AD shorter than household distance  Independent    Balance Sitting:     Static - SBA     Dynamic - Minimal Assist  Standing: Minimal Assist  Sitting:     Static: Independent     Dynamic: Independent  Standing: Independent   Activity Tolerance Fair  Good   Visual/  Perceptual Glasses: Yes  Appears WFL         Safety Fair  Good  during ADL completion following spinal precautions     Hand Dominance Right   AROM (PROM) Strength Additional Info:  Goal:   RUE  WFL 4-/5 grossly tested good  and wfl FMC/dexterity noted during ADL tasks   Improve overall RUE strength WFL for participation in functional tasks       LUE WFL 4-/5 grossly tested Good  and wfl FMC/dexterity noted during ADL tasks   Improve overall LUE strength WFL for participation in functional tasks       Hearing: [facility]  Sensation:  No c/o numbness or tingling BUE  Tone: WFL BUE  Edema: Unremarkable    Comment: Cleared by RN to see pt. Upon arrival patient supine in bed and agreeable to OT session with  in room.  At end of session, patient seated EOB with call light and phone within reach, all lines and tubes intact. Overall patient demonstrated decreased independence and safety during completion of ADL/functional transfer/mobility tasks. Therapist facilitated ADL tasks, functional transfers, functional mobility, bed mobility to promote safety awareness, spinal neutrality, & engagement throughout functional tasks. Pt & family educated on spinal precautions, don/doff of LSO brace, & activity modifications/adaptations to promote spinal neutrality, safety awareness, & engagement throughout daily activities. Pt would benefit from continued skilled OT to increase safety and independence with completion of ADL/IADL tasks for functional independence and quality of life. Treatment: OT treatment provided this date includes:    ADL-  Instruction/training on safety and adapted techniques for completion of ADLs: Pt required assist & education on donning LSO brace at bed-level. Pt sat EOB to don/doff socks - pt unable to complete figure-4 technique at this time. Pt educated on LB dressing techniques to promote spinal neutrality & independence. Pt stood to brush teeth with 3 verbal cues to maintain spinal precautions. Pt educated on activity modifications/adaptatios to promote spinal precautions, safety awareness, & independence throughout ADLs.   Mobility-  Instruction/training on safety and improved independence with bed mobility/functional transfers and functional mobility. Pt completed functional mobility from bed to bedside chair with no use of AD. Pt required cuing/assist to maintain proper body mechanics & safe transfer progression throughout session.   Sitting EOB ~10 minutes to improve dynamic sitting balance and activity tolerance during ADLs.   Skilled positioning/alignment-  Proper Positioning/Alignment.  Pt required assist/cuing to maintain spinal precautions throughout duration of session to promote skin/joitn integrity, safety awareness, & engagement throughout functional tasks.    Rehab Potential: Good for established goals     LTG: maximize independence with ADLs to return to PLOF    Patient and/or family were instructed on functional diagnosis, prognosis/goals and OT plan of care. Demonstrated fair understanding. Eval Complexity: Low  · History: Expanded chart review of medical records and additional review of physical, cognitive, or psychosocial history related to current functional performance  · Exam: 3+ performance deficits  · Assistance/Modification: Min/mod assistance or modifications required to perform tasks. May have comorbidities that affect occupational performance. Evaluation time includes thorough review of current medical information, gathering information on past medical & social history & PLOF, completion of standardized testing, informal observation of tasks, consultation with other medical professions/disciplines, assessment of data & development of POC/goals. Time In: 10:30a  Time Out: 10:55a  Total Treatment Time: 10 minutes    Min Units   OT Eval Low 17064  x     OT Eval Medium 82028      OT Eval High 19550      OT Re-Eval [id number]       Therapeutic Ex 77810       Therapeutic Activities 02029       ADL/Self Care 06617  10 1    Orthotic Management 19974       Manual [id number]     Neuro Re-Ed 41285       Non-Billable Time          Evaluation Time additionally includes thorough review of current medical information, gathering information on past medical history/social history and prior level of function, interpretation of standardized testing/informal observation of tasks, assessment of data and development of plan of care and goals.             Js Ying OTR/L; [id number]

## 2022-05-20 NOTE — PROGRESS NOTES
Physical Therapy  Physical Therapy Initial Assessment     Name: Juhi Morgan  : 1960  MRN: [id number]      Date of Service: 2022    Evaluating PT:  Norah Benítez PT, DPT [id number]    Room #:  8566/0273-D  Diagnosis:  Lumbar adjacent segment disease with spondylolisthesis [M51.36, M43.16]  PMHx/PSHx:  Arthritis, depression, DM, HTN, IBS, sleep apnea  Procedure/Surgery:  EXPLORATION OF L4-S1 FUSION, L3-L4 POSTERIOR LUMBAR INTERBODY FUSION   Precautions:  Falls, LSO, spinal  Equipment Needs:  Patient needs front wheeled walker    SUBJECTIVE:    Pt lives with  in a mobile home with 6 ORIANA and 2 rails. Bed is on 1st floor and bath is on 1st floor. Pt ambulated with cane as needed PTA. OBJECTIVE:   Initial Evaluation  Date: 22 Treatment Short Term/ Long Term   Goals   AM-PAC 6 Clicks 63/43     Was pt agreeable to Eval/treatment? yes     Does pt have pain? /10 back pain     Bed Mobility  Rolling: Jesús  Supine to sit: Jesús  Sit to supine: Jesús  Scooting: Jesús  Rolling: Independent   Supine to sit: Independent   Sit to supine: Independent   Scooting: Independent    Transfers Sit to stand: SBA  Stand to sit: SBA  Stand pivot: SBA with [employer]  Sit to stand: Independent   Stand to sit: Independent   Stand pivot: Mod I with [employer]   Ambulation    30 feet with [employer] SBA  >100 feet with [employer] Mod I   Stair negotiation: ascended and descended  NT  6 steps with 2 rail Mod I   ROM BUE:  Defer to OT note  BLE:  WFL     Strength BUE:  Defer to OT note  BLE:  Grossly 3+/5  Improve by one MMT grade   Balance Sitting EOB:  SBA  Dynamic Standing:  SBA with [employer]  Sitting EOB:  Independent   Dynamic Standing:   Mod I with [employer]     Pt is A & O x 4  Sensation:  Denies abnormalities  Edema:  None noted    Vitals:  HR after activity: 135bpm  SPO2 WNL throughout  Patient education  Pt educated on role of PT, spinal precautions, safety during mobility    Patient response to education:   Pt verbalized understanding Pt demonstrated skill Pt requires further education in this area   yes yes yes     ASSESSMENT:    Conditions Requiring Skilled Therapeutic Intervention:    [x]Decreased strength     [x]Decreased ROM  [x]Decreased functional mobility  [x]Decreased balance   []Decreased endurance   []Decreased posture  []Decreased sensation  []Decreased coordination   []Decreased vision  []Decreased safety awareness   [x]Increased pain       Comments:  Patient semi-supine in bed upon entry and agreeable to PT evaluation. Patient able to complete rolling to donne LSO with light assist. Supine>sit with light assist to trunk. Mild dizziness at EOB that resolved with seated rest. oatient able to stand and ambulate short distance in room with [employer]. SOB with activity that resolved with seated rest. Patient agreeable to sit in bedside chair. Stand pivot to chair completed with no hands on assist. Patient left sitting in chair at end of session with all needs met. Patient to benefit from continued skilled PT at [place] to improve functional mobility and decrease fall risk. Treatment:  Patient practiced and was instructed in the following treatment:     Bed Mobility: VCs provided for sequencing and safety during mobility. Manual assist provided for completion of task.  Transfer Training: Verbal and tactile cueing provided for sequencing and safety during mobility.  Gait Training: Ambulation with [employer] and verbal cues for proper technique and safety.  Patient Education: Education provided on spinal precautions, safety during mobility    Pt's/ family goals   1. None stated    Prognosis is good for reaching above PT goals. Patient and or family understand(s) diagnosis, prognosis, and plan of care.   yes    PHYSICAL THERAPY PLAN OF CARE:    PT POC is established based on physician order and patient diagnosis     Referring provider/PT Order:    05/19/22 1530  PT eval and treat  Start:  05/19/22 1530,   End:  05/19/22 1530,   ONE TIME, Standing Count:  1 Occurrences,   R         Montse Gaffney MD       Diagnosis:  Lumbar adjacent segment disease with spondylolisthesis [M51.36, M43.16]  Specific instructions for next treatment:  Progress mobility as appropriate    Current Treatment Recommendations:     [x] Strengthening to improve independence with functional mobility   [x] ROM to improve independence with functional mobility   [x] Balance Training to improve static/dynamic balance and to reduce fall risk  [x] Endurance Training to improve activity tolerance during functional mobility   [x] Transfer Training to improve safety and independence with all functional transfers   [x] Gait Training to improve gait mechanics, endurance and assess need for appropriate assistive device  [x] Stair Training in preparation for safe discharge home and/or into the community   [x] Positioning to prevent skin breakdown and contractures  [x] Safety and Education Training   [x] Patient/Caregiver Education   [x] HEP  [x] Other     PT long term treatment goals are located in above grid    Frequency of treatments: 2-5x/week x 1-2 weeks. Time in  1540  Time out  1600    Total Treatment Time  10 minutes     Evaluation Time includes thorough review of current medical information, gathering information on past medical history/social history and prior level of function, completion of standardized testing/informal observation of tasks, assessment of data and education on plan of care and goals.     CPT codes:  [x] Low Complexity PT evaluation 86958  [] Moderate Complexity PT evaluation 18358  [] High Complexity PT evaluation 71416  [] PT Re-evaluation 97261  [] Gait training 33401 - minutes  [] Manual therapy 50152 - minutes  [x] Therapeutic activities 65192 10 minutes  [] Therapeutic exercises 31634 - minutes  [] Neuromuscular reeducation 57131 - minutes     Metairie Quiet PT, DPT  [id number]

## 2022-05-20 NOTE — PROGRESS NOTES
200 mg Oral BID    lisinopril  10 mg Oral Daily    pantoprazole  40 mg Oral QAM AC    insulin lispro  0-6 Units SubCUTAneous TID WC     PRN Meds: sodium chloride flush, sodium chloride, ondansetron **OR** ondansetron, oxyCODONE **OR** oxyCODONE, morphine **OR** morphine, cyclobenzaprine, diphenhydrAMINE **OR** diphenhydrAMINE, bisacodyl, fleet, benzocaine-menthol, glucose, glucagon (rDNA), dextrose, dextrose    Labs:     Recent Labs     05/20/22 0613   WBC 11.9*   HGB 10.1*   HCT 31.1*          Recent Labs     05/20/22 0613      K 3.6      CO2 25   BUN 10   CREATININE 0.5   CALCIUM 7.4*           Chronic labs:    Lab Results   Component Value Date    INR 1.0 05/13/2022       Radiology:+++++++++++++++++++++++++++++++++++++++++++++++++  DO Yovani Greene Physician - [address] [place], [place]  +++++++++++++++++++++++++++++++++++++++++++++++++  NOTE: This report was transcribed using voice recognition software. Every effort was made to ensure accuracy; however, inadvertent computerized transcription errors may be present.

## 2022-05-20 NOTE — CONSULTS
[facility]                  [address] [place],  [facility]                                  CONSULTATION    PATIENT NAME: Stella Manriquez                   :        1960  MED REC NO:   [id number]                            ROOM:       5219  ACCOUNT NO:   [de-identified]                           ADMIT DATE: 2022  PROVIDER:     Patricia Edwards MD    CONSULT DATE:  2022    REHAB CONSULT    CHIEF COMPLAINT:  Chronic back pain. HISTORY OF PRESENT ILLNESS:  The patient has a longstanding history of  chronic back pain with failed conservative treatment. She was admitted  to [address] [place] [address] on 2022 and underwent L4 to S1 decompression  and fusion. She tolerated the surgery well. Postoperatively, she has  been seen by PT, but not yet by OT. She did not have a brace present,  so that really could not do a detailed eval.  She is up in the chair,  but I was asked to see her in terms of planning further rehab and doing  further evaluation. PAST MEDICAL HISTORY:  1. Chronic back pain. 2.  Obesity. 3.  Type 2 diabetes mellitus. 4.  Hypertension. ALLERGIES:  None known. CURRENT MEDICATIONS:  Tylenol, Lipitor, Dulcolax, Ancef, Bentyl, Colace,  Cymbalta, sliding scale insulin, Prinivil, Glucophage, Protonix and  oxycodone as needed for pain. SOCIAL HISTORY:  She lives with her . REVIEW OF SYSTEMS:  HEENT:  Negative for prior HEENT problems. CARDIAC/PULMONARY: Negative for prior cardiac or pulmonary problems. GI:  Positive for reflux. :  Negative. ORTHOPEDIC:  Positive for the  back pain. NEUROLOGIC:  Negative. PHYSICAL EXAMINATION:  GENERAL:  Obese white female in no acute distress. HEENT:  Head normocephalic, atraumatic. Eyes:  Pupils equal, round,  reactive to light. Pharynx normal.  LUNGS:  Clear to P and A. HEART:  Regular rate and rhythm. S1, S2 normal.  No murmurs or gallops.   ABDOMEN:  Bowel sounds normal.  Soft, nontender. No masses or  organomegaly. EXTREMITIES:  Without clubbing, cyanosis or edema. MENTAL STATUS:  Alert and oriented. NEUROMUSCULAR:  4+/5 strength throughout both lowers. Sensation is  intact. PROBLEM LIST:  1. Spinal stenosis. 2.  Status post lumbar decompression and fusion. 3.  Hypertension. 4.  Type 2 diabetes mellitus. 5.  Obesity. RECOMMENDATIONS:  The patient seems to be moving pretty well. She just  received her brace, and she is up in the chair with it right now. Therapy will go ahead and complete their evals. Based on what I am  seeing at the bedside exam, I think there is good potential that she  will be able to return home functioning at a walker level enough that  she can continue to get therapy in the home setting. We will see how  she actually does when therapy gets her up, but I think she has got  potential to be at that level.         Emily Mota MD    D: 05/20/2022 11:44:39       T: 05/20/2022 11:48:14     [email or web address]  Job#: [id number]     Doc#: [id number]    CC:

## 2022-05-21 LAB
METER GLUCOSE: 128 MG/DL (ref 74–99)
METER GLUCOSE: 128 MG/DL (ref 74–99)
METER GLUCOSE: 144 MG/DL (ref 74–99)
METER GLUCOSE: 154 MG/DL (ref 74–99)

## 2022-05-21 PROCEDURE — 1200000000 HC SEMI PRIVATE

## 2022-05-21 PROCEDURE — 82962 GLUCOSE BLOOD TEST: CPT

## 2022-05-21 PROCEDURE — 99024 POSTOP FOLLOW-UP VISIT: CPT | Performed by: NEUROLOGICAL SURGERY

## 2022-05-21 PROCEDURE — 6360000002 HC RX W HCPCS: Performed by: NEUROLOGICAL SURGERY

## 2022-05-21 PROCEDURE — 6370000000 HC RX 637 (ALT 250 FOR IP): Performed by: NEUROLOGICAL SURGERY

## 2022-05-21 PROCEDURE — 2580000003 HC RX 258: Performed by: NEUROLOGICAL SURGERY

## 2022-05-21 RX ADMIN — ATORVASTATIN CALCIUM 10 MG: 20 TABLET, FILM COATED ORAL at 17:55

## 2022-05-21 RX ADMIN — DICYCLOMINE HYDROCHLORIDE 10 MG: 10 CAPSULE ORAL at 10:54

## 2022-05-21 RX ADMIN — OXYCODONE HYDROCHLORIDE 10 MG: 10 TABLET ORAL at 03:31

## 2022-05-21 RX ADMIN — ACETAMINOPHEN 650 MG: 325 TABLET ORAL at 17:55

## 2022-05-21 RX ADMIN — BISACODYL 10 MG: 10 SUPPOSITORY RECTAL at 15:30

## 2022-05-21 RX ADMIN — Medication 1 TABLET: at 10:53

## 2022-05-21 RX ADMIN — DOCUSATE SODIUM 200 MG: 100 CAPSULE, LIQUID FILLED ORAL at 21:13

## 2022-05-21 RX ADMIN — PANTOPRAZOLE SODIUM 40 MG: 40 TABLET, DELAYED RELEASE ORAL at 07:01

## 2022-05-21 RX ADMIN — SODIUM CHLORIDE, PRESERVATIVE FREE 10 ML: 5 INJECTION INTRAVENOUS at 21:14

## 2022-05-21 RX ADMIN — SODIUM CHLORIDE, PRESERVATIVE FREE 10 ML: 5 INJECTION INTRAVENOUS at 10:56

## 2022-05-21 RX ADMIN — DULOXETINE HYDROCHLORIDE 60 MG: 60 CAPSULE, DELAYED RELEASE ORAL at 10:53

## 2022-05-21 RX ADMIN — DOCUSATE SODIUM 200 MG: 100 CAPSULE, LIQUID FILLED ORAL at 10:55

## 2022-05-21 RX ADMIN — METFORMIN HYDROCHLORIDE 1000 MG: 1000 TABLET ORAL at 09:19

## 2022-05-21 RX ADMIN — CEFAZOLIN 2000 MG: 2 INJECTION, POWDER, FOR SOLUTION INTRAMUSCULAR; INTRAVENOUS at 07:01

## 2022-05-21 RX ADMIN — ACETAMINOPHEN 650 MG: 325 TABLET ORAL at 12:17

## 2022-05-21 RX ADMIN — SENNOSIDES AND DOCUSATE SODIUM 1 TABLET: 50; 8.6 TABLET ORAL at 21:20

## 2022-05-21 RX ADMIN — OXYCODONE HYDROCHLORIDE 10 MG: 10 TABLET ORAL at 21:13

## 2022-05-21 RX ADMIN — DICYCLOMINE HYDROCHLORIDE 10 MG: 10 CAPSULE ORAL at 21:14

## 2022-05-21 RX ADMIN — CYCLOBENZAPRINE 10 MG: 10 TABLET, FILM COATED ORAL at 21:13

## 2022-05-21 RX ADMIN — METFORMIN HYDROCHLORIDE 1000 MG: 1000 TABLET ORAL at 17:55

## 2022-05-21 RX ADMIN — LISINOPRIL 10 MG: 20 TABLET ORAL at 10:53

## 2022-05-21 RX ADMIN — OXYCODONE HYDROCHLORIDE 10 MG: 10 TABLET ORAL at 10:55

## 2022-05-21 RX ADMIN — CYCLOBENZAPRINE 10 MG: 10 TABLET, FILM COATED ORAL at 03:31

## 2022-05-21 RX ADMIN — BISACODYL 5 MG: 5 TABLET, COATED ORAL at 10:55

## 2022-05-21 RX ADMIN — SENNOSIDES AND DOCUSATE SODIUM 1 TABLET: 50; 8.6 TABLET ORAL at 10:55

## 2022-05-21 RX ADMIN — ACETAMINOPHEN 650 MG: 325 TABLET ORAL at 07:01

## 2022-05-21 RX ADMIN — CEFAZOLIN 2000 MG: 2 INJECTION, POWDER, FOR SOLUTION INTRAMUSCULAR; INTRAVENOUS at 15:46

## 2022-05-21 RX ADMIN — POLYETHYLENE GLYCOL 3350 17 G: 17 POWDER, FOR SOLUTION ORAL at 10:54

## 2022-05-21 RX ADMIN — CYCLOBENZAPRINE 10 MG: 10 TABLET, FILM COATED ORAL at 12:17

## 2022-05-21 ASSESSMENT — PAIN SCALES - GENERAL
PAINLEVEL_OUTOF10: 4
PAINLEVEL_OUTOF10: 5
PAINLEVEL_OUTOF10: 3
PAINLEVEL_OUTOF10: 6
PAINLEVEL_OUTOF10: 5
PAINLEVEL_OUTOF10: 2
PAINLEVEL_OUTOF10: 5
PAINLEVEL_OUTOF10: 7
PAINLEVEL_OUTOF10: 5
PAINLEVEL_OUTOF10: 7

## 2022-05-21 ASSESSMENT — PAIN DESCRIPTION - DIRECTION
RADIATING_TOWARDS: BILATERAL THIGHS
RADIATING_TOWARDS: BILATERAL THIGHS

## 2022-05-21 ASSESSMENT — PAIN DESCRIPTION - DESCRIPTORS
DESCRIPTORS: DISCOMFORT;DULL;ACHING
DESCRIPTORS: DISCOMFORT;DULL;ACHING
DESCRIPTORS: STABBING;PRESSURE;DISCOMFORT
DESCRIPTORS: STABBING;PRESSURE;DISCOMFORT
DESCRIPTORS: DISCOMFORT;ACHING;SORE

## 2022-05-21 ASSESSMENT — PAIN DESCRIPTION - ONSET
ONSET: ON-GOING

## 2022-05-21 ASSESSMENT — PAIN DESCRIPTION - ORIENTATION
ORIENTATION: LOWER;POSTERIOR
ORIENTATION: MID;LOWER
ORIENTATION: MID;LOWER
ORIENTATION: LOWER;POSTERIOR
ORIENTATION: LOWER;POSTERIOR

## 2022-05-21 ASSESSMENT — PAIN DESCRIPTION - FREQUENCY
FREQUENCY: CONTINUOUS

## 2022-05-21 ASSESSMENT — PAIN DESCRIPTION - PAIN TYPE
TYPE: SURGICAL PAIN

## 2022-05-21 ASSESSMENT — PAIN DESCRIPTION - LOCATION
LOCATION: BACK

## 2022-05-21 NOTE — PROGRESS NOTES
Department of Neurosurgery  Progress Note    CHIEF COMPLAINT:  POD #2 s/p L3-L4     SUBJECTIVE:  Donnell op site pain well. OOB eating breakfast.  +passing gas but no BM yet. REVIEW OF SYSTEMS :  Constitutional: Negative for chills and fever. Neurological: Negative for dizziness, tremors and speech change. OBJECTIVE:   VITALS:  /69   Pulse 116   Temp 97.9 °F (36.6 °C) (Temporal)   Resp 27   Ht 5' 4\" (1.626 m)   Wt 200 lb (90.7 kg)   SpO2 96%   BMI 34.33 kg/m²     PHYSICAL:  AAO x4, rationally conversant. Marnell Dangelo   Speech clear  Face symmetric GI/FT  Tongue MDL  SS symm and strong  CAAL-C, preserved power  Incision: c/d/d  Drain output = 40 cc          DATA:  CBC:   Lab Results   Component Value Date    WBC 11.9 05/20/2022    RBC 3.25 05/20/2022    HGB 10.1 05/20/2022    HCT 31.1 05/20/2022    MCV 95.7 05/20/2022    MCH 31.1 05/20/2022    MCHC 32.5 05/20/2022    RDW 13.0 05/20/2022     05/20/2022    MPV 10.4 05/20/2022     BMP:    Lab Results   Component Value Date     05/20/2022    K 3.6 05/20/2022    K 3.9 05/13/2022     05/20/2022    CO2 25 05/20/2022    BUN 10 05/20/2022    LABALBU 3.7 10/04/2019    CREATININE 0.5 05/20/2022    CALCIUM 7.4 05/20/2022    GFRAA >60 05/20/2022    LABGLOM >60 05/20/2022    GLUCOSE 145 05/20/2022     PT/INR:    Lab Results   Component Value Date    PROTIME 10.6 05/13/2022    INR 1.0 05/13/2022     PTT:  No results found for: APTT, PTT[APTT}    Current Inpatient Medications  Current Facility-Administered Medications: atorvastatin (LIPITOR) tablet 10 mg, 10 mg, Oral, QPM  dicyclomine (BENTYL) capsule 10 mg, 10 mg, Oral, BID  DULoxetine (CYMBALTA) extended release capsule 60 mg, 60 mg, Oral, Daily  metFORMIN (GLUCOPHAGE) tablet 1,000 mg, 1,000 mg, Oral, BID WC  multivitamin 1 tablet, 1 tablet, Oral, Daily  sodium chloride flush 0.9 % injection 5-40 mL, 5-40 mL, IntraVENous, 2 times per day  sodium chloride flush 0.9 % injection 5-40 mL, 5-40 mL, IntraVENous, PRN  0.9 % sodium chloride infusion, , IntraVENous, PRN  acetaminophen (TYLENOL) tablet 650 mg, 650 mg, Oral, 4 times per day  ondansetron (ZOFRAN-ODT) disintegrating tablet 4 mg, 4 mg, Oral, Q8H PRN **OR** ondansetron (ZOFRAN) injection 4 mg, 4 mg, IntraVENous, Q6H PRN  0.9 % sodium chloride infusion, , IntraVENous, Continuous  ceFAZolin (ANCEF) 2,000 mg in sterile water 20 mL IV syringe, 2,000 mg, IntraVENous, Q8H  oxyCODONE (ROXICODONE) immediate release tablet 5 mg, 5 mg, Oral, Q4H PRN **OR** oxyCODONE HCl (OXY-IR) immediate release tablet 10 mg, 10 mg, Oral, Q4H PRN  morphine (PF) injection 2 mg, 2 mg, IntraVENous, Q2H PRN **OR** morphine sulfate (PF) injection 4 mg, 4 mg, IntraVENous, Q2H PRN  cyclobenzaprine (FLEXERIL) tablet 10 mg, 10 mg, Oral, TID PRN  diphenhydrAMINE (BENADRYL) tablet 25 mg, 25 mg, Oral, Q6H PRN **OR** diphenhydrAMINE (BENADRYL) injection 25 mg, 25 mg, IntraVENous, Q6H PRN  polyethylene glycol (GLYCOLAX) packet 17 g, 17 g, Oral, Daily  bisacodyl (DULCOLAX) EC tablet 5 mg, 5 mg, Oral, Daily  sennosides-docusate sodium (SENOKOT-S) 8.6-50 MG tablet 1 tablet, 1 tablet, Oral, BID  bisacodyl (DULCOLAX) suppository 10 mg, 10 mg, Rectal, Daily PRN  fleet rectal enema 1 enema, 1 enema, Rectal, Daily PRN  benzocaine-menthol (CEPACOL SORE THROAT) lozenge 1 lozenge, 1 lozenge, Oral, Q2H PRN  docusate sodium (COLACE) capsule 200 mg, 200 mg, Oral, BID  glucose chewable tablet 16 g, 4 tablet, Oral, PRN  glucagon (rDNA) injection 1 mg, 1 mg, IntraMUSCular, PRN  dextrose 5 % solution, 100 mL/hr, IntraVENous, PRN  dextrose 50 % IV solution, 12.5 g, IntraVENous, PRN  lisinopril (PRINIVIL;ZESTRIL) tablet 10 mg, 10 mg, Oral, Daily  pantoprazole (PROTONIX) tablet 40 mg, 40 mg, Oral, QAM AC  insulin lispro (HUMALOG) injection vial 0-6 Units, 0-6 Units, SubCUTAneous, TID WC    ASSESSMENT:   s/p L3-L4 PLIF 5/19    PLAN:  -Pain control  -PT/OT with LSO  -Drain care. Plan to remove Sunday.   -PMR consult  -Follow up in neurosurgery clinic in 2 weeks for staple removal and in 4 weeks with x-rays      Electronically signed by Monica Pollard MD on 5/21/2022 at 7:32 AM

## 2022-05-21 NOTE — PROGRESS NOTES
Hospitalist Progress Note      SYNOPSIS: Patient admitted on 2022 for Lumbar adjacent segment disease with spondylolisthesis      SUBJECTIVE:    Feels pain is controlled  dm2  htn  ibs    Den sob den cp den abd pain  Temp (24hrs), Av °F (36.7 °C), Min:97.7 °F (36.5 °C), Max:99.3 °F (37.4 °C)    DIET: ADULT DIET;  Regular; 4 carb choices (60 gm/meal)  CODE: Prior    Intake/Output Summary (Last 24 hours) at 2022 1320  Last data filed at 2022 7342  Gross per 24 hour   Intake 240 ml   Output 40 ml   Net 200 ml       OBJECTIVE:    /62   Pulse 117   Temp 97.7 °F (36.5 °C) (Temporal)   Resp 18   Ht 5' 4\" (1.626 m)   Wt 200 lb (90.7 kg)   SpO2 97%   BMI 34.33 kg/m²    MEWS 3 points   rr 1 point   hr 2 points  General appearance: No apparent distress, appears comfortable not diaphoretic     Respiratory: Clear to auscultation bilaterally, normal respiratory effort  Cardiovascular: pulse Reg audible S1-S2  Abdomen: bs present nondistended  Musculoskeletal: No clubbing,  Warm to palpation  Skin:  No petechiae or hives on inspection no papules on palaption  Neurologic: awake, alert and attentive  Speech clear  ASSESSMENT:    dm2- bs in good range 154 128 141 162   bs controlled  htn essent-stable  ibs-     PLAN:    Contin current regimen for dm2  Contin meds for htn    DISPOSITION:     Medications:  REVIEWED DAILY    Infusion Medications    sodium chloride      sodium chloride 50 mL/hr at 22 1529    dextrose       Scheduled Medications    atorvastatin  10 mg Oral QPM    dicyclomine  10 mg Oral BID    DULoxetine  60 mg Oral Daily    metFORMIN  1,000 mg Oral BID WC    multivitamin  1 tablet Oral Daily    sodium chloride flush  5-40 mL IntraVENous 2 times per day    acetaminophen  650 mg Oral 4 times per day    ceFAZolin (ANCEF) IVPB  2,000 mg IntraVENous Q8H    polyethylene glycol  17 g Oral Daily    bisacodyl  5 mg Oral Daily    sennosides-docusate sodium  1 tablet Oral BID  docusate sodium  200 mg Oral BID    lisinopril  10 mg Oral Daily    pantoprazole  40 mg Oral QAM AC    insulin lispro  0-6 Units SubCUTAneous TID WC     PRN Meds: sodium chloride flush, sodium chloride, ondansetron **OR** ondansetron, oxyCODONE **OR** oxyCODONE, morphine **OR** morphine, cyclobenzaprine, diphenhydrAMINE **OR** diphenhydrAMINE, bisacodyl, fleet, benzocaine-menthol, glucose, glucagon (rDNA), dextrose, dextrose    Labs:     Recent Labs     05/20/22 0613   WBC 11.9*   HGB 10.1*   HCT 31.1*          Recent Labs     05/20/22 0613      K 3.6      CO2 25   BUN 10   CREATININE 0.5   CALCIUM 7.4*           Chronic labs:    Lab Results   Component Value Date    INR 1.0 05/13/2022       Radiology:+++++++++++++++++++++++++++++++++++++++++++++++++  DO Yovani Larkin Physician - [address] [place], [place]  +++++++++++++++++++++++++++++++++++++++++++++++++  NOTE: This report was transcribed using voice recognition software. Every effort was made to ensure accuracy; however, inadvertent computerized transcription errors may be present.

## 2022-05-21 NOTE — PLAN OF CARE
Problem: Discharge Planning  Goal: Discharge to home or other facility with appropriate resources  Outcome: Progressing     Problem: Chronic Conditions and Co-morbidities  Goal: Patient's chronic conditions and co-morbidity symptoms are monitored and maintained or improved  Outcome: Progressing     Problem: Pain  Goal: Verbalizes/displays adequate comfort level or baseline comfort level  Outcome: Completed

## 2022-05-22 VITALS
RESPIRATION RATE: 16 BRPM | WEIGHT: 200 LBS | OXYGEN SATURATION: 98 % | TEMPERATURE: 99 F | SYSTOLIC BLOOD PRESSURE: 128 MMHG | DIASTOLIC BLOOD PRESSURE: 72 MMHG | BODY MASS INDEX: 34.15 KG/M2 | HEART RATE: 112 BPM | HEIGHT: 64 IN

## 2022-05-22 LAB
METER GLUCOSE: 109 MG/DL (ref 74–99)
METER GLUCOSE: 119 MG/DL (ref 74–99)
METER GLUCOSE: >500 MG/DL (ref 74–99)

## 2022-05-22 PROCEDURE — 97530 THERAPEUTIC ACTIVITIES: CPT

## 2022-05-22 PROCEDURE — 97535 SELF CARE MNGMENT TRAINING: CPT

## 2022-05-22 PROCEDURE — 6360000002 HC RX W HCPCS: Performed by: NEUROLOGICAL SURGERY

## 2022-05-22 PROCEDURE — 99024 POSTOP FOLLOW-UP VISIT: CPT | Performed by: NEUROLOGICAL SURGERY

## 2022-05-22 PROCEDURE — 2580000003 HC RX 258: Performed by: NEUROLOGICAL SURGERY

## 2022-05-22 PROCEDURE — 82962 GLUCOSE BLOOD TEST: CPT

## 2022-05-22 PROCEDURE — 6370000000 HC RX 637 (ALT 250 FOR IP): Performed by: NEUROLOGICAL SURGERY

## 2022-05-22 RX ORDER — CYCLOBENZAPRINE HCL 10 MG
10 TABLET ORAL 3 TIMES DAILY PRN
Qty: 30 TABLET | Refills: 0 | Status: SHIPPED | OUTPATIENT
Start: 2022-05-22 | End: 2022-06-02 | Stop reason: SDUPTHER

## 2022-05-22 RX ORDER — OXYCODONE HYDROCHLORIDE 5 MG/1
5 TABLET ORAL EVERY 4 HOURS PRN
Qty: 42 TABLET | Refills: 0 | Status: SHIPPED | OUTPATIENT
Start: 2022-05-22 | End: 2022-06-02 | Stop reason: SDUPTHER

## 2022-05-22 RX ADMIN — SODIUM CHLORIDE, PRESERVATIVE FREE 10 ML: 5 INJECTION INTRAVENOUS at 10:50

## 2022-05-22 RX ADMIN — ACETAMINOPHEN 650 MG: 325 TABLET ORAL at 00:36

## 2022-05-22 RX ADMIN — OXYCODONE HYDROCHLORIDE 10 MG: 10 TABLET ORAL at 09:13

## 2022-05-22 RX ADMIN — CEFAZOLIN 2000 MG: 2 INJECTION, POWDER, FOR SOLUTION INTRAMUSCULAR; INTRAVENOUS at 06:16

## 2022-05-22 RX ADMIN — DOCUSATE SODIUM 200 MG: 100 CAPSULE, LIQUID FILLED ORAL at 10:49

## 2022-05-22 RX ADMIN — METFORMIN HYDROCHLORIDE 1000 MG: 1000 TABLET ORAL at 09:14

## 2022-05-22 RX ADMIN — CEFAZOLIN 2000 MG: 2 INJECTION, POWDER, FOR SOLUTION INTRAMUSCULAR; INTRAVENOUS at 00:36

## 2022-05-22 RX ADMIN — Medication 1 TABLET: at 10:50

## 2022-05-22 RX ADMIN — BISACODYL 5 MG: 5 TABLET, COATED ORAL at 10:50

## 2022-05-22 RX ADMIN — ACETAMINOPHEN 650 MG: 325 TABLET ORAL at 12:58

## 2022-05-22 RX ADMIN — ACETAMINOPHEN 650 MG: 325 TABLET ORAL at 06:18

## 2022-05-22 RX ADMIN — PANTOPRAZOLE SODIUM 40 MG: 40 TABLET, DELAYED RELEASE ORAL at 06:17

## 2022-05-22 RX ADMIN — DICYCLOMINE HYDROCHLORIDE 10 MG: 10 CAPSULE ORAL at 10:48

## 2022-05-22 RX ADMIN — LISINOPRIL 10 MG: 20 TABLET ORAL at 10:49

## 2022-05-22 RX ADMIN — POLYETHYLENE GLYCOL 3350 17 G: 17 POWDER, FOR SOLUTION ORAL at 10:50

## 2022-05-22 RX ADMIN — DULOXETINE HYDROCHLORIDE 60 MG: 60 CAPSULE, DELAYED RELEASE ORAL at 10:49

## 2022-05-22 RX ADMIN — SENNOSIDES AND DOCUSATE SODIUM 1 TABLET: 50; 8.6 TABLET ORAL at 10:49

## 2022-05-22 ASSESSMENT — PAIN SCALES - GENERAL
PAINLEVEL_OUTOF10: 3
PAINLEVEL_OUTOF10: 5
PAINLEVEL_OUTOF10: 7
PAINLEVEL_OUTOF10: 5
PAINLEVEL_OUTOF10: 3
PAINLEVEL_OUTOF10: 5

## 2022-05-22 ASSESSMENT — PAIN - FUNCTIONAL ASSESSMENT
PAIN_FUNCTIONAL_ASSESSMENT: PREVENTS OR INTERFERES SOME ACTIVE ACTIVITIES AND ADLS

## 2022-05-22 ASSESSMENT — PAIN DESCRIPTION - ONSET
ONSET: ON-GOING

## 2022-05-22 ASSESSMENT — PAIN DESCRIPTION - LOCATION
LOCATION: BACK

## 2022-05-22 ASSESSMENT — PAIN DESCRIPTION - FREQUENCY
FREQUENCY: CONTINUOUS

## 2022-05-22 ASSESSMENT — PAIN DESCRIPTION - ORIENTATION
ORIENTATION: LOWER;POSTERIOR

## 2022-05-22 ASSESSMENT — PAIN DESCRIPTION - DESCRIPTORS
DESCRIPTORS: DISCOMFORT;SORE;DULL
DESCRIPTORS: DISCOMFORT;DULL;SORE
DESCRIPTORS: DISCOMFORT;DULL;SORE

## 2022-05-22 ASSESSMENT — PAIN DESCRIPTION - PAIN TYPE
TYPE: SURGICAL PAIN

## 2022-05-22 NOTE — CARE COORDINATION
5/22 Care Coordination: Order for [facility], Plan discharge today. Spoke with pt and is fine with [facility]. She had no preference as long as take her Insurance. Referral called to [facility], will start on Wednesday . RN notified she will message physician to get order that's okay to start [facility] Wednesday  CM/SW will continue to follow for discharge planning.    Uri TREVIÑO,RN--BC  [phone]

## 2022-05-22 NOTE — PROGRESS NOTES
Physical Therapy  Treatment Note    Name: Juhi Morgan  : 1960  MRN: [id number]      Date of Service: 2022    Evaluating PT:  Norah Benítez PT, DPT [id number]    Room #:  4996/3181-Z  Diagnosis:  Lumbar adjacent segment disease with spondylolisthesis [M51.36, M43.16]  PMHx/PSHx:  Arthritis, depression, DM, HTN, IBS, sleep apnea  Procedure/Surgery:  EXPLORATION OF L4-S1 FUSION, L3-L4 POSTERIOR LUMBAR INTERBODY FUSION   Precautions:  Falls, LSO, spinal  Equipment Needs:  Patient needs front wheeled walker    SUBJECTIVE:    Pt lives with  in a mobile home with 6 ORIANA and 2 rails. Bed is on 1st floor and bath is on 1st floor. Pt ambulated with cane as needed PTA. OBJECTIVE:   Initial Evaluation  Date: 22 Treatment  Date: 22 Short Term/ Long Term   Goals   AM-PAC 6 Clicks 71/69 68/94    Was pt agreeable to Eval/treatment? yes yes    Does pt have pain? 7/10 back pain 6/10 B anterior thigh    Bed Mobility  Rolling: Jesús  Supine to sit: Jesús  Sit to supine: Jesús  Scooting: Jesús Rolling: NT  Supine to sit: NT  Sit to supine: NT  Scooting: NT Rolling: Independent   Supine to sit: Independent   Sit to supine: Independent   Scooting: Independent    Transfers Sit to stand: SBA  Stand to sit: SBA  Stand pivot: SBA with [employer] Sit to stand: SBA  Stand to sit: SBA  Stand pivot: CGA with ww Sit to stand: Independent   Stand to sit: Independent   Stand pivot: Mod I with [employer]   Ambulation    30 feet with [employer] SBA 75'x2 with ww SBA >100 feet with [employer] Mod I   Stair negotiation: ascended and descended  NT 5 steps with 1 handrail and HHA, CGA 6 steps with 2 rail Mod I   ROM BUE:  Defer to OT note  BLE:  WFL     Strength BUE:  Defer to OT note  BLE:  Grossly 3+/5  Improve by one MMT grade   Balance Sitting EOB:  SBA  Dynamic Standing:  SBA with [employer] Sitting EOB:  SBA  Dynamic Standing:  CGA with ww Sitting EOB:  Independent   Dynamic Standing:   Mod I with [employer]     Pt is A & O x 4  Sensation:  Denies abnormalities  Edema:  None noted    Patient education  Pt reeducated on role of PT, spinal precautions, safety during mobility    Patient response to education:   Pt verbalized understanding Pt demonstrated skill Pt requires further education in this area   yes yes reinforce     ASSESSMENT:    Comments:    Pt sitting upright in bedside chair upon entering, pt agreeable to participate. Pt cued for hand placement and instructed to stand from EOB, pt standing with ww demonstrating fair static standing balance. Pt instructed to ambulate to tolerance, demonstrating fair rob with narrow DAVE, externally rotated LLE. Pt cued for increased spacing between feet to improve DAVE. Pt was also agreeable to attempt stair negotiation, cued for hand placement and sequencing to assure pt safety. Pt returned to bedside chair at the end of session, cued for positioning and hand placement. Pt remained in chair with all needs met and call bell in reach prior to exiting. Treatment:  Patient practiced and was instructed in the following treatment:     STS and pivot transfer training - pt educated on proper hand and foot placement, safety and sequencing, and use of verbal and tactile cues to safely complete sit<>stand and pivot transfers with hands on assistance to complete task safely    Gait training- pt was given verbal and tactile cues to facilitate pt safety during ambulation and stair negotiation as well as provided with hands on assistance. PLAN:    Patient is making good progress towards established goals. Will continue with current POC.       Time in  0840  Time out  0905    Total Treatment Time  23 minutes     CPT codes:  [] Gait training 51398 -- minutes  [] Manual therapy [id number] -- minutes  [x] Therapeutic activities 80068 23 minutes  [] Therapeutic exercises 19217 -- minutes  [] Neuromuscular reeducation 66817 -- minutes    Nadine Guevara, PT, DPT  [id number]

## 2022-05-22 NOTE — HOME CARE
[facility] referral received. Awaiting return call from patient to verify demographics. Plan to see after discharge on 5/25/22. Author ISH Scott [facility].

## 2022-05-22 NOTE — PLAN OF CARE
Problem: Chronic Conditions and Co-morbidities  Goal: Patient's chronic conditions and co-morbidity symptoms are monitored and maintained or improved  Outcome: Adequate for Discharge     Problem: Discharge Planning  Goal: Discharge to home or other facility with appropriate resources  Outcome: Completed

## 2022-05-22 NOTE — PROGRESS NOTES
Hospitalist Progress Note      SYNOPSIS: Patient admitted on 2022 for Lumbar adjacent segment disease with spondylolisthesis      SUBJECTIVE:    Feels well  dm2  htn  ibs    Den sob den cp den abd pain  Temp (24hrs), Av °F (36.7 °C), Min:97.4 °F (36.3 °C), Max:99 °F (37.2 °C)    DIET: ADULT DIET;  Regular; 4 carb choices (60 gm/meal)  CODE: Prior    Intake/Output Summary (Last 24 hours) at 2022 1148  Last data filed at 2022 0841  Gross per 24 hour   Intake 420 ml   Output --   Net 420 ml       OBJECTIVE:mews scre 3points    /72   Pulse 112   Temp 99 °F (37.2 °C) (Oral)   Resp 16   Ht 5' 4\" (1.626 m)   Wt 200 lb (90.7 kg)   SpO2 98%   BMI 34.33 kg/m²    General appearance: No apparent distress, appears comfortable not diaphoretic     Respiratory: Clear to auscultation bilaterally, normal respiratory effort  Cardiovascular: pulse Reg audible S1-S2  Abdomen: bs present nondistended  Musculoskeletal: No clubbing,  Warm to palpation  Skin:  No petechiae or hives on inspection no papules on palaption  Neurologic: awake, alert and attentive  Speech clear  ASSESSMENT:    dm2- bs in good range  bs controlled  109 144  128  htn essent-stable  ibs-     PLAN:no objections to dc to e  Contin home antihypertensive  Contin home  regimen for dm2    Metformin  discontin sq insuln for home  Patient advised to monit her bs periodically/frequently during the day while she is at home recovering and to follow the treands  If bs at home persistantly above 180 she should contact her pcp for recommnedations  Sound hospitalist service signs off        DISPOSITION:     Medications:  REVIEWED DAILY    Infusion Medications    sodium chloride      sodium chloride 50 mL/hr at 22 1529    dextrose       Scheduled Medications    atorvastatin  10 mg Oral QPM    dicyclomine  10 mg Oral BID    DULoxetine  60 mg Oral Daily    metFORMIN  1,000 mg Oral BID WC    multivitamin  1 tablet Oral Daily    sodium chloride flush  5-40 mL IntraVENous 2 times per day    acetaminophen  650 mg Oral 4 times per day    polyethylene glycol  17 g Oral Daily    bisacodyl  5 mg Oral Daily    sennosides-docusate sodium  1 tablet Oral BID    docusate sodium  200 mg Oral BID    lisinopril  10 mg Oral Daily    pantoprazole  40 mg Oral QAM AC    insulin lispro  0-6 Units SubCUTAneous TID WC     PRN Meds: sodium chloride flush, sodium chloride, ondansetron **OR** ondansetron, oxyCODONE **OR** oxyCODONE, morphine **OR** morphine, cyclobenzaprine, diphenhydrAMINE **OR** diphenhydrAMINE, bisacodyl, fleet, benzocaine-menthol, glucose, glucagon (rDNA), dextrose, dextrose    Labs:     Recent Labs     05/20/22  0613   WBC 11.9*   HGB 10.1*   HCT 31.1*          Recent Labs     05/20/22 0613      K 3.6      CO2 25   BUN 10   CREATININE 0.5   CALCIUM 7.4*           Chronic labs:    Lab Results   Component Value Date    INR 1.0 05/13/2022       Radiology:+++++++++++++++++++++++++++++++++++++++++++++++++  DO Yovani Fairbanks Physician - [address] [place], [place]  +++++++++++++++++++++++++++++++++++++++++++++++++  NOTE: This report was transcribed using voice recognition software. Every effort was made to ensure accuracy; however, inadvertent computerized transcription errors may be present.

## 2022-05-22 NOTE — PROGRESS NOTES
Occupational Therapy  OT BEDSIDE TREATMENT NOTE   [address] [facility] [address] [address], [address] [place] [address]  Patient Name: Viktoria Moritz  MRN: [id number]  : 1960  Room: [address] [place] [address]     Per OT Eval:     Evaluating OT: Harpal Merritt OTR/L; [id number]        Referring Provider: Tana James MD    Specific Provider Orders/Date: OT Eval and Treat 22        Diagnosis: Lumbar adjacent segment disease with spondylolisthesis    Surgery: 22  1.  Exploration of prior L4 to S1 fusion. 2.  Removal of previously placed L5 and S1 pedicle screws. 3.  Bilateral segmental arthrodesis and fusion at L3-L4 with use of  bilateral L3 and L4 pedicle screws with use of locally harvested  autograft plus recombinant BMP-2 (INFUSE) posterolateral fusion at  L3-L4. 4.  360-degree fusion with posterior lumbar interbody fusion at L3-4  with use of bilateral Globus Sable expandable cages packed with  recombinant BMP-2 (INFUSE). 5.  Bilateral L3 and L4 laminectomy, bilateral L2 to L4 medial  facetectomy, bilateral L3 and L4 foraminotomy and bilateral L2-L4  diskectomy. 6.  Use of O-arm assisted navigation with placement of pedicle screws. 7.  Use of free-running EMG to test pedicle screw heads. 8.  Use of intraoperative fluoroscopy interpreted by myself, the  surgeon. 9.  AS modifier for Jett Barker PA-C, who assisted with primary  exposure and primary closure and retraction of neural elements. Pertinent Medical History:  has a past medical history of Arthritis, Depression, DM (diabetes mellitus) ([facility] [address] History of blood transfusion, Hypertension, IBS (irritable bowel syndrome), and Sleep apnea.      Recommended Adaptive Equipment: front wheeled walker      Precautions:  Fall Risk, spinal precautions, STACY drain, zavala, LSO brace      Assessment of current deficits    [x]? Functional mobility            [x]?ADLs           [x]?  Strength []?Cognition    [x]? Functional transfers          [x]? IADLs         [x]? Safety Awareness   [x]? Endurance    []? Fine Coordination                         [x]? Balance      []? Vision/perception   []? Sensation      []? Gross Motor Coordination             []? ROM           []? Delirium                   []? Motor Control      OT PLAN OF CARE   OT POC based on physician orders, patient diagnosis and results of clinical assessment     Frequency/Duration 3-5 days/wk for 2 weeks PRN   Specific OT Treatment Interventions to include:   * Instruction/training on adapted ADL techniques and AE recommendations to increase functional independence within precautions       * Training on energy conservation strategies, correct breathing pattern and techniques to improve independence/tolerance for self-care routine  * Functional transfer/mobility training/DME recommendations for increased independence, safety, and fall prevention  * Patient/Family education to increase follow through with safety techniques and functional independence  * Recommendation of environmental modifications for increased safety with functional transfers/mobility and ADLs  * Therapeutic exercise to improve motor endurance, ROM, and functional strength for ADLs/functional transfers  * Therapeutic activities to facilitate/challenge dynamic balance, stand tolerance for increased safety and independence with ADLs  * Positioning to improve skin integrity, interaction with environment and functional independence     Home Living: Pt lives with  in trailor with 5 steps & 2 handrails to enter.     Bathroom setup: Tub/shower   Equipment owned: [facility]     Prior Level of Function: assist (LB dressing & toileting) with ADLs , shared IADLs between pt & ; engaged in functional mobility without use of AD  Driving: Yes  Occupation: None reported     Pain Level: 6/10 B legs, throbbing pain, claritza into L thigh, nsg informed & will provide pain meds, no pain during mobility or activity per pt    Cognition: A&O: 4/4; Follows 2 step directions, pleasant & cooperative, eager to go home              Memory:  Good able to recall 3/3 spinal precautions               Sequencing:  Fair+              Problem solving:  Fair+              Judgement/safety:  Fair+                Functional Assessment:  AM-PAC Daily Activity Raw Score: 16/24    Initial Eval Status  Date: 5/20/22 Treatment Status  Date:  5/22/22 STGs = LTGs  Time frame: 10-14 days   Feeding Independent        Grooming Minimal Assist SBA  Washing hands at sink with walker Independent    UB Dressing Maximal Assist to don LSO brace at bed-level  Set up gown  Mod A with brace (simulated)   in place upon arrival, pt able to verbalize technique to charisma, spouse will assist  Independent   LB Dressing Maximal Assist   Mod A  Simulated pants, pt declined need for any AE, pt does not wear socks, has reacher at home & reports her  will assist with LB dressing  Independent   Bathing Moderate Assist   Mod A  Offered LH sponge with pt declining, reports  will assist Independent    Toileting Moderate Assist  SBA  Pt able to complete hygiene, no questions regarding toileting   (pt reports high toilet seat at home)  Independnet   Bed Mobility  Log Roll: Minimal Assist  Supine to sit: Moderate Assist   Sit to supine:NBT  NT  Verbalized good understanding of log toll technique   Supine to sit: Independent   Sit to supine: Independent    Functional Transfers Sit to stand:Minimal Assist   Stand to sit:Minimal Assist  Stand pivot: Minimal Assist  Commode: NT SBA   Sit < > stand  Stand pivot   From chair & commode  Cues needed for safety with grab bar in bathroom due to low commode with improved results  Sit to stand:Independent  Stand to sit: Independent  Stand pivot: Independent  Commode: Indepdent    Functional Mobility Minimal Assist  No use of AD shorter than household distance  SBA   With walker, household distances Independent Balance Sitting:     Static - SBA     Dynamic - Minimal Assist  Standing: Minimal Assist Sitting: Indep  Standing: SBA  With walker Sitting:     Static: Independent     Dynamic: Independent  Standing: Independent   Activity Tolerance Fair  fair Good   Visual/  Perceptual Glasses: Yes  Appears WFL           Safety Fair   Good  during ADL completion following spinal precautions      Hand Dominance Right    AROM (PROM) Strength Additional Info:  Goal:   RUE  WFL 4-/5 grossly tested good  and wfl FMC/dexterity noted during ADL tasks    Improve overall RUE strength WFL for participation in functional tasks         LUE WFL 4-/5 grossly tested Good  and wfl FMC/dexterity noted during ADL tasks    Improve overall LUE strength WFL for participation in functional tasks         Education:  Pt was educated through out treatment regarding proper technique & safety with bed mobility, functional transfers & mobility, maintaining spinal precautions & ADL compensatory strategies along with recommendations of AE/AD to ease tasks, improve safety & prevent falls to return home safely. Discussed home set up with no questions or concerns at this time. Comments: Upon arrival pt was seated in chair & agreeable for therapy. At end of session pt was seated in chair  all lines and tubes intact, call light within reach. · Pt has made Good progress towards set goals. · Continue with current plan of care      Treatment Time In: 8:40            Treatment Time Out: 9:03          Treatment Charges: Mins Units   Ther Ex  99188     Manual Therapy [id number]     Thera Activities 80872 10 1   ADL/Home Mgt 99010 13 1   Neuro Re-ed 45775     Group Therapy      Orthotic manage/training  16163     Non-Billable Time     Total Timed Treatment 23 2       Merari JDeny  [address] [place] [address], [address] [place] [address]

## 2022-05-22 NOTE — PROGRESS NOTES
Called Dr. Gabriela Gilman regarding placing the orders for [facility] to begin on Wednesday.

## 2022-05-22 NOTE — PROGRESS NOTES
Department of Neurosurgery  Progress Note    CHIEF COMPLAINT:  POD #3 s/p L3-L4     SUBJECTIVE:  Donnell op site pain well. OOB eating breakfast.  +BM and doing well on stairs with PT/OT  REVIEW OF SYSTEMS :  Constitutional: Negative for chills and fever. Neurological: Negative for dizziness, tremors and speech change. OBJECTIVE:   VITALS:  /72   Pulse 112   Temp 99 °F (37.2 °C) (Oral)   Resp 16   Ht 5' 4\" (1.626 m)   Wt 200 lb (90.7 kg)   SpO2 98%   BMI 34.33 kg/m²     PHYSICAL:  AAO x4, rationally conversant. Rawleigh Billing Speech clear  Face symmetric GI/FT  Tongue MDL  SS symm and strong  CAAL-C, preserved power  Incision: c/d/i  I d/c'd the drain at bedside and without incident.           DATA:  CBC:   Lab Results   Component Value Date    WBC 11.9 05/20/2022    RBC 3.25 05/20/2022    HGB 10.1 05/20/2022    HCT 31.1 05/20/2022    MCV 95.7 05/20/2022    MCH 31.1 05/20/2022    MCHC 32.5 05/20/2022    RDW 13.0 05/20/2022     05/20/2022    MPV 10.4 05/20/2022     BMP:    Lab Results   Component Value Date     05/20/2022    K 3.6 05/20/2022    K 3.9 05/13/2022     05/20/2022    CO2 25 05/20/2022    BUN 10 05/20/2022    LABALBU 3.7 10/04/2019    CREATININE 0.5 05/20/2022    CALCIUM 7.4 05/20/2022    GFRAA >60 05/20/2022    LABGLOM >60 05/20/2022    GLUCOSE 145 05/20/2022     PT/INR:    Lab Results   Component Value Date    PROTIME 10.6 05/13/2022    INR 1.0 05/13/2022     PTT:  No results found for: APTT, PTT[APTT}    Current Inpatient Medications  Current Facility-Administered Medications: atorvastatin (LIPITOR) tablet 10 mg, 10 mg, Oral, QPM  dicyclomine (BENTYL) capsule 10 mg, 10 mg, Oral, BID  DULoxetine (CYMBALTA) extended release capsule 60 mg, 60 mg, Oral, Daily  metFORMIN (GLUCOPHAGE) tablet 1,000 mg, 1,000 mg, Oral, BID   multivitamin 1 tablet, 1 tablet, Oral, Daily  sodium chloride flush 0.9 % injection 5-40 mL, 5-40 mL, IntraVENous, 2 times per day  sodium chloride flush 0.9 % injection 5-40 mL, 5-40 mL, IntraVENous, PRN  0.9 % sodium chloride infusion, , IntraVENous, PRN  acetaminophen (TYLENOL) tablet 650 mg, 650 mg, Oral, 4 times per day  ondansetron (ZOFRAN-ODT) disintegrating tablet 4 mg, 4 mg, Oral, Q8H PRN **OR** ondansetron (ZOFRAN) injection 4 mg, 4 mg, IntraVENous, Q6H PRN  0.9 % sodium chloride infusion, , IntraVENous, Continuous  oxyCODONE (ROXICODONE) immediate release tablet 5 mg, 5 mg, Oral, Q4H PRN **OR** oxyCODONE HCl (OXY-IR) immediate release tablet 10 mg, 10 mg, Oral, Q4H PRN  morphine (PF) injection 2 mg, 2 mg, IntraVENous, Q2H PRN **OR** morphine sulfate (PF) injection 4 mg, 4 mg, IntraVENous, Q2H PRN  cyclobenzaprine (FLEXERIL) tablet 10 mg, 10 mg, Oral, TID PRN  diphenhydrAMINE (BENADRYL) tablet 25 mg, 25 mg, Oral, Q6H PRN **OR** diphenhydrAMINE (BENADRYL) injection 25 mg, 25 mg, IntraVENous, Q6H PRN  polyethylene glycol (GLYCOLAX) packet 17 g, 17 g, Oral, Daily  bisacodyl (DULCOLAX) EC tablet 5 mg, 5 mg, Oral, Daily  sennosides-docusate sodium (SENOKOT-S) 8.6-50 MG tablet 1 tablet, 1 tablet, Oral, BID  bisacodyl (DULCOLAX) suppository 10 mg, 10 mg, Rectal, Daily PRN  fleet rectal enema 1 enema, 1 enema, Rectal, Daily PRN  benzocaine-menthol (CEPACOL SORE THROAT) lozenge 1 lozenge, 1 lozenge, Oral, Q2H PRN  docusate sodium (COLACE) capsule 200 mg, 200 mg, Oral, BID  glucose chewable tablet 16 g, 4 tablet, Oral, PRN  glucagon (rDNA) injection 1 mg, 1 mg, IntraMUSCular, PRN  dextrose 5 % solution, 100 mL/hr, IntraVENous, PRN  dextrose 50 % IV solution, 12.5 g, IntraVENous, PRN  lisinopril (PRINIVIL;ZESTRIL) tablet 10 mg, 10 mg, Oral, Daily  pantoprazole (PROTONIX) tablet 40 mg, 40 mg, Oral, QAM AC  insulin lispro (HUMALOG) injection vial 0-6 Units, 0-6 Units, SubCUTAneous, TID WC    ASSESSMENT:   s/p L3-L4 PLIF 5/19    PLAN:    D/C home with [address] to start on Wednesday.   Rxs signed and D/C instructions given by me.  -Follow up in neurosurgery clinic in 2 weeks for staple removal and in 4 weeks with x-rays      Electronically signed by Matthew Rhodes MD on 5/22/2022 at 8:17 AM

## 2022-05-22 NOTE — PROGRESS NOTES
Pt called after discharge regarding her prescriptions. [facility] in [place] will not fill them for her as they are registering that we filled them for her. I called our pharmacy to see what they can do to assist this patient. [place], Pharmacist said to ask [facility] if they will give her an \"emergency supply\" until tomorrow when [facility] Pharmacy is back. Have [facility] call and reverse the prescription order. I called Jean-Pierre Rogers back to advise. She also told me she needed an OP Walker. I called Warren Sexton who explained to me that she will have to discuss this with her Cindy 80. Patient did advise she will use her mom's walker at home.

## 2022-05-23 NOTE — DISCHARGE SUMMARY
Neurosurgery Surgery Discharge Summary    [address] [facility] SUMMARY:                The patient is a 63 y.o. female who was admitted to the hospital on 5/19/2022  5:11 AM for treatment of lumbar adjacent segment disease. On the day of admission, a was performed. The patient's hospital course was uncomplicated and consisted of physical therapy, incision observation, and a return to normal oral intake. The patient was discharged on 5/22/2022  1:45 PM tolerating a diet, moving bowels, and urinating without difficulty. The incisions were clean and intact. The patient was discharged to home with home healthcare in satisfactory condition with instructions to call the office for a follow up appointment. Hospital Problem List:  Principal Problem:    Lumbar adjacent segment disease with spondylolisthesis  Resolved Problems:    * No resolved hospital problems. *     Procedure(s) (LRB):  EXPLORATION OF L4-S1 FUSION, L3-L4 POSTERIOR LUMBAR INTERBODY FUSION (N/A)    Discharge Medications:   Discharge Medication List as of 5/22/2022  1:31 PM      CONTINUE these medications which have CHANGED    Details   oxyCODONE (ROXICODONE) 5 MG immediate release tablet Take 1 tablet by mouth every 4 hours as needed for Pain for up to 7 days. , Disp-42 tablet, R-0Print      cyclobenzaprine (FLEXERIL) 10 MG tablet Take 1 tablet by mouth 3 times daily as needed for Muscle spasms, Disp-30 tablet, R-0Print         CONTINUE these medications which have NOT CHANGED    Details   DULoxetine (CYMBALTA) 60 MG extended release capsule Take 60 mg by mouth dailyHistorical Med      atorvastatin (LIPITOR) 10 MG tablet Take 10 mg by mouth dailyHistorical Med      benazepril (LOTENSIN) 10 MG tablet Take 10 mg by mouth dailyHistorical Med      metFORMIN (GLUCOPHAGE) 1000 MG tablet Take 1,000 mg by mouth 2 times daily (with meals)Historical Med      dicyclomine (BENTYL) 10 MG capsule Take 10 mg by mouth 2 times daily Historical Med omeprazole (PRILOSEC) 20 MG capsule Take 20 mg by mouth daily. multivitamin (THERAGRAN) per tablet Take 1 tablet by mouth daily.              Montana Duque PA-C  5/23/2022

## 2022-06-02 ENCOUNTER — OFFICE VISIT (OUTPATIENT)
Dept: NEUROSURGERY | Age: 62
End: 2022-06-02
Payer: COMMERCIAL

## 2022-06-02 DIAGNOSIS — M51.36 LUMBAR ADJACENT SEGMENT DISEASE WITH SPONDYLOLISTHESIS: ICD-10-CM

## 2022-06-02 DIAGNOSIS — Z98.1 S/P LUMBAR FUSION: Primary | ICD-10-CM

## 2022-06-02 DIAGNOSIS — M43.16 LUMBAR ADJACENT SEGMENT DISEASE WITH SPONDYLOLISTHESIS: ICD-10-CM

## 2022-06-02 PROCEDURE — 99024 POSTOP FOLLOW-UP VISIT: CPT | Performed by: STUDENT IN AN ORGANIZED HEALTH CARE EDUCATION/TRAINING PROGRAM

## 2022-06-02 PROCEDURE — 99212 OFFICE O/P EST SF 10 MIN: CPT

## 2022-06-02 RX ORDER — OXYCODONE HYDROCHLORIDE 5 MG/1
5 TABLET ORAL EVERY 4 HOURS PRN
Qty: 42 TABLET | Refills: 0 | Status: SHIPPED
Start: 2022-06-02 | End: 2022-06-16 | Stop reason: SDUPTHER

## 2022-06-02 RX ORDER — CYCLOBENZAPRINE HCL 10 MG
10 TABLET ORAL 3 TIMES DAILY PRN
Qty: 30 TABLET | Refills: 0 | Status: SHIPPED
Start: 2022-06-02 | End: 2022-06-16 | Stop reason: SDUPTHER

## 2022-06-16 ENCOUNTER — HOSPITAL ENCOUNTER (OUTPATIENT)
Dept: GENERAL RADIOLOGY | Age: 62
Discharge: HOME | End: 2022-06-18
Payer: COMMERCIAL

## 2022-06-16 ENCOUNTER — HOSPITAL ENCOUNTER (OUTPATIENT)
Age: 62
Discharge: HOME | End: 2022-06-18
Payer: COMMERCIAL

## 2022-06-16 ENCOUNTER — OFFICE VISIT (OUTPATIENT)
Dept: NEUROSURGERY | Age: 62
End: 2022-06-16

## 2022-06-16 VITALS
OXYGEN SATURATION: 96 % | DIASTOLIC BLOOD PRESSURE: 70 MMHG | HEART RATE: 110 BPM | RESPIRATION RATE: 20 BRPM | HEIGHT: 64 IN | WEIGHT: 200 LBS | TEMPERATURE: 98.1 F | BODY MASS INDEX: 34.15 KG/M2 | SYSTOLIC BLOOD PRESSURE: 118 MMHG

## 2022-06-16 DIAGNOSIS — Z98.1 S/P LUMBAR FUSION: ICD-10-CM

## 2022-06-16 DIAGNOSIS — M43.16 LUMBAR ADJACENT SEGMENT DISEASE WITH SPONDYLOLISTHESIS: ICD-10-CM

## 2022-06-16 DIAGNOSIS — Z98.1 S/P LUMBAR FUSION: Primary | ICD-10-CM

## 2022-06-16 DIAGNOSIS — M51.36 LUMBAR ADJACENT SEGMENT DISEASE WITH SPONDYLOLISTHESIS: ICD-10-CM

## 2022-06-16 PROCEDURE — 72100 X-RAY EXAM L-S SPINE 2/3 VWS: CPT

## 2022-06-16 PROCEDURE — 99024 POSTOP FOLLOW-UP VISIT: CPT | Performed by: PHYSICIAN ASSISTANT

## 2022-06-16 RX ORDER — OXYCODONE HYDROCHLORIDE 5 MG/1
5 TABLET ORAL EVERY 4 HOURS PRN
Qty: 42 TABLET | Refills: 0 | Status: SHIPPED
Start: 2022-06-16 | End: 2022-07-12 | Stop reason: SDUPTHER

## 2022-06-16 RX ORDER — CYCLOBENZAPRINE HCL 10 MG
10 TABLET ORAL 3 TIMES DAILY PRN
Qty: 30 TABLET | Refills: 0 | Status: SHIPPED
Start: 2022-06-16 | End: 2022-07-12 | Stop reason: SDUPTHER

## 2022-06-16 NOTE — PROGRESS NOTES
Post Operative Follow-up    Patient is status post:  Lumbar fusion one month ago. No leg pain. genaro op site pain well. Physical Exam  Alert and Oriented X 3  PERRLA, EOMI  CAAL 5/5  Wound: C/D/I    A/P: patient is s/p L3-4 PLIF one month ago. X-rays stable. Continue with brace and restrictions.

## 2022-07-12 ENCOUNTER — TELEPHONE (OUTPATIENT)
Dept: NEUROSURGERY | Age: 62
End: 2022-07-12

## 2022-07-12 DIAGNOSIS — Z98.1 S/P LUMBAR FUSION: ICD-10-CM

## 2022-07-12 DIAGNOSIS — M43.16 LUMBAR ADJACENT SEGMENT DISEASE WITH SPONDYLOLISTHESIS: ICD-10-CM

## 2022-07-12 DIAGNOSIS — M51.36 LUMBAR ADJACENT SEGMENT DISEASE WITH SPONDYLOLISTHESIS: ICD-10-CM

## 2022-07-12 RX ORDER — CYCLOBENZAPRINE HCL 10 MG
10 TABLET ORAL 3 TIMES DAILY PRN
Qty: 30 TABLET | Refills: 0 | Status: SHIPPED | OUTPATIENT
Start: 2022-07-12 | End: 2022-07-22

## 2022-07-12 RX ORDER — OXYCODONE HYDROCHLORIDE 5 MG/1
5 TABLET ORAL EVERY 4 HOURS PRN
Qty: 42 TABLET | Refills: 0 | Status: SHIPPED | OUTPATIENT
Start: 2022-07-12 | End: 2022-07-19

## 2022-08-11 ENCOUNTER — OFFICE VISIT (OUTPATIENT)
Dept: NEUROSURGERY | Age: 62
End: 2022-08-11

## 2022-08-11 ENCOUNTER — HOSPITAL ENCOUNTER (OUTPATIENT)
Dept: GENERAL RADIOLOGY | Age: 62
Discharge: HOME | End: 2022-08-13
Payer: COMMERCIAL

## 2022-08-11 ENCOUNTER — HOSPITAL ENCOUNTER (OUTPATIENT)
Age: 62
Discharge: HOME | End: 2022-08-13
Payer: COMMERCIAL

## 2022-08-11 VITALS
RESPIRATION RATE: 20 BRPM | DIASTOLIC BLOOD PRESSURE: 53 MMHG | SYSTOLIC BLOOD PRESSURE: 133 MMHG | OXYGEN SATURATION: 95 % | BODY MASS INDEX: 34.15 KG/M2 | HEART RATE: 97 BPM | WEIGHT: 200 LBS | TEMPERATURE: 98.3 F | HEIGHT: 64 IN

## 2022-08-11 DIAGNOSIS — Z98.1 S/P LUMBAR FUSION: Primary | ICD-10-CM

## 2022-08-11 DIAGNOSIS — Z98.1 S/P LUMBAR FUSION: ICD-10-CM

## 2022-08-11 PROCEDURE — 99024 POSTOP FOLLOW-UP VISIT: CPT | Performed by: STUDENT IN AN ORGANIZED HEALTH CARE EDUCATION/TRAINING PROGRAM

## 2022-08-11 PROCEDURE — 72100 X-RAY EXAM L-S SPINE 2/3 VWS: CPT

## 2022-08-11 RX ORDER — HYDROCODONE BITARTRATE AND ACETAMINOPHEN 5; 325 MG/1; MG/1
1 TABLET ORAL EVERY 4 HOURS PRN
Qty: 40 TABLET | Refills: 0 | Status: SHIPPED | OUTPATIENT
Start: 2022-08-11 | End: 2022-08-18

## 2022-08-11 NOTE — PROGRESS NOTES
Post-Operative Follow-up     This is a 63year old female who presents to the office for a 3 month follow-up s/p L3-L4 PLIF. Subjective: Patient states she is doing well. States she gets back pain here and there but overall doing better. Denies any radiation of the pain down the legs. Brace and bone stimulator compliant. XR reviewed with patient. Denies loss of bowel or bladder, saddle anesthesia, headache, loss of dexterity, abnormal gait, fever, chills, N/V, SOB, or chest pain. Physical Exam:              WDWN, no apparent distress              Non-labored breathing               Vitals Stable              Alert and oriented x3              CN 3-12 intact              PERRL              EOMI              CAAL well              Motor strength symmetric              Sensation to LT intact bilaterally   Incision healed well without signs of infection. Imagin2022 XR Lumbar Spine  Hardware intact, stable, no acute processes noted- final read pending. Assessment: This is a 63 y.o.  female presenting for a 3 month follow-up s/p L3-L4 PLIF. Stable. Plan:  -Pain control and expectations discussed  -Can discontinue brace and restrictions   -PT referral given- patient declined at this time. -OARRS report reviewed   -Follow-up in neurosurgery clinic in 9 months with XR  -Call or return to neurosurgery office sooner if symptoms worsen or if new issues arise in the interim.     Electronically signed by Joesphine Litten, PA-C on 2022 at 6:01 PM

## 2023-05-16 ENCOUNTER — HOSPITAL ENCOUNTER (OUTPATIENT)
Age: 63
Discharge: HOME | End: 2023-05-18
Payer: COMMERCIAL

## 2023-05-16 ENCOUNTER — OFFICE VISIT (OUTPATIENT)
Dept: NEUROSURGERY | Age: 63
End: 2023-05-16
Payer: COMMERCIAL

## 2023-05-16 ENCOUNTER — HOSPITAL ENCOUNTER (OUTPATIENT)
Dept: GENERAL RADIOLOGY | Age: 63
Discharge: HOME | End: 2023-05-18
Payer: COMMERCIAL

## 2023-05-16 VITALS
HEIGHT: 64 IN | DIASTOLIC BLOOD PRESSURE: 80 MMHG | SYSTOLIC BLOOD PRESSURE: 119 MMHG | WEIGHT: 200 LBS | BODY MASS INDEX: 34.15 KG/M2 | OXYGEN SATURATION: 97 % | HEART RATE: 94 BPM

## 2023-05-16 DIAGNOSIS — Z98.1 S/P LUMBAR FUSION: Primary | ICD-10-CM

## 2023-05-16 DIAGNOSIS — Z98.1 S/P LUMBAR FUSION: ICD-10-CM

## 2023-05-16 PROCEDURE — 72100 X-RAY EXAM L-S SPINE 2/3 VWS: CPT

## 2023-05-16 PROCEDURE — 99212 OFFICE O/P EST SF 10 MIN: CPT

## 2023-05-16 PROCEDURE — G8417 CALC BMI ABV UP PARAM F/U: HCPCS | Performed by: STUDENT IN AN ORGANIZED HEALTH CARE EDUCATION/TRAINING PROGRAM

## 2023-05-16 PROCEDURE — 3079F DIAST BP 80-89 MM HG: CPT | Performed by: STUDENT IN AN ORGANIZED HEALTH CARE EDUCATION/TRAINING PROGRAM

## 2023-05-16 PROCEDURE — 99213 OFFICE O/P EST LOW 20 MIN: CPT | Performed by: STUDENT IN AN ORGANIZED HEALTH CARE EDUCATION/TRAINING PROGRAM

## 2023-05-16 PROCEDURE — 1036F TOBACCO NON-USER: CPT | Performed by: STUDENT IN AN ORGANIZED HEALTH CARE EDUCATION/TRAINING PROGRAM

## 2023-05-16 PROCEDURE — 3017F COLORECTAL CA SCREEN DOC REV: CPT | Performed by: STUDENT IN AN ORGANIZED HEALTH CARE EDUCATION/TRAINING PROGRAM

## 2023-05-16 PROCEDURE — G8427 DOCREV CUR MEDS BY ELIG CLIN: HCPCS | Performed by: STUDENT IN AN ORGANIZED HEALTH CARE EDUCATION/TRAINING PROGRAM

## 2023-05-16 PROCEDURE — 3074F SYST BP LT 130 MM HG: CPT | Performed by: STUDENT IN AN ORGANIZED HEALTH CARE EDUCATION/TRAINING PROGRAM

## 2023-05-16 RX ORDER — GABAPENTIN 300 MG/1
CAPSULE ORAL
COMMUNITY
Start: 2023-02-13

## 2023-05-16 NOTE — PROGRESS NOTES
Post-Operative Follow-up     This is a 63year old female who presents to the office for a 1 year follow-up s/p L3-L4 PLIF. Subjective: Patient states she is doing well. She states she gets some back pain here and there but overall better than prior to surgery. She denies any new numbness or weakness. No pain down the legs. No other complaints. XR reviewed with patient. Physical Exam:              WDWN, no apparent distress              Non-labored breathing               Vitals Stable              Alert and oriented x3              CN 3-12 intact              PERRL              EOMI              CAAL well              Motor strength symmetric              Sensation to LT intact bilaterally                Imagin2023 XR Lumbar Spine  Hardware intact, stable, no acute processes noted- final read pending. Assessment: This is a 63 y.o.  female presenting for a 1 year follow-up s/p L3-L4 PLIF. Stable. Plan:  -Pain control and expectations discussed  -OARRS report reviewed   -Follow-up in neurosurgery clinic prn  -Call or return to neurosurgery office sooner if symptoms worsen or if new issues arise in the interim.     Electronically signed by Ady Dyer PA-C on 2023 at 1:40 PM

## 2024-01-01 NOTE — PROGRESS NOTES
1430-SRDA care completed. Patient and family verbaize understanding of given discharge instructions. 1440 Patient discharged per wheelchair to car driven by  in stable condition. 1440- 0

## 2024-04-10 ENCOUNTER — OFFICE VISIT (OUTPATIENT)
Dept: NEUROSURGERY | Age: 64
End: 2024-04-10
Payer: COMMERCIAL

## 2024-04-10 VITALS — BODY MASS INDEX: 34.15 KG/M2 | WEIGHT: 200 LBS | HEIGHT: 64 IN | RESPIRATION RATE: 16 BRPM

## 2024-04-10 DIAGNOSIS — G89.29 CHRONIC BILATERAL LOW BACK PAIN WITHOUT SCIATICA: Primary | ICD-10-CM

## 2024-04-10 DIAGNOSIS — M54.50 CHRONIC BILATERAL LOW BACK PAIN WITHOUT SCIATICA: Primary | ICD-10-CM

## 2024-04-10 DIAGNOSIS — M54.16 LUMBAR RADICULOPATHY: ICD-10-CM

## 2024-04-10 DIAGNOSIS — Z98.1 S/P LUMBAR FUSION: ICD-10-CM

## 2024-04-10 PROCEDURE — 99212 OFFICE O/P EST SF 10 MIN: CPT

## 2024-04-10 PROCEDURE — G8427 DOCREV CUR MEDS BY ELIG CLIN: HCPCS | Performed by: STUDENT IN AN ORGANIZED HEALTH CARE EDUCATION/TRAINING PROGRAM

## 2024-04-10 PROCEDURE — 99213 OFFICE O/P EST LOW 20 MIN: CPT | Performed by: STUDENT IN AN ORGANIZED HEALTH CARE EDUCATION/TRAINING PROGRAM

## 2024-04-10 PROCEDURE — G8417 CALC BMI ABV UP PARAM F/U: HCPCS | Performed by: STUDENT IN AN ORGANIZED HEALTH CARE EDUCATION/TRAINING PROGRAM

## 2024-04-10 PROCEDURE — 3017F COLORECTAL CA SCREEN DOC REV: CPT | Performed by: STUDENT IN AN ORGANIZED HEALTH CARE EDUCATION/TRAINING PROGRAM

## 2024-04-10 PROCEDURE — 1036F TOBACCO NON-USER: CPT | Performed by: STUDENT IN AN ORGANIZED HEALTH CARE EDUCATION/TRAINING PROGRAM

## 2024-04-10 RX ORDER — METHYLPREDNISOLONE 4 MG/1
TABLET ORAL
Qty: 1 KIT | Refills: 0 | Status: SHIPPED | OUTPATIENT
Start: 2024-04-10

## 2024-04-10 NOTE — PROGRESS NOTES
Problem Focused Office Visit     Subjective: Ruth Johnson is a 63 y.o.  female who has a PMH DM, HTN and had previous underwent a L3-L4 PLIF by Dr. Camp on 5/19/2022. Patient had a stable post-operative period.     Patient presents today for worsening low back pain x6 months. She describes this pain as a throbbing, aching pain that radiates across her back. She denies any pain down her legs. No numbness or weakness with this pain. She states the pain is worse with movement, especially twisting. She has tried tylenol and heat with no relief. She has tried PT in the past, and continues to do home exercises with no relief. She has not tried any recent LIN. She is a nonsmoker and denies any blood thinner usage.     Physical Exam  HENT:      Head: Normocephalic.   Eyes:      Pupils: Pupils are equal, round, and reactive to light.   Cardiovascular:      Rate and Rhythm: Normal rate.   Pulmonary:      Effort: Pulmonary effort is normal.   Abdominal:      General: There is no distension.   Musculoskeletal:         General: Normal range of motion.      Cervical back: Normal range of motion.   Skin:     General: Skin is warm and dry.   Neurological:      Mental Status: She is alert.      Comments: A&Ox3  CN3-12 intact  Motor Strength full   Sensation intact to light touch   Reflexes normal    Psychiatric:         Thought Content: Thought content normal.               Assessment: This is a 63 y.o.  female presenting for evaluation of worsening low back pain. No recent PT or LIN. Hx L3-L4 PLIF    Plan:  -Pain control and expectations discussed  -Obtain CT Myelogram Lumbar spine to evaluate for stenosis and need for interventions such as injections or surgical, unable to obtain MRI d/t previously placed hardware   -Obtain Lumbar Flex/Ex XR  -OARRS report reviewed   -Return to Neurosurgery clinic after completion of imaging to discuss results and further treatment plan  -Call/return sooner if symptoms worsen or new issues

## 2024-04-19 ENCOUNTER — HOSPITAL ENCOUNTER (OUTPATIENT)
Age: 64
Discharge: HOME | End: 2024-04-19
Payer: COMMERCIAL

## 2024-04-19 ENCOUNTER — HOSPITAL ENCOUNTER (OUTPATIENT)
Dept: GENERAL RADIOLOGY | Age: 64
End: 2024-04-19
Payer: COMMERCIAL

## 2024-04-19 DIAGNOSIS — M54.16 LUMBAR RADICULOPATHY: ICD-10-CM

## 2024-04-19 DIAGNOSIS — G89.29 CHRONIC BILATERAL LOW BACK PAIN WITHOUT SCIATICA: ICD-10-CM

## 2024-04-19 DIAGNOSIS — Z98.1 S/P LUMBAR FUSION: ICD-10-CM

## 2024-04-19 DIAGNOSIS — M54.50 CHRONIC BILATERAL LOW BACK PAIN WITHOUT SCIATICA: ICD-10-CM

## 2024-04-19 PROCEDURE — 72120 X-RAY BEND ONLY L-S SPINE: CPT

## 2024-04-22 ENCOUNTER — TELEPHONE (OUTPATIENT)
Dept: NEUROSURGERY | Age: 64
End: 2024-04-22

## 2024-04-22 NOTE — TELEPHONE ENCOUNTER
Unfortunately it is too soon to have another medrol dosepack. It does look like we ordered updated imaging. Will wait for the results to discuss further treatment plan.

## 2024-04-22 NOTE — TELEPHONE ENCOUNTER
Patient is calling to say when you prescribed prednisone she felt great so she ran the sweeper and now her back is hurting her and wants to know if you will prescribe another pack

## 2024-05-22 ENCOUNTER — TELEPHONE (OUTPATIENT)
Dept: NEUROSURGERY | Age: 64
End: 2024-05-22

## 2024-05-22 PROBLEM — K21.9 GASTROESOPHAGEAL REFLUX DISEASE WITHOUT ESOPHAGITIS: Status: ACTIVE | Noted: 2022-05-12

## 2024-05-22 PROBLEM — K58.0 IRRITABLE BOWEL SYNDROME WITH DIARRHEA: Status: ACTIVE | Noted: 2023-07-11

## 2024-05-22 PROBLEM — E66.01 MORBID (SEVERE) OBESITY DUE TO EXCESS CALORIES (HCC): Status: ACTIVE | Noted: 2023-07-11

## 2024-05-22 PROBLEM — F33.0 MILD EPISODE OF RECURRENT MAJOR DEPRESSIVE DISORDER (HCC): Chronic | Status: ACTIVE | Noted: 2022-05-12

## 2024-05-22 PROBLEM — I15.2 HYPERTENSION ASSOCIATED WITH TYPE 2 DIABETES MELLITUS (HCC): Status: ACTIVE | Noted: 2019-10-03

## 2024-05-22 PROBLEM — E11.59 HYPERTENSION ASSOCIATED WITH TYPE 2 DIABETES MELLITUS (HCC): Status: ACTIVE | Noted: 2019-10-03

## 2024-05-22 PROBLEM — E11.69 MIXED DIABETIC HYPERLIPIDEMIA ASSOCIATED WITH TYPE 2 DIABETES MELLITUS (HCC): Chronic | Status: ACTIVE | Noted: 2019-10-03

## 2024-05-22 PROBLEM — E78.2 MIXED DIABETIC HYPERLIPIDEMIA ASSOCIATED WITH TYPE 2 DIABETES MELLITUS (HCC): Chronic | Status: ACTIVE | Noted: 2019-10-03

## 2024-05-22 PROBLEM — F12.90 MARIJUANA USER: Status: ACTIVE | Noted: 2023-07-11

## 2024-05-22 RX ORDER — PANCRELIPASE 36000; 180000; 114000 [USP'U]/1; [USP'U]/1; [USP'U]/1
2 CAPSULE, DELAYED RELEASE PELLETS ORAL
COMMUNITY
Start: 2024-02-26

## 2024-05-22 RX ORDER — IBUPROFEN 200 MG
CAPSULE ORAL
COMMUNITY
Start: 2024-04-20

## 2024-05-22 NOTE — TELEPHONE ENCOUNTER
Insurance: [employer]  Website: [email or web address]  Test: CT Lumbar Spine Post Myelogram  Status: Authorized  Case #: [id number]  Auth #: [id number]  Valid dates: N/A - No end date stated.

## 2024-06-04 ENCOUNTER — HOSPITAL ENCOUNTER (OUTPATIENT)
Age: 64
Discharge: HOME | End: 2024-06-04
Payer: COMMERCIAL

## 2024-06-04 DIAGNOSIS — M54.50 CHRONIC BILATERAL LOW BACK PAIN WITHOUT SCIATICA: ICD-10-CM

## 2024-06-04 DIAGNOSIS — M54.16 LUMBAR RADICULOPATHY: ICD-10-CM

## 2024-06-04 DIAGNOSIS — Z98.1 S/P LUMBAR FUSION: ICD-10-CM

## 2024-06-04 DIAGNOSIS — G89.29 CHRONIC BILATERAL LOW BACK PAIN WITHOUT SCIATICA: ICD-10-CM

## 2024-06-04 LAB
INR PPP: 1
PLATELET # BLD AUTO: 303 K/UL (ref 130–450)
PROTHROMBIN TIME: 11.4 SEC (ref 9.3–12.4)

## 2024-06-04 PROCEDURE — 85610 PROTHROMBIN TIME: CPT

## 2024-06-04 PROCEDURE — 85049 AUTOMATED PLATELET COUNT: CPT

## 2024-06-04 PROCEDURE — 36415 COLL VENOUS BLD VENIPUNCTURE: CPT

## 2024-06-28 ENCOUNTER — HOSPITAL ENCOUNTER (OUTPATIENT)
Dept: GENERAL RADIOLOGY | Age: 64
Discharge: HOME | End: 2024-06-28
Payer: COMMERCIAL

## 2024-06-28 ENCOUNTER — HOSPITAL ENCOUNTER (OUTPATIENT)
Dept: CT IMAGING | Age: 64
End: 2024-06-28
Payer: COMMERCIAL

## 2024-06-28 VITALS
HEIGHT: 64 IN | TEMPERATURE: 97.1 F | WEIGHT: 210 LBS | BODY MASS INDEX: 35.85 KG/M2 | RESPIRATION RATE: 19 BRPM | SYSTOLIC BLOOD PRESSURE: 163 MMHG | HEART RATE: 84 BPM | DIASTOLIC BLOOD PRESSURE: 90 MMHG | OXYGEN SATURATION: 98 %

## 2024-06-28 DIAGNOSIS — Z98.1 S/P LUMBAR FUSION: ICD-10-CM

## 2024-06-28 DIAGNOSIS — M54.16 LUMBAR RADICULOPATHY: ICD-10-CM

## 2024-06-28 DIAGNOSIS — M54.50 CHRONIC BILATERAL LOW BACK PAIN WITHOUT SCIATICA: ICD-10-CM

## 2024-06-28 DIAGNOSIS — G89.29 CHRONIC BILATERAL LOW BACK PAIN WITHOUT SCIATICA: ICD-10-CM

## 2024-06-28 PROCEDURE — 7100000010 HC PHASE II RECOVERY - FIRST 15 MIN

## 2024-06-28 PROCEDURE — 72132 CT LUMBAR SPINE W/DYE: CPT

## 2024-06-28 PROCEDURE — 72265 MYELOGRAPHY L-S SPINE: CPT

## 2024-06-28 PROCEDURE — 6360000004 HC RX CONTRAST MEDICATION: Performed by: RADIOLOGY

## 2024-06-28 PROCEDURE — 7100000011 HC PHASE II RECOVERY - ADDTL 15 MIN

## 2024-06-28 RX ORDER — IOPAMIDOL 408 MG/ML
10 INJECTION, SOLUTION INTRATHECAL
Status: COMPLETED | OUTPATIENT
Start: 2024-06-28 | End: 2024-06-28

## 2024-06-28 RX ADMIN — IOPAMIDOL 10 ML: 408 INJECTION, SOLUTION INTRATHECAL at 10:06

## 2024-06-28 NOTE — PROGRESS NOTES
Patient arrival to procedure area via independent ambulation with spouse for an anticipated and scheduled myelogram.    Allergies, current home medications, and history & physical reviewed. Confirmation that patient adhered to the required fasting instructions prior to the procedure. Vital signs indicate a safe level to proceed with procedure. See flowsheets for detailed documentation of vital signs.    Detailed procedural instructions were provided to the patient, ensuring they understood each step of the upcoming procedure. Ample opportunity given to ask questions, to which satisfactory answers were provided. The patient demonstrated a clear understanding of the information.    Throughout the interaction, the patient appeared calm and cooperative. The patient was reassured and made comfortable in preparation for the procedure.

## 2024-07-25 ENCOUNTER — OFFICE VISIT (OUTPATIENT)
Dept: NEUROSURGERY | Age: 64
End: 2024-07-25
Payer: COMMERCIAL

## 2024-07-25 VITALS
SYSTOLIC BLOOD PRESSURE: 130 MMHG | DIASTOLIC BLOOD PRESSURE: 89 MMHG | WEIGHT: 210 LBS | HEIGHT: 64 IN | TEMPERATURE: 97.4 F | BODY MASS INDEX: 35.85 KG/M2 | HEART RATE: 94 BPM | OXYGEN SATURATION: 98 %

## 2024-07-25 DIAGNOSIS — G89.29 CHRONIC BILATERAL LOW BACK PAIN WITHOUT SCIATICA: Primary | ICD-10-CM

## 2024-07-25 DIAGNOSIS — Z98.1 S/P LUMBAR FUSION: ICD-10-CM

## 2024-07-25 DIAGNOSIS — M54.50 CHRONIC BILATERAL LOW BACK PAIN WITHOUT SCIATICA: Primary | ICD-10-CM

## 2024-07-25 PROCEDURE — G8417 CALC BMI ABV UP PARAM F/U: HCPCS | Performed by: STUDENT IN AN ORGANIZED HEALTH CARE EDUCATION/TRAINING PROGRAM

## 2024-07-25 PROCEDURE — 99213 OFFICE O/P EST LOW 20 MIN: CPT

## 2024-07-25 PROCEDURE — G8427 DOCREV CUR MEDS BY ELIG CLIN: HCPCS | Performed by: STUDENT IN AN ORGANIZED HEALTH CARE EDUCATION/TRAINING PROGRAM

## 2024-07-25 PROCEDURE — 1036F TOBACCO NON-USER: CPT | Performed by: STUDENT IN AN ORGANIZED HEALTH CARE EDUCATION/TRAINING PROGRAM

## 2024-07-25 PROCEDURE — 3079F DIAST BP 80-89 MM HG: CPT | Performed by: STUDENT IN AN ORGANIZED HEALTH CARE EDUCATION/TRAINING PROGRAM

## 2024-07-25 PROCEDURE — 99213 OFFICE O/P EST LOW 20 MIN: CPT | Performed by: STUDENT IN AN ORGANIZED HEALTH CARE EDUCATION/TRAINING PROGRAM

## 2024-07-25 PROCEDURE — 3075F SYST BP GE 130 - 139MM HG: CPT | Performed by: STUDENT IN AN ORGANIZED HEALTH CARE EDUCATION/TRAINING PROGRAM

## 2024-07-25 PROCEDURE — 3017F COLORECTAL CA SCREEN DOC REV: CPT | Performed by: STUDENT IN AN ORGANIZED HEALTH CARE EDUCATION/TRAINING PROGRAM

## 2024-07-25 NOTE — PROGRESS NOTES
recently. CT Myelogram as above.     Plan:  -Pain control and expectations discussed  -CT Myelogram reviewed and discussed in detail, No neurosurgical interventions   -Due to the patient’s ongoing low back pain after lumbar fusion, this patient would benefit from the Zynex NexWave (IFC/NMES/TENS) settings to effectively and safely manage their pain, edema, muscle spasms, and atrophy in addition to a multimodal approach for their condition. The use of the Zynex NexWave device follows accepted standards of practice for treatment due to their conditions.   -Follow-up in neurosurgery clinic prn  -Call or return to neurosurgery office sooner if symptoms worsen or if new issues arise in the interim.    Electronically signed by Duyen Dumont PA-C on 7/25/2024 at 6:16 PM

## 2024-07-29 ENCOUNTER — TELEPHONE (OUTPATIENT)
Dept: NEUROSURGERY | Age: 64
End: 2024-07-29

## 2024-07-29 DIAGNOSIS — M54.50 CHRONIC BILATERAL LOW BACK PAIN WITHOUT SCIATICA: Primary | ICD-10-CM

## 2024-07-29 DIAGNOSIS — G89.29 CHRONIC BILATERAL LOW BACK PAIN WITHOUT SCIATICA: Primary | ICD-10-CM

## 2024-07-29 DIAGNOSIS — Z98.1 S/P LUMBAR FUSION: ICD-10-CM

## 2024-07-29 DIAGNOSIS — M54.16 LUMBAR RADICULOPATHY: ICD-10-CM

## 2024-07-29 RX ORDER — MELOXICAM 15 MG/1
15 TABLET ORAL DAILY PRN
Qty: 30 TABLET | Refills: 2 | Status: SHIPPED | OUTPATIENT
Start: 2024-07-29 | End: 2024-10-27

## (undated) DEVICE — GOWN,AURORA,BRTHSLV,2XL,18/CS: Brand: MEDLINE

## (undated) DEVICE — SET ORTHO STD STORTSTD2

## (undated) DEVICE — Z DISCONTINUED APPLICATOR SURG PREP 0.35OZ 2% CHG 70% ISO ALC W/ HI LT

## (undated) DEVICE — NEEDLE SPNL L3.5IN PNK HUB S STL REG WALL FIT STYL W/ QNCKE

## (undated) DEVICE — ADHESIVE SKIN CLOSURE TOP 36 CC HI VISC DERMBND MINI

## (undated) DEVICE — DRESSING ADH N ADH 8X35 IN 6X175 IN SFT CLTH MEDIPORE +

## (undated) DEVICE — 6 ML SYRINGE LUER-LOCK TIP: Brand: MONOJECT

## (undated) DEVICE — GLOVE SURG 8.5 PF POLYMER WHT STRL SIGN LTX ESSENTIAL LTX

## (undated) DEVICE — COVER,TABLE,60X90,STERILE: Brand: MEDLINE

## (undated) DEVICE — DRAPE C ARM UNIV W41XL74IN CLR PLAS XR VELC CLSR POLY STRP

## (undated) DEVICE — 3M™ IOBAN™ 2 ANTIMICROBIAL INCISE DRAPE 6650EZ: Brand: IOBAN™ 2

## (undated) DEVICE — SHEET, T, LAPAROTOMY, STERILE: Brand: MEDLINE

## (undated) DEVICE — GOWN,BREATHABLE,IMP SLV 3XL AURORA: Brand: MEDLINE

## (undated) DEVICE — BLADE CLIPPER GEN PURP NS

## (undated) DEVICE — 3 ML SYRINGE LUER-LOCK TIP: Brand: MONOJECT

## (undated) DEVICE — BIT DRL L110MM DIA1.8MM QUIK CPL CALIB W/O STP REUSE

## (undated) DEVICE — SURGICAL PROCEDURE PACK HND

## (undated) DEVICE — SURGICAL PROCEDURE PACK LAMINECTOMY LUMBAR

## (undated) DEVICE — GOWN,SIRUS,FABRNF,XL,20/CS: Brand: MEDLINE

## (undated) DEVICE — BIT DRL L100MM DIA2MM ST QUIK CPL NONRADIOPAQUE W/O STP

## (undated) DEVICE — DRILL SYSTEM 7

## (undated) DEVICE — SHEET,DRAPE,40X58,STERILE: Brand: MEDLINE

## (undated) DEVICE — ZIMMER® STERILE DISPOSABLE TOURNIQUET CUFF WITH PROTECTIVE SLEEVE AND PLC, DUAL PORT, SINGLE BLADDER, 18 IN. (46 CM)

## (undated) DEVICE — DOUBLE BASIN SET: Brand: MEDLINE INDUSTRIES, INC.

## (undated) DEVICE — NEEDLE HYPO 18GA L1.5IN PNK POLYPR HUB S STL THN WALL FILL

## (undated) DEVICE — GAUZE,SPONGE,4"X4",12PLY,STERILE,LF,2'S: Brand: MEDLINE

## (undated) DEVICE — APPLICATOR PREP 26ML 0.7% IOD POVACRYLEX 74% ISO ALC ST

## (undated) DEVICE — ADHESIVE SKIN CLSR 0.7ML TOP DERMBND ADV

## (undated) DEVICE — BANDAGE COMPR W4INXL10YD WHITE/BEIGE E MTRX HK LOOP CLSR

## (undated) DEVICE — HYPODERMIC SAFETY NEEDLE: Brand: MAGELLAN

## (undated) DEVICE — LUMBAR LAMINECTOMY: Brand: MEDLINE INDUSTRIES, INC.

## (undated) DEVICE — PADDING,UNDERCAST,COTTON, 4"X4YD STERILE: Brand: MEDLINE

## (undated) DEVICE — BANDAGE ADH W1XL3IN NAT FAB WVN FLX DURABLE N ADH PD SEAL

## (undated) DEVICE — Device

## (undated) DEVICE — NEEDLE SPNL 25GA L2IN BLU SHT NEO LUM PUNC DISP

## (undated) DEVICE — INTENDED FOR TISSUE SEPARATION, AND OTHER PROCEDURES THAT REQUIRE A SHARP SURGICAL BLADE TO PUNCTURE OR CUT.: Brand: BARD-PARKER ® STAINLESS STEEL BLADES

## (undated) DEVICE — GLOVE ORANGE PI 8   MSG9080

## (undated) DEVICE — NEEDLE HYPO 27GA L1.25IN GRY POLYPR HUB S STL REG BVL STR

## (undated) DEVICE — MARKER,SKIN,WI/RULER AND LABELS: Brand: MEDLINE

## (undated) DEVICE — READY WET SKIN SCRUB TRAY-LF: Brand: MEDLINE INDUSTRIES, INC.

## (undated) DEVICE — SCREW BNE L26MM DIA2.4MM CORT S STL ST T8 STARDRV RECESS
Type: IMPLANTABLE DEVICE | Site: WRIST | Status: NON-FUNCTIONAL
Removed: 2020-02-20

## (undated) DEVICE — 12 ML SYRINGE,LUER-LOCK TIP: Brand: MONOJECT

## (undated) DEVICE — SYRINGE 20ML LL S/C 50

## (undated) DEVICE — BLADE ES L6IN ELASTOMERIC COAT EXT DURABLE BEND UPTO 90DEG

## (undated) DEVICE — TRAY EPI SGL DOSE 18GA NDL CUST AULTMAN HOSP

## (undated) DEVICE — 1000 S-DRAPE TOWEL DRAPE 10/BX: Brand: STERI-DRAPE™

## (undated) DEVICE — ELECTROSURGICAL PENCIL BUTTON SWITCH E-Z CLEAN COATED BLADE ELECTRODE 10 FT (3 M) CORD HOLSTER: Brand: MEGADYNE

## (undated) DEVICE — GLOVE SURG SZ 85 STD WHT LTX SYN POLYMER BEAD REINF ANTI RL

## (undated) DEVICE — SCREW BNE L16MM DIA2.7MM CORT S STL ST T8 STARDRV RECESS
Type: IMPLANTABLE DEVICE | Site: WRIST | Status: NON-FUNCTIONAL
Removed: 2020-02-20

## (undated) DEVICE — LABEL MED 4 IN SURG PANEL W/ PEN STRL

## (undated) DEVICE — Z DISCONTINUED USE 2275686 GLOVE SURG SZ 8 L12IN FNGR THK13MIL WHT ISOLEX POLYISOPRENE

## (undated) DEVICE — SET SPINAL NEURO STNEU1

## (undated) DEVICE — GOWN,BREATHABLE SLV,AURORA,XLG,STRL: Brand: MEDLINE

## (undated) DEVICE — KIT EVAC 400CC DIA1/8IN H PAT 12.5IN 3 SPR RND SHP PVC DRN

## (undated) DEVICE — TOWEL,OR,DSP,ST,BLUE,STD,6/PK,12PK/CS: Brand: MEDLINE

## (undated) DEVICE — 3M™ RED DOT™ MONITORING ELECTRODE WITH FOAM TAPE AND STICKY GEL 2560, 50/BAG, 20/CASE, 72/PLT: Brand: RED DOT™

## (undated) DEVICE — TOTAL TRAY, DB, 100% SILI FOLEY, 16FR 10: Brand: MEDLINE

## (undated) DEVICE — PATIENT RETURN ELECTRODE, SINGLE-USE, CONTACT QUALITY MONITORING, ADULT, WITH 9FT CORD, FOR PATIENTS WEIGING OVER 33LBS. (15KG): Brand: MEGADYNE

## (undated) DEVICE — CLOTH SURG PREP PREOPERATIVE CHLORHEXIDINE GLUC 2% READYPREP

## (undated) DEVICE — SPHERE EYE 1 MRK GUIDANCE PASS STEALTHSTATION 1PK/EA

## (undated) DEVICE — GOWN,SIRUS,FABRNF,L,20/CS: Brand: MEDLINE

## (undated) DEVICE — 5.0MM PRECISION ROUND

## (undated) DEVICE — GLOVE ORANGE PI 7 1/2   MSG9075

## (undated) DEVICE — RESERVOIR CARDOTMY C4L HARDSHELL W/ 40UM FLTR EL SER 4 PRT

## (undated) DEVICE — NEEDLE HYPO 25GA L1.5IN BLU POLYPR HUB S STL REG BVL STR

## (undated) DEVICE — BAG TRNSF AUTOLGS SUCT AND ANTICOAG LN AUTOLOG

## (undated) DEVICE — DRAPE,REIN 53X77,STERILE: Brand: MEDLINE

## (undated) DEVICE — APPLICATOR MEDICATED 10.5 CC SOLUTION HI LT ORNG CHLORAPREP

## (undated) DEVICE — DRAPE C ARM W41XL74IN UNIV MOB W RUBBERBAND CLP

## (undated) DEVICE — KIT PLT RATIO DISPNS KT 2IN CANN TIP SPRY TIP DISP MAGELLAN

## (undated) DEVICE — TOWEL,OR,DSP,ST,BLUE,DLX,10/PK,8PK/CS: Brand: MEDLINE

## (undated) DEVICE — JACKSON TABLE POSITIONER KIT: Brand: MEDLINE INDUSTRIES, INC.

## (undated) DEVICE — 1.5L THIN WALL CAN: Brand: CRD

## (undated) DEVICE — DRIP REDUCTION MANIFOLD

## (undated) DEVICE — PACK AUTOLOG AUTOTRANSUFION

## (undated) DEVICE — EXTRAS UGOKWE

## (undated) DEVICE — BRUNNS CURRETTES

## (undated) DEVICE — ZELPHI RETRACTORS

## (undated) DEVICE — GLOVE SURG SZ 8 L12IN FNGR THK79MIL GRN LTX FREE

## (undated) DEVICE — 3M™ COBAN™ NL STERILE NON-LATEX SELF-ADHERENT WRAP, 2084S, 4 IN X 5 YD (10 CM X 4,5 M), 18 ROLLS/CASE: Brand: 3M™ COBAN™

## (undated) DEVICE — TUBING SUCT 12FR MAL ALUM SHFT FN CAP VENT UNIV CONN W/ OBT

## (undated) DEVICE — ELECTRODE PT RET AD L9FT HI MOIST COND ADH HYDRGEL CORDED

## (undated) DEVICE — LOCATOR RAD PASS SPHR MRK NAVIGATION BALL DISP STLTH STN

## (undated) DEVICE — GRADUATE

## (undated) DEVICE — 3M™ STERI-DRAPE™ U-DRAPE 1015: Brand: STERI-DRAPE™

## (undated) DEVICE — THE MILL DISPOSABLE - MEDIUM

## (undated) DEVICE — SKIN AFFIX SURG ADHESIVE 72/CS 0.55ML: Brand: MEDLINE

## (undated) DEVICE — CODMAN® SURGICAL PATTIES 1/2" X 1" (1.27CM X 2.54CM): Brand: CODMAN®

## (undated) DEVICE — THE MILL DISPOSABLE - FINE

## (undated) DEVICE — GAUZE,SPONGE,4"X4",16PLY,XRAY,STRL,LF: Brand: MEDLINE